# Patient Record
Sex: MALE | Race: WHITE | Employment: OTHER | URBAN - NONMETROPOLITAN AREA
[De-identification: names, ages, dates, MRNs, and addresses within clinical notes are randomized per-mention and may not be internally consistent; named-entity substitution may affect disease eponyms.]

---

## 2018-03-06 ENCOUNTER — HOSPITAL ENCOUNTER (OUTPATIENT)
Age: 68
Discharge: HOME | End: 2018-03-06
Payer: MEDICARE

## 2018-03-06 LAB
ALBUMIN SERPL-MCNC: 4.3 G/DL (ref 3.5–5.1)
ALP BLD-CCNC: 83 U/L (ref 38–126)
ALT SERPL-CCNC: 20 U/L (ref 11–66)
ANION GAP SERPL CALCULATED.3IONS-SCNC: 12 MEQ/L (ref 8–16)
AST SERPL-CCNC: 22 U/L (ref 5–40)
BASOPHILS # BLD: 0.4 %
BASOPHILS ABSOLUTE: 0 THOU/MM3 (ref 0–0.1)
BILIRUB SERPL-MCNC: 0.8 MG/DL (ref 0.3–1.2)
BILIRUBIN DIRECT: < 0.2 MG/DL (ref 0–0.3)
BUN BLDV-MCNC: 17 MG/DL (ref 7–22)
CALCIUM SERPL-MCNC: 9.4 MG/DL (ref 8.5–10.5)
CHLORIDE BLD-SCNC: 104 MEQ/L (ref 98–111)
CHOLESTEROL, FASTING: 163 MG/DL (ref 100–199)
CO2: 28 MEQ/L (ref 23–33)
CREAT SERPL-MCNC: 1 MG/DL (ref 0.4–1.2)
EOSINOPHIL # BLD: 3.2 %
EOSINOPHILS ABSOLUTE: 0.2 THOU/MM3 (ref 0–0.4)
GFR SERPL CREATININE-BSD FRML MDRD: 74 ML/MIN/1.73M2
GLUCOSE FASTING: 107 MG/DL (ref 70–108)
HCT VFR BLD CALC: 43.2 % (ref 42–52)
HDLC SERPL-MCNC: 52 MG/DL
HEMOGLOBIN: 14.2 GM/DL (ref 14–18)
LDL CHOLESTEROL CALCULATED: 97 MG/DL
LYMPHOCYTES # BLD: 18.5 %
LYMPHOCYTES ABSOLUTE: 1.3 THOU/MM3 (ref 1–4.8)
MCH RBC QN AUTO: 30.8 PG (ref 27–31)
MCHC RBC AUTO-ENTMCNC: 32.8 GM/DL (ref 33–37)
MCV RBC AUTO: 93.8 FL (ref 80–94)
MONOCYTES # BLD: 8.5 %
MONOCYTES ABSOLUTE: 0.6 THOU/MM3 (ref 0.4–1.3)
NUCLEATED RED BLOOD CELLS: 0 /100 WBC
PDW BLD-RTO: 13.6 % (ref 11.5–14.5)
PLATELET # BLD: 313 THOU/MM3 (ref 130–400)
PMV BLD AUTO: 7.9 FL (ref 7.4–10.4)
POTASSIUM SERPL-SCNC: 4.4 MEQ/L (ref 3.5–5.2)
PROSTATE SPECIFIC ANTIGEN: 0.41 NG/ML (ref 0–1)
RBC # BLD: 4.6 MILL/MM3 (ref 4.7–6.1)
SEG NEUTROPHILS: 69.4 %
SEGMENTED NEUTROPHILS ABSOLUTE COUNT: 4.7 THOU/MM3 (ref 1.8–7.7)
SODIUM BLD-SCNC: 144 MEQ/L (ref 135–145)
TOTAL PROTEIN: 6.9 G/DL (ref 6.1–8)
TRIGLYCERIDE, FASTING: 68 MG/DL (ref 0–199)
WBC # BLD: 6.8 THOU/MM3 (ref 4.8–10.8)

## 2018-03-06 PROCEDURE — 80076 HEPATIC FUNCTION PANEL: CPT

## 2018-03-06 PROCEDURE — G0103 PSA SCREENING: HCPCS

## 2018-03-06 PROCEDURE — 80048 BASIC METABOLIC PNL TOTAL CA: CPT

## 2018-03-06 PROCEDURE — 85025 COMPLETE CBC W/AUTO DIFF WBC: CPT

## 2018-03-06 PROCEDURE — 36415 COLL VENOUS BLD VENIPUNCTURE: CPT

## 2018-03-06 PROCEDURE — 80061 LIPID PANEL: CPT

## 2018-06-22 ENCOUNTER — HOSPITAL ENCOUNTER (OUTPATIENT)
Age: 68
Discharge: HOME | End: 2018-06-22
Payer: MEDICARE

## 2018-06-22 LAB
ALBUMIN SERPL-MCNC: 4.2 G/DL (ref 3.5–5.1)
ALP BLD-CCNC: 76 U/L (ref 38–126)
ALT SERPL-CCNC: 20 U/L (ref 11–66)
ANION GAP SERPL CALCULATED.3IONS-SCNC: 11 MEQ/L (ref 8–16)
AST SERPL-CCNC: 23 U/L (ref 5–40)
BILIRUB SERPL-MCNC: 0.8 MG/DL (ref 0.3–1.2)
BILIRUBIN DIRECT: < 0.2 MG/DL (ref 0–0.3)
BUN BLDV-MCNC: 18 MG/DL (ref 7–22)
CALCIUM SERPL-MCNC: 9.8 MG/DL (ref 8.5–10.5)
CHLORIDE BLD-SCNC: 105 MEQ/L (ref 98–111)
CO2: 28 MEQ/L (ref 23–33)
CREAT SERPL-MCNC: 1 MG/DL (ref 0.4–1.2)
GFR SERPL CREATININE-BSD FRML MDRD: 74 ML/MIN/1.73M2
GLUCOSE BLD-MCNC: 103 MG/DL (ref 70–108)
HCT VFR BLD CALC: 45.3 % (ref 42–52)
HEMOGLOBIN: 15.2 GM/DL (ref 14–18)
MCH RBC QN AUTO: 31.5 PG (ref 27–31)
MCHC RBC AUTO-ENTMCNC: 33.6 GM/DL (ref 33–37)
MCV RBC AUTO: 93.7 FL (ref 80–94)
PDW BLD-RTO: 13.8 % (ref 11.5–14.5)
PLATELET # BLD: 294 THOU/MM3 (ref 130–400)
PMV BLD AUTO: 7.9 FL (ref 7.4–10.4)
POTASSIUM SERPL-SCNC: 5 MEQ/L (ref 3.5–5.2)
RBC # BLD: 4.83 MILL/MM3 (ref 4.7–6.1)
SODIUM BLD-SCNC: 144 MEQ/L (ref 135–145)
TOTAL PROTEIN: 7.1 G/DL (ref 6.1–8)
WBC # BLD: 5.8 THOU/MM3 (ref 4.8–10.8)

## 2018-06-22 PROCEDURE — 80053 COMPREHEN METABOLIC PANEL: CPT

## 2018-06-22 PROCEDURE — 85027 COMPLETE CBC AUTOMATED: CPT

## 2018-06-22 PROCEDURE — 36415 COLL VENOUS BLD VENIPUNCTURE: CPT

## 2018-06-22 PROCEDURE — 82248 BILIRUBIN DIRECT: CPT

## 2018-08-10 ENCOUNTER — HOSPITAL ENCOUNTER (EMERGENCY)
Age: 68
Discharge: HOME | End: 2018-08-10
Attending: FAMILY MEDICINE
Payer: MEDICARE

## 2018-08-10 ENCOUNTER — APPOINTMENT (OUTPATIENT)
Dept: GENERAL RADIOLOGY | Age: 68
End: 2018-08-10
Payer: MEDICARE

## 2018-08-10 VITALS
HEART RATE: 62 BPM | BODY MASS INDEX: 25.77 KG/M2 | WEIGHT: 180 LBS | OXYGEN SATURATION: 97 % | RESPIRATION RATE: 16 BRPM | HEIGHT: 70 IN | SYSTOLIC BLOOD PRESSURE: 148 MMHG | TEMPERATURE: 97.1 F | DIASTOLIC BLOOD PRESSURE: 87 MMHG

## 2018-08-10 DIAGNOSIS — S39.012A BACK STRAIN, INITIAL ENCOUNTER: Primary | ICD-10-CM

## 2018-08-10 PROCEDURE — 6360000002 HC RX W HCPCS: Performed by: FAMILY MEDICINE

## 2018-08-10 PROCEDURE — 96372 THER/PROPH/DIAG INJ SC/IM: CPT

## 2018-08-10 PROCEDURE — 2709999900 HC NON-CHARGEABLE SUPPLY

## 2018-08-10 PROCEDURE — 72100 X-RAY EXAM L-S SPINE 2/3 VWS: CPT

## 2018-08-10 PROCEDURE — 99283 EMERGENCY DEPT VISIT LOW MDM: CPT

## 2018-08-10 RX ORDER — ACETAMINOPHEN 500 MG
1000 TABLET ORAL EVERY 6 HOURS PRN
COMMUNITY

## 2018-08-10 RX ORDER — CYCLOBENZAPRINE HCL 10 MG
10 TABLET ORAL 3 TIMES DAILY PRN
Qty: 21 TABLET | Refills: 0 | Status: SHIPPED | OUTPATIENT
Start: 2018-08-10 | End: 2018-08-20

## 2018-08-10 RX ORDER — TRAMADOL HYDROCHLORIDE 50 MG/1
50 TABLET ORAL EVERY 4 HOURS PRN
Qty: 18 TABLET | Refills: 0 | Status: SHIPPED | OUTPATIENT
Start: 2018-08-10 | End: 2018-08-13

## 2018-08-10 RX ORDER — CETIRIZINE HYDROCHLORIDE 10 MG/1
10 TABLET ORAL DAILY
COMMUNITY

## 2018-08-10 RX ORDER — ORPHENADRINE CITRATE 30 MG/ML
60 INJECTION INTRAMUSCULAR; INTRAVENOUS ONCE
Status: COMPLETED | OUTPATIENT
Start: 2018-08-10 | End: 2018-08-10

## 2018-08-10 RX ORDER — KETOROLAC TROMETHAMINE 30 MG/ML
30 INJECTION, SOLUTION INTRAMUSCULAR; INTRAVENOUS ONCE
Status: COMPLETED | OUTPATIENT
Start: 2018-08-10 | End: 2018-08-10

## 2018-08-10 RX ORDER — PREDNISONE 10 MG/1
10 TABLET ORAL DAILY
Status: ON HOLD | COMMUNITY
End: 2020-09-26 | Stop reason: HOSPADM

## 2018-08-10 RX ADMIN — KETOROLAC TROMETHAMINE 30 MG: 30 INJECTION, SOLUTION INTRAMUSCULAR at 17:52

## 2018-08-10 RX ADMIN — ORPHENADRINE CITRATE 60 MG: 30 INJECTION INTRAMUSCULAR; INTRAVENOUS at 17:51

## 2018-08-10 ASSESSMENT — PAIN DESCRIPTION - DESCRIPTORS
DESCRIPTORS: DISCOMFORT;DULL
DESCRIPTORS: ACHING
DESCRIPTORS: SHARP

## 2018-08-10 ASSESSMENT — PAIN SCALES - GENERAL
PAINLEVEL_OUTOF10: 5
PAINLEVEL_OUTOF10: 2

## 2018-08-10 ASSESSMENT — PAIN DESCRIPTION - LOCATION
LOCATION: BACK
LOCATION: BACK

## 2018-08-10 ASSESSMENT — PAIN DESCRIPTION - ORIENTATION: ORIENTATION: LOWER

## 2018-08-10 NOTE — ED NOTES
Pt pink, warm and dry, breathing with ease. Pt states he feels much better. Prescriptions explained, pt states understanding. AVS reviewed including follow up appointments, diagnosis, and care of self at home. Denies questions or concerns. Pt remains alert and oriented. Pt discharged in stable condition.       Guerda Mason RN  08/10/18 9960

## 2018-08-11 ASSESSMENT — ENCOUNTER SYMPTOMS
ABDOMINAL DISTENTION: 0
DIARRHEA: 0
SORE THROAT: 0
RHINORRHEA: 0
SHORTNESS OF BREATH: 0
SINUS PRESSURE: 0
EYE DISCHARGE: 0
STRIDOR: 0
NAUSEA: 0
ABDOMINAL PAIN: 0
EYE PAIN: 0
PHOTOPHOBIA: 0
WHEEZING: 0
BACK PAIN: 1
COUGH: 0
EYE REDNESS: 0
CONSTIPATION: 0
VOMITING: 0
CHEST TIGHTNESS: 0

## 2018-08-11 NOTE — ED PROVIDER NOTES
[facility]       Chief Complaint   Patient presents with    Back Pain     lower back       Nurses Notes reviewed and I agree except as noted in the HPI. HISTORY OF PRESENT ILLNESS    Cleora Sandhoff is a 70 y.o. male who presents With low back pain. Pain started earlier today. Patient states that he was bending over when he returned to a standing position he felt a pop in his lower back. He denies any referred pain to his lower legs. Denies any bowel or bladder changes. Denies any numbness around his anal area. Pain is some moderate intensity. REVIEW OF SYSTEMS     Review of Systems   Constitutional: Negative for chills, diaphoresis and fever (Non toxic in appearence). HENT: Negative for congestion, dental problem, ear pain, hearing loss, rhinorrhea, sinus pressure, sore throat and tinnitus. Eyes: Negative for photophobia, pain, discharge, redness and visual disturbance. Respiratory: Negative for cough, chest tightness, shortness of breath, wheezing and stridor. Cardiovascular: Negative for chest pain, palpitations and leg swelling. Gastrointestinal: Negative for abdominal distention, abdominal pain, constipation, diarrhea, nausea and vomiting. Genitourinary: Negative for difficulty urinating, discharge, dysuria, flank pain, frequency, hematuria, testicular pain and urgency. Musculoskeletal: Positive for back pain. Negative for arthralgias, gait problem, joint swelling, myalgias, neck pain and neck stiffness. Skin: Negative for pallor, rash and wound. Neurological: Negative for dizziness, tremors, seizures, syncope, numbness and headaches. Hematological: Negative for adenopathy. Does not bruise/bleed easily. Psychiatric/Behavioral: Negative for agitation, confusion, hallucinations, self-injury and suicidal ideas. The patient is not nervous/anxious. All other systems reviewed and are negative.        PAST MEDICAL HISTORY    has a

## 2019-03-30 ENCOUNTER — HOSPITAL ENCOUNTER (OUTPATIENT)
Age: 69
Discharge: HOME | End: 2019-03-30
Payer: MEDICARE

## 2019-03-30 LAB
ALBUMIN SERPL-MCNC: 4 G/DL (ref 3.5–5.1)
ALP BLD-CCNC: 70 U/L (ref 38–126)
ALT SERPL-CCNC: 21 U/L (ref 11–66)
ANION GAP SERPL CALCULATED.3IONS-SCNC: 14 MEQ/L (ref 8–16)
AST SERPL-CCNC: 26 U/L (ref 5–40)
BASOPHILS # BLD: 1.1 %
BASOPHILS ABSOLUTE: 0.1 THOU/MM3 (ref 0–0.1)
BILIRUB SERPL-MCNC: 0.3 MG/DL (ref 0.3–1.2)
BILIRUBIN DIRECT: < 0.2 MG/DL (ref 0–0.3)
BUN BLDV-MCNC: 16 MG/DL (ref 7–22)
CALCIUM SERPL-MCNC: 8.7 MG/DL (ref 8.5–10.5)
CHLORIDE BLD-SCNC: 103 MEQ/L (ref 98–111)
CHOLESTEROL, TOTAL: 149 MG/DL (ref 100–199)
CO2: 25 MEQ/L (ref 23–33)
CREAT SERPL-MCNC: 0.9 MG/DL (ref 0.4–1.2)
EOSINOPHIL # BLD: 4.3 %
EOSINOPHILS ABSOLUTE: 0.2 THOU/MM3 (ref 0–0.4)
ERYTHROCYTE [DISTWIDTH] IN BLOOD BY AUTOMATED COUNT: 12.9 % (ref 11.5–14.5)
ERYTHROCYTE [DISTWIDTH] IN BLOOD BY AUTOMATED COUNT: 45.7 FL (ref 35–45)
GFR SERPL CREATININE-BSD FRML MDRD: 83 ML/MIN/1.73M2
GLUCOSE BLD-MCNC: 97 MG/DL (ref 70–108)
HCT VFR BLD CALC: 42.7 % (ref 42–52)
HDLC SERPL-MCNC: 51 MG/DL
HEMOGLOBIN: 13.7 GM/DL (ref 14–18)
IMMATURE GRANS (ABS): 0.02 THOU/MM3 (ref 0–0.07)
IMMATURE GRANULOCYTES: 0.4 %
LDL CHOLESTEROL CALCULATED: 89 MG/DL
LYMPHOCYTES # BLD: 23.1 %
LYMPHOCYTES ABSOLUTE: 1.2 THOU/MM3 (ref 1–4.8)
MCH RBC QN AUTO: 31 PG (ref 26–33)
MCHC RBC AUTO-ENTMCNC: 32.1 GM/DL (ref 32.2–35.5)
MCV RBC AUTO: 96.6 FL (ref 80–94)
MONOCYTES # BLD: 10.4 %
MONOCYTES ABSOLUTE: 0.6 THOU/MM3 (ref 0.4–1.3)
NUCLEATED RED BLOOD CELLS: 0 /100 WBC
PLATELET # BLD: 287 THOU/MM3 (ref 130–400)
PMV BLD AUTO: 9.7 FL (ref 9.4–12.4)
POTASSIUM SERPL-SCNC: 3.9 MEQ/L (ref 3.5–5.2)
RBC # BLD: 4.42 MILL/MM3 (ref 4.7–6.1)
SEG NEUTROPHILS: 60.7 %
SEGMENTED NEUTROPHILS ABSOLUTE COUNT: 3.3 THOU/MM3 (ref 1.8–7.7)
SODIUM BLD-SCNC: 142 MEQ/L (ref 135–145)
TOTAL PROTEIN: 6.6 G/DL (ref 6.1–8)
TRIGL SERPL-MCNC: 44 MG/DL (ref 0–199)
WBC # BLD: 5.4 THOU/MM3 (ref 4.8–10.8)

## 2019-03-30 PROCEDURE — 80061 LIPID PANEL: CPT

## 2019-03-30 PROCEDURE — 36415 COLL VENOUS BLD VENIPUNCTURE: CPT

## 2019-03-30 PROCEDURE — 80053 COMPREHEN METABOLIC PANEL: CPT

## 2019-03-30 PROCEDURE — 85025 COMPLETE CBC W/AUTO DIFF WBC: CPT

## 2019-03-30 PROCEDURE — 82248 BILIRUBIN DIRECT: CPT

## 2019-04-06 ENCOUNTER — HOSPITAL ENCOUNTER (EMERGENCY)
Age: 69
Discharge: HOME | End: 2019-04-06
Attending: EMERGENCY MEDICINE
Payer: MEDICARE

## 2019-04-06 ENCOUNTER — APPOINTMENT (OUTPATIENT)
Dept: GENERAL RADIOLOGY | Age: 69
End: 2019-04-06
Payer: MEDICARE

## 2019-04-06 VITALS
DIASTOLIC BLOOD PRESSURE: 90 MMHG | HEIGHT: 71 IN | WEIGHT: 180 LBS | BODY MASS INDEX: 25.2 KG/M2 | HEART RATE: 83 BPM | OXYGEN SATURATION: 97 % | SYSTOLIC BLOOD PRESSURE: 136 MMHG | RESPIRATION RATE: 15 BRPM | TEMPERATURE: 97.7 F

## 2019-04-06 DIAGNOSIS — R09.82 POSTNASAL DRIP: ICD-10-CM

## 2019-04-06 DIAGNOSIS — J06.9 VIRAL URI: ICD-10-CM

## 2019-04-06 DIAGNOSIS — R05.9 COUGH: Primary | ICD-10-CM

## 2019-04-06 PROCEDURE — 71046 X-RAY EXAM CHEST 2 VIEWS: CPT

## 2019-04-06 PROCEDURE — 99284 EMERGENCY DEPT VISIT MOD MDM: CPT

## 2019-04-06 PROCEDURE — 6370000000 HC RX 637 (ALT 250 FOR IP): Performed by: EMERGENCY MEDICINE

## 2019-04-06 RX ORDER — FLUTICASONE PROPIONATE 50 MCG
1 SPRAY, SUSPENSION (ML) NASAL DAILY
Qty: 1 BOTTLE | Refills: 0 | Status: SHIPPED | OUTPATIENT
Start: 2019-04-06 | End: 2021-09-13

## 2019-04-06 RX ORDER — ONDANSETRON 4 MG/1
4 TABLET, ORALLY DISINTEGRATING ORAL ONCE
Status: COMPLETED | OUTPATIENT
Start: 2019-04-06 | End: 2019-04-06

## 2019-04-06 RX ORDER — CEPHALEXIN 500 MG/1
500 CAPSULE ORAL 3 TIMES DAILY
COMMUNITY
End: 2020-09-19

## 2019-04-06 RX ADMIN — ONDANSETRON 4 MG: 4 TABLET, ORALLY DISINTEGRATING ORAL at 07:56

## 2019-04-06 ASSESSMENT — ENCOUNTER SYMPTOMS
BLOOD IN STOOL: 0
EYE DISCHARGE: 0
SORE THROAT: 0
RHINORRHEA: 1
WHEEZING: 0
COUGH: 1
ABDOMINAL PAIN: 0
EYE PAIN: 0
DIARRHEA: 0
SHORTNESS OF BREATH: 0

## 2019-04-06 ASSESSMENT — PAIN DESCRIPTION - LOCATION
LOCATION: HEAD
LOCATION: HEAD

## 2019-04-06 ASSESSMENT — PAIN - FUNCTIONAL ASSESSMENT: PAIN_FUNCTIONAL_ASSESSMENT: 0-10

## 2019-04-06 ASSESSMENT — PAIN SCALES - GENERAL
PAINLEVEL_OUTOF10: 4
PAINLEVEL_OUTOF10: 4

## 2019-04-06 NOTE — ED NOTES
Pt alert and oriented. Respirations regular and easy. Prescriptions given and instructed on. Discharge instructions reviewed. States understanding. Pt discharged in satisfactory condition.        Alvaro Decker RN  04/06/19 2709

## 2019-04-06 NOTE — ED NOTES
Pt presents w/ c/o headache, cough, fatigue, weakness, and nausea w/ 1 episode of emesis. States that symptoms started on Tuesday, he was seen by Dr. Dolores Gr. States that he was given a shot in the office, which he believes was an antibiotic, and then was started on cephalexin. States that symptoms are getting worse and he is just wiped out. Denies any fever/ chills. States that cough has been productive, dry cough noted at present time during triage. Respirations regular and easy. Lungs sound clear t/o. C/o back and rib ain w/ cough.       Asmita Macias RN  04/06/19 0658

## 2019-04-06 NOTE — ED PROVIDER NOTES
[facility]  [address]  Phone: [phone]    eMERGENCY dEPARTMENT eNCOUnter           [address] [facility]       Chief Complaint   Patient presents with    Cough    Emesis    Fatigue    Headache       Nurses Notes reviewed and I agree except as noted in the HPI. HISTORY OF PRESENT ILLNESS    Doris Hernandez is a 70 y.o. male who presented via private vehicle with the chief complaint mentioned above. Symptoms started 4 days ago. He has mild intermittent nonproductive cough which is worse during the night. He vomited once last night after a a bout of coughing. He denies fever or chills. He is slightly tired, has mild diffuse headache. He was started on Keflex 4 days ago by his PCP. REVIEW OF SYSTEMS     Review of Systems   Constitutional: Positive for fatigue. Negative for chills and fever. HENT: Positive for congestion and rhinorrhea. Negative for sore throat. Eyes: Negative for pain and discharge. Respiratory: Positive for cough. Negative for shortness of breath and wheezing. Cardiovascular: Negative for chest pain and palpitations. Gastrointestinal: Negative for abdominal pain, blood in stool and diarrhea. Genitourinary: Negative for dysuria and hematuria. Musculoskeletal: Negative for neck pain and neck stiffness. Neurological: Negative for seizures, syncope and headaches. Psychiatric/Behavioral: Negative for confusion. PAST MEDICAL HISTORY    has a past medical history of GERD (gastroesophageal reflux disease) and Hyperlipidemia. SURGICAL HISTORY      has a past surgical history that includes Neck surgery and Knee arthroscopy.     CURRENT MEDICATIONS       Previous Medications    ACETAMINOPHEN (TYLENOL) 500 MG TABLET    Take 1,000 mg by mouth every 6 hours as needed for Pain    CEPHALEXIN (KEFLEX) 500 MG CAPSULE    Take 500 mg by mouth 3 times daily    CETIRIZINE (ZYRTEC) 10 MG TABLET    Take 10 mg by mouth daily MYCOPHENOLATE MOFETIL PO    Take 500 mg by mouth daily    OMEPRAZOLE (PRILOSEC) 20 MG CAPSULE    Take 20 mg by mouth daily    PREDNISONE (DELTASONE) 10 MG TABLET    Take 10 mg by mouth daily    SIMVASTATIN (ZOCOR) 20 MG TABLET    Take 20 mg by mouth nightly       ALLERGIES     is allergic to sulfa antibiotics. FAMILY HISTORY     has no family status information on file. family history is not on file. SOCIAL HISTORY      reports that he has never smoked. He has never used smokeless tobacco. He reports that he drinks about 0.6 oz of alcohol per week. He reports that he does not use drugs. PHYSICAL EXAM     INITIAL VITALS:  height is 5' 11\" (1.803 m) and weight is 180 lb (81.6 kg). His temporal temperature is 97.7 °F (36.5 °C). His blood pressure is 136/90 (abnormal) and his pulse is 83. His respiration is 15 and oxygen saturation is 97%. Physical Exam   Constitutional: He appears well-developed and well-nourished. He is active. No distress. HENT:   Nose: Rhinorrhea present. Mouth/Throat: Oropharynx is clear and moist. No oropharyngeal exudate. Eyes: Conjunctivae are normal. No scleral icterus. Neck: Normal range of motion. Neck supple. No JVD present. No thyromegaly present. Cardiovascular: Normal rate, regular rhythm and normal heart sounds. No murmur heard. Pulmonary/Chest: Effort normal and breath sounds normal. No respiratory distress. Normal work  of  breathing   Abdominal: Soft. Bowel sounds are normal. He exhibits no distension and no mass. There is no tenderness. There is no rebound and no guarding. Musculoskeletal: He exhibits no edema or tenderness. Neurological: He is alert. Skin: Skin is warm and dry. No cyanosis. Nails show no clubbing. DIAGNOSTIC RESULTS       RADIOLOGY:  Chest x-ray, PA lateral, was unremarkable.       EMERGENCY DEPARTMENT COURSE:   Vitals:    Vitals:    04/06/19 0726   BP: (!) 136/90   Pulse: 83   Resp: 15   Temp: 97.7 °F (36.5 °C) TempSrc: Temporal   SpO2: 97%   Weight: 180 lb (81.6 kg)   Height: 5' 11\" (1.803 m)         FINAL IMPRESSION      1. Cough    2. Viral URI    3. Postnasal drip          DISPOSITION/PLAN   Discharged home in good condition, he was given discharge instructions and was advised to return if worse or new symptoms. PATIENT REFERRED TO:  No follow-up provider specified.     DISCHARGE MEDICATIONS:  New Prescriptions    FLUTICASONE (FLONASE) 50 MCG/ACT NASAL SPRAY    1 spray by Each Nare route daily       (Please note that portions of this note were completed with a voice recognition program.  Efforts were made to edit the dictations but occasionally words are mis-transcribed.)    MD Gregory Stiles MD  04/06/19 0142

## 2019-10-29 ENCOUNTER — HOSPITAL ENCOUNTER (OUTPATIENT)
Dept: MRI IMAGING | Age: 69
Discharge: HOME | End: 2019-10-29
Payer: MEDICARE

## 2019-10-29 DIAGNOSIS — M25.512 LEFT SHOULDER PAIN, UNSPECIFIED CHRONICITY: ICD-10-CM

## 2019-10-29 PROCEDURE — 73221 MRI JOINT UPR EXTREM W/O DYE: CPT

## 2020-01-29 ENCOUNTER — HOSPITAL ENCOUNTER (OUTPATIENT)
Dept: GENERAL RADIOLOGY | Age: 70
Discharge: HOME | End: 2020-01-29
Payer: MEDICARE

## 2020-01-29 ENCOUNTER — HOSPITAL ENCOUNTER (OUTPATIENT)
Age: 70
Discharge: HOME | End: 2020-01-29
Payer: MEDICARE

## 2020-01-29 PROCEDURE — 71046 X-RAY EXAM CHEST 2 VIEWS: CPT

## 2020-04-01 ENCOUNTER — HOSPITAL ENCOUNTER (OUTPATIENT)
Age: 70
Discharge: HOME | End: 2020-04-01
Payer: MEDICARE

## 2020-04-01 LAB
ALBUMIN SERPL-MCNC: 4.3 G/DL (ref 3.5–5.1)
ALP BLD-CCNC: 68 U/L (ref 38–126)
ALT SERPL-CCNC: 24 U/L (ref 11–66)
ANION GAP SERPL CALCULATED.3IONS-SCNC: 14 MEQ/L (ref 8–16)
AST SERPL-CCNC: 22 U/L (ref 5–40)
BASOPHILS # BLD: 0.9 %
BASOPHILS ABSOLUTE: 0.1 THOU/MM3 (ref 0–0.1)
BILIRUB SERPL-MCNC: 0.6 MG/DL (ref 0.3–1.2)
BILIRUBIN DIRECT: < 0.2 MG/DL (ref 0–0.3)
BUN BLDV-MCNC: 16 MG/DL (ref 7–22)
CALCIUM SERPL-MCNC: 9.3 MG/DL (ref 8.5–10.5)
CHLORIDE BLD-SCNC: 105 MEQ/L (ref 98–111)
CHOLESTEROL, TOTAL: 172 MG/DL (ref 100–199)
CO2: 25 MEQ/L (ref 23–33)
CREAT SERPL-MCNC: 0.9 MG/DL (ref 0.4–1.2)
EOSINOPHIL # BLD: 6.1 %
EOSINOPHILS ABSOLUTE: 0.4 THOU/MM3 (ref 0–0.4)
ERYTHROCYTE [DISTWIDTH] IN BLOOD BY AUTOMATED COUNT: 12.9 % (ref 11.5–14.5)
ERYTHROCYTE [DISTWIDTH] IN BLOOD BY AUTOMATED COUNT: 47.7 FL (ref 35–45)
GFR SERPL CREATININE-BSD FRML MDRD: 83 ML/MIN/1.73M2
GLUCOSE BLD-MCNC: 100 MG/DL (ref 70–108)
HCT VFR BLD CALC: 46.1 % (ref 42–52)
HDLC SERPL-MCNC: 47 MG/DL
HEMOGLOBIN: 14.6 GM/DL (ref 14–18)
IMMATURE GRANS (ABS): 0.03 THOU/MM3 (ref 0–0.07)
IMMATURE GRANULOCYTES: 0.5 %
LDL CHOLESTEROL CALCULATED: 108 MG/DL
LYMPHOCYTES # BLD: 19.8 %
LYMPHOCYTES ABSOLUTE: 1.3 THOU/MM3 (ref 1–4.8)
MCH RBC QN AUTO: 31.9 PG (ref 26–33)
MCHC RBC AUTO-ENTMCNC: 31.7 GM/DL (ref 32.2–35.5)
MCV RBC AUTO: 100.7 FL (ref 80–94)
MONOCYTES # BLD: 8.9 %
MONOCYTES ABSOLUTE: 0.6 THOU/MM3 (ref 0.4–1.3)
NUCLEATED RED BLOOD CELLS: 0 /100 WBC
PLATELET # BLD: 282 THOU/MM3 (ref 130–400)
PMV BLD AUTO: 9.7 FL (ref 9.4–12.4)
POTASSIUM SERPL-SCNC: 4.1 MEQ/L (ref 3.5–5.2)
PROSTATE SPECIFIC ANTIGEN: 0.46 NG/ML (ref 0–1)
RBC # BLD: 4.58 MILL/MM3 (ref 4.7–6.1)
SEG NEUTROPHILS: 63.8 %
SEGMENTED NEUTROPHILS ABSOLUTE COUNT: 4.1 THOU/MM3 (ref 1.8–7.7)
SODIUM BLD-SCNC: 144 MEQ/L (ref 135–145)
TOTAL PROTEIN: 6.9 G/DL (ref 6.1–8)
TRIGL SERPL-MCNC: 85 MG/DL (ref 0–199)
WBC # BLD: 6.4 THOU/MM3 (ref 4.8–10.8)

## 2020-04-01 PROCEDURE — 82248 BILIRUBIN DIRECT: CPT

## 2020-04-01 PROCEDURE — G0103 PSA SCREENING: HCPCS

## 2020-04-01 PROCEDURE — 36415 COLL VENOUS BLD VENIPUNCTURE: CPT

## 2020-04-01 PROCEDURE — 80061 LIPID PANEL: CPT

## 2020-04-01 PROCEDURE — 80053 COMPREHEN METABOLIC PANEL: CPT

## 2020-04-01 PROCEDURE — 85025 COMPLETE CBC W/AUTO DIFF WBC: CPT

## 2020-09-19 ENCOUNTER — HOSPITAL ENCOUNTER (INPATIENT)
Age: 70
LOS: 7 days | Discharge: HOME | DRG: 177 | End: 2020-09-26
Attending: EMERGENCY MEDICINE | Admitting: NURSE PRACTITIONER
Payer: MEDICARE

## 2020-09-19 ENCOUNTER — APPOINTMENT (OUTPATIENT)
Dept: GENERAL RADIOLOGY | Age: 70
DRG: 177 | End: 2020-09-19
Payer: MEDICARE

## 2020-09-19 PROBLEM — U07.1 PNEUMONIA DUE TO COVID-19 VIRUS: Status: ACTIVE | Noted: 2020-09-19

## 2020-09-19 PROBLEM — J12.82 PNEUMONIA DUE TO COVID-19 VIRUS: Status: ACTIVE | Noted: 2020-09-19

## 2020-09-19 LAB
ABO: NORMAL
ALBUMIN SERPL-MCNC: 2.6 GM/DL (ref 3.4–5)
ALBUMIN SERPL-MCNC: 3.4 G/DL (ref 3.5–5.1)
ALP BLD-CCNC: 73 U/L (ref 46–116)
ALP BLD-CCNC: 80 U/L (ref 38–126)
ALT SERPL-CCNC: 48 U/L (ref 11–66)
ALT SERPL-CCNC: 59 U/L (ref 14–63)
ANION GAP SERPL CALCULATED.3IONS-SCNC: 11 MEQ/L (ref 8–16)
ANION GAP: 10 MEQ/L (ref 8–16)
ANTIBODY SCREEN: NORMAL
APTT: 28.7 SECONDS (ref 22–38)
AST SERPL-CCNC: 50 U/L (ref 5–40)
AST SERPL-CCNC: 58 U/L (ref 15–37)
BASOPHILS # BLD: 0 %
BASOPHILS # BLD: 0.1 %
BASOPHILS ABSOLUTE: 0 THOU/MM3 (ref 0–0.1)
BASOPHILS ABSOLUTE: 0 THOU/MM3 (ref 0–0.1)
BILIRUB SERPL-MCNC: 0.4 MG/DL (ref 0.2–1)
BILIRUB SERPL-MCNC: 0.4 MG/DL (ref 0.3–1.2)
BUN BLDV-MCNC: 13 MG/DL (ref 7–22)
BUN BLDV-MCNC: 17 MG/DL (ref 7–18)
CALCIUM SERPL-MCNC: 8.8 MG/DL (ref 8.5–10.5)
CHLORIDE BLD-SCNC: 92 MEQ/L (ref 98–107)
CHLORIDE BLD-SCNC: 97 MEQ/L (ref 98–111)
CO2: 26 MEQ/L (ref 23–33)
CO2: 27 MEQ/L (ref 21–32)
CREAT SERPL-MCNC: 0.7 MG/DL (ref 0.4–1.2)
CREAT SERPL-MCNC: 1 MG/DL (ref 0.6–1.3)
D-DIMER QUANTITATIVE: 333 NG/ML FEU (ref 0–500)
EKG ATRIAL RATE: 80 BPM
EKG P AXIS: 61 DEGREES
EKG P-R INTERVAL: 162 MS
EKG Q-T INTERVAL: 382 MS
EKG QRS DURATION: 96 MS
EKG QTC CALCULATION (BAZETT): 440 MS
EKG R AXIS: 17 DEGREES
EKG T AXIS: 16 DEGREES
EKG VENTRICULAR RATE: 80 BPM
EOSINOPHIL # BLD: 0 %
EOSINOPHIL # BLD: 0 %
EOSINOPHILS ABSOLUTE: 0 THOU/MM3 (ref 0–0.4)
EOSINOPHILS ABSOLUTE: 0 THOU/MM3 (ref 0–0.4)
ERYTHROCYTE [DISTWIDTH] IN BLOOD BY AUTOMATED COUNT: 12.3 % (ref 11.5–14.5)
ERYTHROCYTE [DISTWIDTH] IN BLOOD BY AUTOMATED COUNT: 41.7 FL (ref 35–45)
FERRITIN: 1910 NG/ML (ref 22–322)
FIBRINOGEN: 604 MG/100ML (ref 155–475)
GFR SERPL CREATININE-BSD FRML MDRD: > 90 ML/MIN/1.73M2
GFR, ESTIMATED: 78 ML/MIN/1.73M2
GLUCOSE BLD-MCNC: 138 MG/DL (ref 74–106)
GLUCOSE BLD-MCNC: 144 MG/DL (ref 70–108)
HCT VFR BLD CALC: 42.5 % (ref 42–52)
HCT VFR BLD CALC: 43.2 % (ref 42–52)
HEMOGLOBIN: 14.2 GM/DL (ref 14–18)
HEMOGLOBIN: 14.7 GM/DL (ref 14–18)
IMMATURE GRANS (ABS): 0.03 THOU/MM3 (ref 0–0.07)
IMMATURE GRANS (ABS): 0.04 THOU/MM3 (ref 0–0.07)
IMMATURE GRANULOCYTES: 0.4 %
IMMATURE GRANULOCYTES: 0.5 %
INR BLD: 1.07 (ref 0.85–1.13)
LACTATE: 1.2 MMOL/L (ref 0.9–1.7)
LACTIC ACID: 1.5 MMOL/L (ref 0.5–2.2)
LD: 318 U/L (ref 100–190)
LYMPHOCYTES # BLD: 3 %
LYMPHOCYTES # BLD: 3.2 %
LYMPHOCYTES ABSOLUTE: 0.2 THOU/MM3 (ref 1–4.8)
LYMPHOCYTES ABSOLUTE: 0.3 THOU/MM3 (ref 1–4.8)
MCH RBC QN AUTO: 31.5 PG (ref 26–33)
MCH RBC QN AUTO: 32 PG (ref 27–31)
MCHC RBC AUTO-ENTMCNC: 33.5 GM/DL (ref 33–37)
MCHC RBC AUTO-ENTMCNC: 34 GM/DL (ref 32.2–35.5)
MCV RBC AUTO: 92.5 FL (ref 80–94)
MCV RBC AUTO: 95.5 FL (ref 80–94)
MONOCYTES # BLD: 2.2 %
MONOCYTES # BLD: 4.4 %
MONOCYTES ABSOLUTE: 0.2 THOU/MM3 (ref 0.4–1.3)
MONOCYTES ABSOLUTE: 0.4 THOU/MM3 (ref 0.4–1.3)
NUCLEATED RED BLOOD CELLS: 0 /100 WBC
NUCLEATED RED BLOOD CELLS: 0 /100 WBC
PDW BLD-RTO: 12.7 % (ref 11.5–14.5)
PLATELET # BLD: 189 THOU/MM3 (ref 130–400)
PLATELET # BLD: 220 THOU/MM3 (ref 130–400)
PMV BLD AUTO: 7.3 FL (ref 7.4–10.4)
PMV BLD AUTO: 9.2 FL (ref 9.4–12.4)
POC CALCIUM: 8.5 MG/DL (ref 8.5–10.1)
POTASSIUM REFLEX MAGNESIUM: 4.1 MEQ/L (ref 3.5–5.2)
POTASSIUM SERPL-SCNC: 3.7 MEQ/L (ref 3.5–5.1)
PROCALCITONIN: 0.16 NG/ML (ref 0.01–0.09)
RBC # BLD: 4.45 MILL/MM3 (ref 4.7–6.1)
RBC # BLD: 4.67 MILL/MM3 (ref 4.7–6.1)
RH FACTOR: NORMAL
SEDIMENTATION RATE, ERYTHROCYTE: 34 MM/HR (ref 0–10)
SEG NEUTROPHILS: 91.8 %
SEG NEUTROPHILS: 94.4 %
SEGMENTED NEUTROPHILS ABSOLUTE COUNT: 6.5 THOU/MM3 (ref 1.8–7.7)
SEGMENTED NEUTROPHILS ABSOLUTE COUNT: 7.5 THOU/MM3 (ref 1.8–7.7)
SODIUM BLD-SCNC: 129 MEQ/L (ref 136–145)
SODIUM BLD-SCNC: 134 MEQ/L (ref 135–145)
TOTAL PROTEIN: 6.6 GM/DL (ref 6.4–8.2)
TOTAL PROTEIN: 6.9 G/DL (ref 6.1–8)
TROPONIN I: < 0.017 NG/ML (ref 0.02–0.06)
TROPONIN T: < 0.01 NG/ML
WBC # BLD: 6.9 THOU/MM3 (ref 4.8–10.8)
WBC # BLD: 8 THOU/MM3 (ref 4.8–10.8)

## 2020-09-19 PROCEDURE — 83605 ASSAY OF LACTIC ACID: CPT

## 2020-09-19 PROCEDURE — 96365 THER/PROPH/DIAG IV INF INIT: CPT

## 2020-09-19 PROCEDURE — 93005 ELECTROCARDIOGRAM TRACING: CPT | Performed by: EMERGENCY MEDICINE

## 2020-09-19 PROCEDURE — 82728 ASSAY OF FERRITIN: CPT

## 2020-09-19 PROCEDURE — P9059 PLASMA, FRZ BETWEEN 8-24HOUR: HCPCS

## 2020-09-19 PROCEDURE — 2580000003 HC RX 258: Performed by: EMERGENCY MEDICINE

## 2020-09-19 PROCEDURE — 99284 EMERGENCY DEPT VISIT MOD MDM: CPT

## 2020-09-19 PROCEDURE — 86901 BLOOD TYPING SEROLOGIC RH(D): CPT

## 2020-09-19 PROCEDURE — 85610 PROTHROMBIN TIME: CPT

## 2020-09-19 PROCEDURE — 84145 PROCALCITONIN (PCT): CPT

## 2020-09-19 PROCEDURE — 93010 ELECTROCARDIOGRAM REPORT: CPT | Performed by: NUCLEAR MEDICINE

## 2020-09-19 PROCEDURE — 84484 ASSAY OF TROPONIN QUANT: CPT

## 2020-09-19 PROCEDURE — 71045 X-RAY EXAM CHEST 1 VIEW: CPT

## 2020-09-19 PROCEDURE — 83615 LACTATE (LD) (LDH) ENZYME: CPT

## 2020-09-19 PROCEDURE — 36415 COLL VENOUS BLD VENIPUNCTURE: CPT

## 2020-09-19 PROCEDURE — 85379 FIBRIN DEGRADATION QUANT: CPT

## 2020-09-19 PROCEDURE — 6370000000 HC RX 637 (ALT 250 FOR IP): Performed by: INTERNAL MEDICINE

## 2020-09-19 PROCEDURE — 2709999900 HC NON-CHARGEABLE SUPPLY

## 2020-09-19 PROCEDURE — 99223 1ST HOSP IP/OBS HIGH 75: CPT | Performed by: INTERNAL MEDICINE

## 2020-09-19 PROCEDURE — 86850 RBC ANTIBODY SCREEN: CPT

## 2020-09-19 PROCEDURE — 87040 BLOOD CULTURE FOR BACTERIA: CPT

## 2020-09-19 PROCEDURE — 86900 BLOOD TYPING SEROLOGIC ABO: CPT

## 2020-09-19 PROCEDURE — 85730 THROMBOPLASTIN TIME PARTIAL: CPT

## 2020-09-19 PROCEDURE — 94760 N-INVAS EAR/PLS OXIMETRY 1: CPT

## 2020-09-19 PROCEDURE — 85025 COMPLETE CBC W/AUTO DIFF WBC: CPT

## 2020-09-19 PROCEDURE — 80053 COMPREHEN METABOLIC PANEL: CPT

## 2020-09-19 PROCEDURE — 96375 TX/PRO/DX INJ NEW DRUG ADDON: CPT

## 2020-09-19 PROCEDURE — 85385 FIBRINOGEN ANTIGEN: CPT

## 2020-09-19 PROCEDURE — 2580000003 HC RX 258: Performed by: INTERNAL MEDICINE

## 2020-09-19 PROCEDURE — 85651 RBC SED RATE NONAUTOMATED: CPT

## 2020-09-19 PROCEDURE — 1200000003 HC TELEMETRY R&B

## 2020-09-19 PROCEDURE — 6360000002 HC RX W HCPCS: Performed by: INTERNAL MEDICINE

## 2020-09-19 PROCEDURE — 6360000002 HC RX W HCPCS: Performed by: EMERGENCY MEDICINE

## 2020-09-19 RX ORDER — SODIUM CHLORIDE 0.9 % (FLUSH) 0.9 %
10 SYRINGE (ML) INJECTION PRN
Status: DISCONTINUED | OUTPATIENT
Start: 2020-09-19 | End: 2020-09-26 | Stop reason: HOSPADM

## 2020-09-19 RX ORDER — ATORVASTATIN CALCIUM 20 MG/1
20 TABLET, FILM COATED ORAL NIGHTLY
Status: DISCONTINUED | OUTPATIENT
Start: 2020-09-19 | End: 2020-09-26 | Stop reason: HOSPADM

## 2020-09-19 RX ORDER — SODIUM CHLORIDE 9 MG/ML
INJECTION, SOLUTION INTRAVENOUS CONTINUOUS
Status: DISCONTINUED | OUTPATIENT
Start: 2020-09-19 | End: 2020-09-22

## 2020-09-19 RX ORDER — DEXAMETHASONE 4 MG/1
6 TABLET ORAL DAILY
Status: DISCONTINUED | OUTPATIENT
Start: 2020-09-19 | End: 2020-09-26 | Stop reason: HOSPADM

## 2020-09-19 RX ORDER — POLYETHYLENE GLYCOL 3350 17 G/17G
17 POWDER, FOR SOLUTION ORAL DAILY PRN
Status: DISCONTINUED | OUTPATIENT
Start: 2020-09-19 | End: 2020-09-26 | Stop reason: HOSPADM

## 2020-09-19 RX ORDER — ACETAMINOPHEN 325 MG/1
650 TABLET ORAL EVERY 6 HOURS PRN
Status: DISCONTINUED | OUTPATIENT
Start: 2020-09-19 | End: 2020-09-26 | Stop reason: HOSPADM

## 2020-09-19 RX ORDER — PROMETHAZINE HYDROCHLORIDE 25 MG/1
12.5 TABLET ORAL EVERY 6 HOURS PRN
Status: DISCONTINUED | OUTPATIENT
Start: 2020-09-19 | End: 2020-09-26 | Stop reason: HOSPADM

## 2020-09-19 RX ORDER — MYCOPHENOLATE MOFETIL 250 MG/1
500 CAPSULE ORAL DAILY
Status: DISCONTINUED | OUTPATIENT
Start: 2020-09-19 | End: 2020-09-26 | Stop reason: HOSPADM

## 2020-09-19 RX ORDER — SODIUM CHLORIDE, SODIUM LACTATE, POTASSIUM CHLORIDE, CALCIUM CHLORIDE 600; 310; 30; 20 MG/100ML; MG/100ML; MG/100ML; MG/100ML
INJECTION, SOLUTION INTRAVENOUS CONTINUOUS
Status: DISCONTINUED | OUTPATIENT
Start: 2020-09-19 | End: 2020-09-21

## 2020-09-19 RX ORDER — DEXAMETHASONE SODIUM PHOSPHATE 4 MG/ML
6 INJECTION, SOLUTION INTRA-ARTICULAR; INTRALESIONAL; INTRAMUSCULAR; INTRAVENOUS; SOFT TISSUE ONCE
Status: COMPLETED | OUTPATIENT
Start: 2020-09-19 | End: 2020-09-19

## 2020-09-19 RX ORDER — 0.9 % SODIUM CHLORIDE 0.9 %
20 INTRAVENOUS SOLUTION INTRAVENOUS ONCE
Status: COMPLETED | OUTPATIENT
Start: 2020-09-19 | End: 2020-09-19

## 2020-09-19 RX ORDER — SODIUM CHLORIDE 0.9 % (FLUSH) 0.9 %
10 SYRINGE (ML) INJECTION EVERY 12 HOURS SCHEDULED
Status: DISCONTINUED | OUTPATIENT
Start: 2020-09-19 | End: 2020-09-26 | Stop reason: HOSPADM

## 2020-09-19 RX ORDER — ACETAMINOPHEN 650 MG/1
650 SUPPOSITORY RECTAL EVERY 6 HOURS PRN
Status: DISCONTINUED | OUTPATIENT
Start: 2020-09-19 | End: 2020-09-26 | Stop reason: HOSPADM

## 2020-09-19 RX ORDER — ONDANSETRON 2 MG/ML
4 INJECTION INTRAMUSCULAR; INTRAVENOUS EVERY 6 HOURS PRN
Status: DISCONTINUED | OUTPATIENT
Start: 2020-09-19 | End: 2020-09-26 | Stop reason: HOSPADM

## 2020-09-19 RX ORDER — CETIRIZINE HYDROCHLORIDE 10 MG/1
10 TABLET ORAL DAILY
Status: DISCONTINUED | OUTPATIENT
Start: 2020-09-19 | End: 2020-09-26 | Stop reason: HOSPADM

## 2020-09-19 RX ADMIN — SODIUM CHLORIDE, POTASSIUM CHLORIDE, SODIUM LACTATE AND CALCIUM CHLORIDE: 600; 310; 30; 20 INJECTION, SOLUTION INTRAVENOUS at 20:42

## 2020-09-19 RX ADMIN — ATORVASTATIN CALCIUM 20 MG: 20 TABLET, FILM COATED ORAL at 20:43

## 2020-09-19 RX ADMIN — CETIRIZINE HYDROCHLORIDE 10 MG: 10 TABLET ORAL at 18:26

## 2020-09-19 RX ADMIN — DEXAMETHASONE SODIUM PHOSPHATE 6 MG: 4 INJECTION, SOLUTION INTRAMUSCULAR; INTRAVENOUS at 11:06

## 2020-09-19 RX ADMIN — SODIUM CHLORIDE: 9 INJECTION, SOLUTION INTRAVENOUS at 09:50

## 2020-09-19 RX ADMIN — CEFTRIAXONE SODIUM 1 G: 1 INJECTION, POWDER, FOR SOLUTION INTRAMUSCULAR; INTRAVENOUS at 11:07

## 2020-09-19 RX ADMIN — SODIUM CHLORIDE 20 ML: 9 INJECTION, SOLUTION INTRAVENOUS at 16:20

## 2020-09-19 RX ADMIN — ENOXAPARIN SODIUM 30 MG: 30 INJECTION SUBCUTANEOUS at 18:26

## 2020-09-19 ASSESSMENT — ENCOUNTER SYMPTOMS
COUGH: 1
WHEEZING: 0
DIARRHEA: 1
SPUTUM PRODUCTION: 0
ABDOMINAL PAIN: 0
SHORTNESS OF BREATH: 1
SORE THROAT: 0
NAUSEA: 1

## 2020-09-19 NOTE — PLAN OF CARE
Problem: Discharge Planning:  Goal: Participates in care planning  Description: Participates in care planning  Outcome: Ongoing  Note: Plans to go home with wife. Problem: Activity Intolerance:  Goal: Ability to tolerate increased activity will improve  Description: Ability to tolerate increased activity will improve  Outcome: Ongoing  Note: Patient is able to walk around the room. Problem: Anxiety/Stress:  Goal: Level of anxiety will decrease  Description: Level of anxiety will decrease  Outcome: Ongoing  Note: No signs of anxiety at this time. Problem: Bowel Function - Altered:  Goal: Bowel elimination is within specified parameters  Description: Bowel elimination is within specified parameters  Outcome: Ongoing  Note: Reports that bowels moved yesterday. Problem: Fluid Volume - Deficit:  Goal: Electrolytes within specified parameters  Description: Electrolytes within specified parameters  Outcome: Ongoing  Note: Will be receiving LR @ 200 ml/hr. Problem: Mental Status - Impaired:  Goal: Absence of physical injury  Description: Absence of physical injury  Outcome: Ongoing  Note: NO physical injury this shift. Problem: Mobility - Impaired:  Goal: Mobility will improve to maximum level  Description: Mobility will improve to maximum level  Outcome: Ongoing  Note: Able to move with out difficulty. Problem: Nutrition Deficit:  Goal: Ability to achieve adequate nutritional intake will improve  Description: Ability to achieve adequate nutritional intake will improve  Outcome: Ongoing  Note: Does state that he doesn't have an appetite. Problem: Pain:  Goal: Pain level will decrease  Description: Pain level will decrease  Outcome: Ongoing  Note: Denies pain.    Goal: Ability to notify healthcare provider of pain before it becomes unmanageable or unbearable will improve  Description: Ability to notify healthcare provider of pain before it becomes unmanageable or unbearable will improve  Outcome: Ongoing  Goal: Control of acute pain  Description: Control of acute pain  Outcome: Ongoing  Goal: Control of chronic pain  Description: Control of chronic pain  Outcome: Ongoing     Problem: Serum Glucose Level - Abnormal:  Goal: Ability to maintain appropriate glucose levels will improve to within specified parameters  Description: Ability to maintain appropriate glucose levels will improve to within specified parameters  Outcome: Completed     Problem: Skin Integrity - Impaired:  Goal: Will show no infection signs and symptoms  Description: Will show no infection signs and symptoms  Outcome: Ongoing  Note: NO signs of infections. Goal: Absence of new skin breakdown  Description: Absence of new skin breakdown  Outcome: Ongoing     Problem: Sleep Pattern Disturbance:  Goal: Appears well-rested  Description: Appears well-rested  Outcome: Ongoing  Note: Appears well rested. Care plan reviewed with patient. Patient verbalize understanding of the plan of care and contribute to goal setting.

## 2020-09-19 NOTE — ED NOTES
Pt. Voided  450 ml. Via urinal. Pt. RA pulse ox with exertion =88% RA, Dr. Supriya Dumont updated.       Zion Urban RN  09/19/20 7878

## 2020-09-19 NOTE — ED NOTES
Paperwork and report given to [facility] co. EMS. Pt. Resting quietly, denies any pain. Resp. Even and nonlabored. Pt. Transported to [facility] in stable condition.      Jennifer Simms RN  09/19/20 9927

## 2020-09-19 NOTE — ED NOTES
Pt. Presents via passenger side of private auto to EMS door. Pt. Assisted into w/c and taken to exam 2, neg. Pressure room. Pt. Has positive covid test from Friday. Spouse concerned states, \"just doesn't seem like he is getting over the hump, I need some reassurance, he was up coughing all night and he has an autoimmune system\".      Jostin Mishra RN  09/19/20 0175

## 2020-09-19 NOTE — ED PROVIDER NOTES
[facility]  eMERGENCY dEPARTMENT eNCOUnter             [facility] COMPLAINT    Chief Complaint   Patient presents with    Concern For COVID-19    Fatigue       Nurses Notes reviewed and I agree except as noted in the HPI. HPI    Aquiles Tucker is a 69 y.o. male who presents with his wife. He became ill about 2 weeks ago with cough, chest congestion, extreme weakness, generalized malaise, and low-grade fevers. He had a COVID-19 test performed 8 days ago and it  was was positive. His primary care provider placed him on \"a low dose of steroids \"and Zithromax. The Zithromax is gone, he is still on a low dose of steroids. His wife brought him out of concern that he \"just is not getting any better \". He continues to have a cough, which is worsening. He becomes dyspneic with exertion, and just walking from bedroom to bathroom a few nights ago caused him to break out in a sweat\" almost pass out \". He is still eating and drinking, but not normal amounts. He has had some diarrhea, some nausea, no vomiting. He has decreased sense of taste and smell. His body hurts. 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 He stopped taking the mycophenolate when he became ill. REVIEW OF SYSTEMS      Review of Systems   Constitutional: Positive for diaphoresis (With exertion), fever (Never over 100) and malaise/fatigue. HENT: Positive for congestion. Negative for sore throat. Respiratory: Positive for cough and shortness of breath. Negative for sputum production and wheezing. Cardiovascular: Negative for chest pain, palpitations and leg swelling. Gastrointestinal: Positive for diarrhea and nausea. Negative for abdominal pain. Genitourinary: Negative. Musculoskeletal: Positive for myalgias. Skin: Negative for rash. Neurological: Positive for weakness. Negative for dizziness, focal weakness and headaches. PAST MEDICAL HISTORY     has a past medical history of GERD (gastroesophageal reflux disease) and Hyperlipidemia. SURGICAL HISTORY     has a past surgical history that includes Neck surgery and Knee arthroscopy. CURRENT MEDICATIONS    Current Discharge Medication List      CONTINUE these medications which have NOT CHANGED    Details   fluticasone (FLONASE) 50 MCG/ACT nasal spray 1 spray by Each Nare route daily  Qty: 1 Bottle, Refills: 0      acetaminophen (TYLENOL) 500 MG tablet Take 1,000 mg by mouth every 6 hours as needed for Pain      MYCOPHENOLATE MOFETIL PO Take 500 mg by mouth daily      cetirizine (ZYRTEC) 10 MG tablet Take 10 mg by mouth daily      predniSONE (DELTASONE) 10 MG tablet Take 10 mg by mouth daily      simvastatin (ZOCOR) 20 MG tablet Take 20 mg by mouth nightly      omeprazole (PRILOSEC) 20 MG capsule Take 20 mg by mouth daily             ALLERGIES    is allergic to sulfa antibiotics. FAMILY HISTORY    has no family status information on file. family history is not on file. SOCIAL HISTORY     reports that he has never smoked. He has never used smokeless tobacco. He reports current alcohol use of about 1.0 standard drinks of alcohol per week. He reports that he does not use drugs. PHYSICAL EXAM       INITIAL VITALS: BP (!) 152/91   Pulse 70   Temp 99.8 °F (37.7 °C) (Oral)   Resp 20   Ht 5' 11\" (1.803 m)   Wt 180 lb (81.6 kg)   SpO2 95%   BMI 25.10 kg/m²      Physical Exam  Vitals signs and nursing note reviewed. Exam conducted with a chaperone present. Constitutional:       Appearance: He is ill-appearing. He is not toxic-appearing or diaphoretic. HENT:      Nose:      Comments: Mask in place. Eyes:      Conjunctiva/sclera: Conjunctivae normal.      Pupils: Pupils are equal, round, and reactive to light. Neck:      Musculoskeletal: Neck supple.    Cardiovascular:      Rate and Rhythm: Normal rate and regular rhythm. Heart sounds: No murmur. Pulmonary:      Comments: Decreased breath sounds, bilateral posterior crackles throughout. No resting dyspnea. Abdominal:      General: Bowel sounds are normal.      Palpations: Abdomen is soft. There is no mass. Tenderness: There is no abdominal tenderness. Musculoskeletal:         General: No swelling or tenderness. Lymphadenopathy:      Cervical: No cervical adenopathy. Skin:     General: Skin is warm and dry. Findings: No rash. Neurological:      General: No focal deficit present. Mental Status: He is alert and oriented to person, place, and time. Psychiatric:         Behavior: Behavior normal.             DIAGNOSTIC RESULTS    EKG     Normal sinus rhythm, normal axes, normal intervals, normal EKG. RADIOLOGY:    XR CHEST PORTABLE   Final Result   Mild pneumonia bilaterally. **This report has been created using voice recognition software. It may contain minor errors which are inherent in voice recognition technology. **      Final report electronically signed by Dr. Jose A Iyer on 9/19/2020 10:34 AM         LABS:     Labs Reviewed   CBC WITH AUTO DIFFERENTIAL - Abnormal; Notable for the following components:       Result Value    RBC 4.45 (*)     MCV 95.5 (*)     MCH 32.0 (*)     MPV 7.3 (*)     All other components within normal limits   COMPREHENSIVE METABOLIC PANEL - Abnormal; Notable for the following components:    Glucose 138 (*)     Sodium 129 (*)     Chloride 92 (*)     AST 58 (*)     Alb 2.6 (*)     All other components within normal limits   SEDIMENTATION RATE - Abnormal; Notable for the following components:    Sed Rate 34 (*)     All other components within normal limits   GLOMERULAR FILTRATION RATE, ESTIMATED - Abnormal; Notable for the following components:    GFR, Estimated 78 (*)     All other components within normal limits   DIFFERENTIAL - Abnormal; Notable for the following components:    Lymphocytes

## 2020-09-19 NOTE — ED NOTES
Room assignment rec'd and spouse updated, she was advised that no visitors allowed on Covid unit, she is going to run home and get pt.'s cell phone.       Isabel Deng RN  09/19/20 2832

## 2020-09-19 NOTE — ED NOTES
EMS notified for transportation and pt to be direct admit to [place]. THOM CERON II.PABLO.       Wilma Umana RN  09/19/20 7104

## 2020-09-20 LAB
ALBUMIN SERPL-MCNC: 3.3 G/DL (ref 3.5–5.1)
ALP BLD-CCNC: 77 U/L (ref 38–126)
ALT SERPL-CCNC: 54 U/L (ref 11–66)
ANION GAP SERPL CALCULATED.3IONS-SCNC: 9 MEQ/L (ref 8–16)
APTT: 27.5 SECONDS (ref 22–38)
AST SERPL-CCNC: 55 U/L (ref 5–40)
BASOPHILS # BLD: 0.1 %
BASOPHILS ABSOLUTE: 0 THOU/MM3 (ref 0–0.1)
BILIRUB SERPL-MCNC: 0.5 MG/DL (ref 0.3–1.2)
BUN BLDV-MCNC: 15 MG/DL (ref 7–22)
C-REACTIVE PROTEIN: 7.43 MG/DL (ref 0–1)
CALCIUM SERPL-MCNC: 9.1 MG/DL (ref 8.5–10.5)
CHLORIDE BLD-SCNC: 95 MEQ/L (ref 98–111)
CO2: 29 MEQ/L (ref 23–33)
CREAT SERPL-MCNC: 0.8 MG/DL (ref 0.4–1.2)
D-DIMER QUANTITATIVE: 527 NG/ML FEU (ref 0–500)
EOSINOPHIL # BLD: 0 %
EOSINOPHILS ABSOLUTE: 0 THOU/MM3 (ref 0–0.4)
ERYTHROCYTE [DISTWIDTH] IN BLOOD BY AUTOMATED COUNT: 12.4 % (ref 11.5–14.5)
ERYTHROCYTE [DISTWIDTH] IN BLOOD BY AUTOMATED COUNT: 42.7 FL (ref 35–45)
FIBRINOGEN: 622 MG/100ML (ref 155–475)
GFR SERPL CREATININE-BSD FRML MDRD: > 90 ML/MIN/1.73M2
GLUCOSE BLD-MCNC: 140 MG/DL (ref 70–108)
HCT VFR BLD CALC: 43.9 % (ref 42–52)
HEMOGLOBIN: 14.7 GM/DL (ref 14–18)
IMMATURE GRANS (ABS): 0.05 THOU/MM3 (ref 0–0.07)
IMMATURE GRANULOCYTES: 0.5 %
INR BLD: 1.11 (ref 0.85–1.13)
LYMPHOCYTES # BLD: 5.1 %
LYMPHOCYTES ABSOLUTE: 0.6 THOU/MM3 (ref 1–4.8)
MCH RBC QN AUTO: 31.5 PG (ref 26–33)
MCHC RBC AUTO-ENTMCNC: 33.5 GM/DL (ref 32.2–35.5)
MCV RBC AUTO: 94 FL (ref 80–94)
MONOCYTES # BLD: 5.3 %
MONOCYTES ABSOLUTE: 0.6 THOU/MM3 (ref 0.4–1.3)
NUCLEATED RED BLOOD CELLS: 0 /100 WBC
PLATELET # BLD: 236 THOU/MM3 (ref 130–400)
PMV BLD AUTO: 9.3 FL (ref 9.4–12.4)
POTASSIUM REFLEX MAGNESIUM: 4.9 MEQ/L (ref 3.5–5.2)
RBC # BLD: 4.67 MILL/MM3 (ref 4.7–6.1)
SEG NEUTROPHILS: 89 %
SEGMENTED NEUTROPHILS ABSOLUTE COUNT: 9.7 THOU/MM3 (ref 1.8–7.7)
SODIUM BLD-SCNC: 133 MEQ/L (ref 135–145)
TOTAL PROTEIN: 6.6 G/DL (ref 6.1–8)
WBC # BLD: 10.9 THOU/MM3 (ref 4.8–10.8)

## 2020-09-20 PROCEDURE — 85610 PROTHROMBIN TIME: CPT

## 2020-09-20 PROCEDURE — 99232 SBSQ HOSP IP/OBS MODERATE 35: CPT | Performed by: NURSE PRACTITIONER

## 2020-09-20 PROCEDURE — 85025 COMPLETE CBC W/AUTO DIFF WBC: CPT

## 2020-09-20 PROCEDURE — 6370000000 HC RX 637 (ALT 250 FOR IP): Performed by: NURSE PRACTITIONER

## 2020-09-20 PROCEDURE — 2060000000 HC ICU INTERMEDIATE R&B

## 2020-09-20 PROCEDURE — 80053 COMPREHEN METABOLIC PANEL: CPT

## 2020-09-20 PROCEDURE — 6370000000 HC RX 637 (ALT 250 FOR IP): Performed by: INTERNAL MEDICINE

## 2020-09-20 PROCEDURE — 6360000002 HC RX W HCPCS: Performed by: INTERNAL MEDICINE

## 2020-09-20 PROCEDURE — 86140 C-REACTIVE PROTEIN: CPT

## 2020-09-20 PROCEDURE — 2580000003 HC RX 258: Performed by: INTERNAL MEDICINE

## 2020-09-20 PROCEDURE — 36415 COLL VENOUS BLD VENIPUNCTURE: CPT

## 2020-09-20 PROCEDURE — 85730 THROMBOPLASTIN TIME PARTIAL: CPT

## 2020-09-20 PROCEDURE — 85379 FIBRIN DEGRADATION QUANT: CPT

## 2020-09-20 PROCEDURE — 85385 FIBRINOGEN ANTIGEN: CPT

## 2020-09-20 RX ORDER — ZINC SULFATE 50(220)MG
50 CAPSULE ORAL DAILY
Status: DISCONTINUED | OUTPATIENT
Start: 2020-09-20 | End: 2020-09-26 | Stop reason: HOSPADM

## 2020-09-20 RX ORDER — VITAMIN B COMPLEX
1000 TABLET ORAL DAILY
Status: DISCONTINUED | OUTPATIENT
Start: 2020-09-20 | End: 2020-09-26 | Stop reason: HOSPADM

## 2020-09-20 RX ORDER — ASCORBIC ACID 500 MG
1000 TABLET ORAL DAILY
Status: DISCONTINUED | OUTPATIENT
Start: 2020-09-20 | End: 2020-09-26 | Stop reason: HOSPADM

## 2020-09-20 RX ORDER — 0.9 % SODIUM CHLORIDE 0.9 %
30 INTRAVENOUS SOLUTION INTRAVENOUS PRN
Status: DISCONTINUED | OUTPATIENT
Start: 2020-09-20 | End: 2020-09-26 | Stop reason: HOSPADM

## 2020-09-20 RX ADMIN — CETIRIZINE HYDROCHLORIDE 10 MG: 10 TABLET ORAL at 08:01

## 2020-09-20 RX ADMIN — ENOXAPARIN SODIUM 30 MG: 30 INJECTION SUBCUTANEOUS at 15:04

## 2020-09-20 RX ADMIN — ACETAMINOPHEN 650 MG: 325 TABLET ORAL at 08:01

## 2020-09-20 RX ADMIN — Medication 1000 UNITS: at 16:22

## 2020-09-20 RX ADMIN — Medication 1000 MG: at 16:22

## 2020-09-20 RX ADMIN — Medication 50 MG: at 16:22

## 2020-09-20 RX ADMIN — SODIUM CHLORIDE, POTASSIUM CHLORIDE, SODIUM LACTATE AND CALCIUM CHLORIDE: 600; 310; 30; 20 INJECTION, SOLUTION INTRAVENOUS at 16:23

## 2020-09-20 RX ADMIN — ATORVASTATIN CALCIUM 20 MG: 20 TABLET, FILM COATED ORAL at 19:54

## 2020-09-20 RX ADMIN — DEXAMETHASONE 6 MG: 4 TABLET ORAL at 08:01

## 2020-09-20 RX ADMIN — Medication 10 ML: at 08:04

## 2020-09-20 RX ADMIN — SODIUM CHLORIDE, POTASSIUM CHLORIDE, SODIUM LACTATE AND CALCIUM CHLORIDE: 600; 310; 30; 20 INJECTION, SOLUTION INTRAVENOUS at 08:26

## 2020-09-20 RX ADMIN — ENOXAPARIN SODIUM 30 MG: 30 INJECTION SUBCUTANEOUS at 03:30

## 2020-09-20 ASSESSMENT — PAIN DESCRIPTION - PAIN TYPE: TYPE: ACUTE PAIN

## 2020-09-20 ASSESSMENT — PAIN DESCRIPTION - ORIENTATION: ORIENTATION: MID

## 2020-09-20 ASSESSMENT — PAIN SCALES - GENERAL
PAINLEVEL_OUTOF10: 0
PAINLEVEL_OUTOF10: 0
PAINLEVEL_OUTOF10: 3
PAINLEVEL_OUTOF10: 0
PAINLEVEL_OUTOF10: 0

## 2020-09-20 ASSESSMENT — PAIN - FUNCTIONAL ASSESSMENT: PAIN_FUNCTIONAL_ASSESSMENT: ACTIVITIES ARE NOT PREVENTED

## 2020-09-20 ASSESSMENT — PAIN DESCRIPTION - PROGRESSION: CLINICAL_PROGRESSION: GRADUALLY WORSENING

## 2020-09-20 ASSESSMENT — PAIN DESCRIPTION - FREQUENCY: FREQUENCY: CONTINUOUS

## 2020-09-20 ASSESSMENT — PAIN DESCRIPTION - LOCATION: LOCATION: HEAD

## 2020-09-20 ASSESSMENT — PAIN DESCRIPTION - ONSET: ONSET: ON-GOING

## 2020-09-20 ASSESSMENT — PAIN DESCRIPTION - DESCRIPTORS: DESCRIPTORS: HEADACHE

## 2020-09-20 NOTE — PROGRESS NOTES
Hospitalist Progress Note    Patient:  Wanda Dickey      Unit/Bed:6A-15/015-A    YOB: 1950    MRN: [id number]       Acct: [de-identified]     PCP: Mateo Alvarado MD    Date of Admission: 9/19/2020    Assessment/Plan:    1. Acute hypoxic respiratory failure secondary to COVID 19. CXR with bilateral pneumonia, likely viral in nature. S/P convalescent plasma 9/19. Started to require oxygen yesterday, increased this AM. Wean oxygen as able. Remdesivir started. Continue Decadron, Zinc, Vit D and Vit C. Acapella. Albuterol PRN. 2. Hyponatremia due to dehydration with poor oral intake, resolved. 3. Elevated CRP due to #1.   4. Chronic steroid use due to #5. 5. HX of severe dermatitis. Holding Cellcept. Chief Complaint: SOB    Hospital Course:      76-year-old male with past medical history of dermatitis that has been controlled with CellCept and prednisone, hyperlipidemia, GERD. He tested positive for corona virus within the last day or 2 and symptoms continuously progressed at home. His wife states that he was just not turning the corner and became more short of breath and was coughing continuously throughout the night. He was brought to the emergency room he was found to be hypoxic when he would ambulate. Otherwise when he is at rest he is saturating above 90%. He has low-grade fevers and is tachypneic. He went to [facility] where chest x-ray showed bilateral pneumonia and given the progressive presentation it was recommended he be admitted for further care    Subjective (past 24 hours): Appetite improved this AM. + RAM. No SOB at rest. Denies CP. No dizziness. Max temp 100 in last 24 hours.        Medications:  Reviewed    Infusion Medications    sodium chloride 200 mL/hr at 09/19/20 0950    lactated ringers 100 mL/hr at 09/20/20 5369     Scheduled Medications    remdesivir IVPB  200 mg Intravenous Once    Followed by   Dany Conner ON 9/21/2020] remdesivir IVPB  100 mg Intravenous Q24H    cetirizine  10 mg Oral Daily    [Held by provider] mycophenolate  500 mg Oral Daily    atorvastatin  20 mg Oral Nightly    enoxaparin  30 mg Subcutaneous Q12H    dexamethasone  6 mg Oral Daily    sodium chloride flush  10 mL Intravenous 2 times per day     PRN Meds: sodium chloride, acetaminophen **OR** acetaminophen, sodium chloride flush, polyethylene glycol, promethazine **OR** ondansetron      Intake/Output Summary (Last 24 hours) at 9/20/2020 1444  Last data filed at 9/20/2020 1339  Gross per 24 hour   Intake 2504.38 ml   Output --   Net 2504.38 ml       Diet:  DIET GENERAL;    Exam:  /75   Pulse 71   Temp 99.9 °F (37.7 °C) (Oral)   Resp 22   Ht 5' 11\" (1.803 m)   Wt 180 lb (81.6 kg)   SpO2 93%   BMI 25.10 kg/m²     General appearance: No apparent distress, appears stated age and cooperative. HEENT: Pupils equal, round, and reactive to light. Conjunctivae/corneas clear. Neck: Supple, with full range of motion. No jugular venous distention. Trachea midline. Respiratory:  Normal respiratory effort. Clear to auscultation, bilaterally without Rales/Wheezes/Rhonchi. Cardiovascular: Regular rate and rhythm with normal S1/S2 without murmurs, rubs or gallops. Abdomen: Soft, non-tender, non-distended with normal bowel sounds. Musculoskeletal: passive and active ROM x 4 extremities. Skin: Skin color, texture, turgor normal.    Neurologic:  Neurovascularly intact without any focal sensory/motor deficits.  Cranial nerves: II-XII intact, grossly non-focal.  Psychiatric: Alert and oriented, thought content appropriate  Capillary Refill: Brisk,< 3 seconds   Peripheral Pulses: +2 palpable, equal bilaterally       Labs:   Recent Labs     09/19/20  0945 09/19/20  1440 09/20/20  0343   WBC 8.0 6.9 10.9*   HGB 14.2 14.7 14.7   HCT 42.5 43.2 43.9    220 236     Recent Labs     09/19/20  0945 09/19/20  1440 09/20/20  0343   * 134* 133*   K 3.7 4.1 4.9   CL 92* 97* 95*   CO2 27 26 29 BUN 17 13 15   CREATININE 1.0 0.7 0.8   CALCIUM  --  8.8 9.1     Recent Labs     09/19/20  0945 09/19/20  1440 09/20/20  0343   AST 58* 50* 55*   ALT 59 48 54   BILITOT 0.4 0.4 0.5   ALKPHOS 73 80 77     Recent Labs     09/19/20  1440 09/20/20  0343   INR 1.07 1.11     Recent Labs     09/19/20  0945   TROPONINI < 0.017     Recent Labs     09/19/20  1440   PROCAL 0.16*       Microbiology:      Urinalysis:    No results found for: Umberto Peels, BACTERIA, RBCUA, BLOODU, SPECGRAV, GLUCOSEU    Radiology:  XR CHEST PORTABLE   Final Result   Mild pneumonia bilaterally. **This report has been created using voice recognition software. It may contain minor errors which are inherent in voice recognition technology. **      Final report electronically signed by Dr. Keenan Roman on 9/19/2020 10:34 AM             Code Status: Full Code        Active Hospital Problems    Diagnosis Date Noted    Pneumonia due to COVID-19 virus [U07.1, J12.89] 09/19/2020       Electronically signed by GIBSON Bell CNP on 9/20/2020 at 2:44 PM

## 2020-09-20 NOTE — PLAN OF CARE
Nutrition Deficit:  Goal: Ability to achieve adequate nutritional intake will improve  Description: Ability to achieve adequate nutritional intake will improve  9/19/2020 2145 by Israel Moreno RN  Outcome: Ongoing  Note: Patient has had decreased appetite this shift. 9/19/2020 1715 by Casi Watson RN  Outcome: Ongoing  Note: Does state that he doesn't have an appetite. Problem: Pain:  Goal: Pain level will decrease  Description: Pain level will decrease  9/19/2020 2145 by Israel Moreno RN  Outcome: Ongoing  Note: Pain Assessment: 0-10          Is pain goal met at this time? Y           9/19/2020 1715 by Casi Watson RN  Outcome: Ongoing  Note: Denies pain. Goal: Ability to notify healthcare provider of pain before it becomes unmanageable or unbearable will improve  Description: Ability to notify healthcare provider of pain before it becomes unmanageable or unbearable will improve  9/19/2020 2145 by Israel Moreno RN  Outcome: Ongoing  9/19/2020 1715 by Casi Watson RN  Outcome: Ongoing  Goal: Control of acute pain  Description: Control of acute pain  9/19/2020 2145 by Israel Moreno RN  Outcome: Ongoing  9/19/2020 1715 by Casi Watson RN  Outcome: Ongoing  Goal: Control of chronic pain  Description: Control of chronic pain  9/19/2020 2145 by Israel Moreno RN  Outcome: Ongoing  9/19/2020 1715 by Casi Watson RN  Outcome: Ongoing     Problem: Skin Integrity - Impaired:  Goal: Will show no infection signs and symptoms  Description: Will show no infection signs and symptoms  9/19/2020 2145 by Israel Moreno RN  Outcome: Ongoing  Note: Patient has not signs of symptoms this shift. 9/19/2020 1715 by Casi Watson RN  Outcome: Ongoing  Note: NO signs of infections. Goal: Absence of new skin breakdown  Description: Absence of new skin breakdown  9/19/2020 2145 by Israel Moreno RN  Outcome: Ongoing  Note: No skin breakdown this shift.   9/19/2020 1715 by Casi Watson RN  Outcome: Ongoing     Problem: Sleep Pattern Disturbance:  Goal: Appears well-rested  Description: Appears well-rested  9/19/2020 2145 by Danielle Euceda RN  Outcome: Ongoing  Note: Patient is complaining of being tired this shift. Patient was placed on O2 and curtain drawn to help patient rest.  9/19/2020 1715 by Yariel Jo RN  Outcome: Ongoing  Note: Appears well rested. Care plan reviewed with patient. Patient verbalize understanding of the plan of care and contribute to goal setting.

## 2020-09-20 NOTE — PLAN OF CARE
Problem: Discharge Planning:  Goal: Participates in care planning  Description: Participates in care planning  Outcome: Ongoing  Note: Patient aware and updated on plan of care       Problem: Activity Intolerance:  Goal: Ability to tolerate increased activity will improve  Description: Ability to tolerate increased activity will improve  Outcome: Ongoing  Note: Patient up with standby assistance to bathroom, tolerating well     Problem: Anxiety/Stress:  Goal: Level of anxiety will decrease  Description: Level of anxiety will decrease  Outcome: Ongoing  Note: Patient calm and cooperative     Problem: Bowel Function - Altered:  Goal: Bowel elimination is within specified parameters  Description: Bowel elimination is within specified parameters  Outcome: Ongoing  Note: Diarrhea, monitoring     Problem: Fluid Volume - Deficit:  Goal: Electrolytes within specified parameters  Description: Electrolytes within specified parameters  Outcome: Ongoing  Note: , monitoring, LR at 100     Problem: Mental Status - Impaired:  Goal: Absence of physical injury  Description: Absence of physical injury  Outcome: Ongoing  Note: No falls noted this shift. Continue falling star program. Bed alarm on, bed in low position. Call light and personal belongings in reach. Patient uses call light appropriately. Problem: Mobility - Impaired:  Goal: Mobility will improve to maximum level  Description: Mobility will improve to maximum level  Outcome: Ongoing  Note: No falls noted this shift. Continue falling star program. Bed alarm on, bed in low position. Call light and personal belongings in reach. Patient uses call light appropriately.        Problem: Nutrition Deficit:  Goal: Ability to achieve adequate nutritional intake will improve  Description: Ability to achieve adequate nutritional intake will improve  Outcome: Ongoing  Note: Appetite increasing      Problem: Pain:  Goal: Pain level will decrease  Description: Pain level will decrease  Outcome: Ongoing  Note: Patient rates headache pain 3/10 this morning, pain goal 0. Tylenol given and rest, see MAR. Monitoring   Goal: Ability to notify healthcare provider of pain before it becomes unmanageable or unbearable will improve  Description: Ability to notify healthcare provider of pain before it becomes unmanageable or unbearable will improve  Outcome: Ongoing  Goal: Control of acute pain  Description: Control of acute pain  Outcome: Ongoing  Goal: Control of chronic pain  Description: Control of chronic pain  Outcome: Ongoing     Problem: Skin Integrity - Impaired:  Goal: Will show no infection signs and symptoms  Description: Will show no infection signs and symptoms  Outcome: Ongoing  Note: VSS, fever 100 this morning, tylenol given, heat turned down, blankets removed, monitoring  Goal: Absence of new skin breakdown  Description: Absence of new skin breakdown  Outcome: Ongoing  Note: No new signs or symptoms of skin breakdown noted this shift, encouraging patient to turn and reposition self in bed q2h       Problem: Sleep Pattern Disturbance:  Goal: Appears well-rested  Description: Appears well-rested  Outcome: Ongoing  Note: Patient fatigued, sleeping   Care plan reviewed with patient and wife. Patient and wife verbalize understanding of the plan of care and contribute to goal setting.

## 2020-09-21 LAB
ALBUMIN SERPL-MCNC: 2.7 G/DL (ref 3.5–5.1)
ALP BLD-CCNC: 78 U/L (ref 38–126)
ALT SERPL-CCNC: 79 U/L (ref 11–66)
ANION GAP SERPL CALCULATED.3IONS-SCNC: 12 MEQ/L (ref 8–16)
APTT: 27.7 SECONDS (ref 22–38)
AST SERPL-CCNC: 77 U/L (ref 5–40)
BASOPHILS # BLD: 0.1 %
BASOPHILS ABSOLUTE: 0 THOU/MM3 (ref 0–0.1)
BILIRUB SERPL-MCNC: 0.6 MG/DL (ref 0.3–1.2)
BILIRUBIN DIRECT: 0.3 MG/DL (ref 0–0.3)
BUN BLDV-MCNC: 16 MG/DL (ref 7–22)
C-REACTIVE PROTEIN: 5.99 MG/DL (ref 0–1)
CALCIUM SERPL-MCNC: 8.7 MG/DL (ref 8.5–10.5)
CHLORIDE BLD-SCNC: 97 MEQ/L (ref 98–111)
CO2: 27 MEQ/L (ref 23–33)
CREAT SERPL-MCNC: 0.9 MG/DL (ref 0.4–1.2)
D-DIMER QUANTITATIVE: 502 NG/ML FEU (ref 0–500)
EOSINOPHIL # BLD: 0 %
EOSINOPHILS ABSOLUTE: 0 THOU/MM3 (ref 0–0.4)
ERYTHROCYTE [DISTWIDTH] IN BLOOD BY AUTOMATED COUNT: 12.4 % (ref 11.5–14.5)
ERYTHROCYTE [DISTWIDTH] IN BLOOD BY AUTOMATED COUNT: 43.4 FL (ref 35–45)
FIBRINOGEN: 528 MG/100ML (ref 155–475)
GFR SERPL CREATININE-BSD FRML MDRD: 83 ML/MIN/1.73M2
GLUCOSE BLD-MCNC: 117 MG/DL (ref 70–108)
HCT VFR BLD CALC: 40.8 % (ref 42–52)
HEMOGLOBIN: 13.5 GM/DL (ref 14–18)
IMMATURE GRANS (ABS): 0.11 THOU/MM3 (ref 0–0.07)
IMMATURE GRANULOCYTES: 0.9 %
INR BLD: 1.19 (ref 0.85–1.13)
LEGIONELLA PNEUMOPHILIA AG, URINE: NEGATIVE
LYMPHOCYTES # BLD: 3.4 %
LYMPHOCYTES ABSOLUTE: 0.4 THOU/MM3 (ref 1–4.8)
MCH RBC QN AUTO: 31.6 PG (ref 26–33)
MCHC RBC AUTO-ENTMCNC: 33.1 GM/DL (ref 32.2–35.5)
MCV RBC AUTO: 95.6 FL (ref 80–94)
MONOCYTES # BLD: 4.8 %
MONOCYTES ABSOLUTE: 0.6 THOU/MM3 (ref 0.4–1.3)
NUCLEATED RED BLOOD CELLS: 0 /100 WBC
PLATELET # BLD: 264 THOU/MM3 (ref 130–400)
PMV BLD AUTO: 9.1 FL (ref 9.4–12.4)
POTASSIUM REFLEX MAGNESIUM: 4.2 MEQ/L (ref 3.5–5.2)
POTASSIUM SERPL-SCNC: 4.2 MEQ/L (ref 3.5–5.2)
RBC # BLD: 4.27 MILL/MM3 (ref 4.7–6.1)
SEG NEUTROPHILS: 90.8 %
SEGMENTED NEUTROPHILS ABSOLUTE COUNT: 11.5 THOU/MM3 (ref 1.8–7.7)
SODIUM BLD-SCNC: 136 MEQ/L (ref 135–145)
STREP PNEUMO AG, UR: NEGATIVE
TOTAL PROTEIN: 5.9 G/DL (ref 6.1–8)
WBC # BLD: 12.7 THOU/MM3 (ref 4.8–10.8)

## 2020-09-21 PROCEDURE — 85385 FIBRINOGEN ANTIGEN: CPT

## 2020-09-21 PROCEDURE — 6360000002 HC RX W HCPCS: Performed by: INTERNAL MEDICINE

## 2020-09-21 PROCEDURE — 85025 COMPLETE CBC W/AUTO DIFF WBC: CPT

## 2020-09-21 PROCEDURE — XW033E5 INTRODUCTION OF REMDESIVIR ANTI-INFECTIVE INTO PERIPHERAL VEIN, PERCUTANEOUS APPROACH, NEW TECHNOLOGY GROUP 5: ICD-10-PCS | Performed by: HOSPITALIST

## 2020-09-21 PROCEDURE — 2500000003 HC RX 250 WO HCPCS: Performed by: NURSE PRACTITIONER

## 2020-09-21 PROCEDURE — 80053 COMPREHEN METABOLIC PANEL: CPT

## 2020-09-21 PROCEDURE — 85730 THROMBOPLASTIN TIME PARTIAL: CPT

## 2020-09-21 PROCEDURE — 87449 NOS EACH ORGANISM AG IA: CPT

## 2020-09-21 PROCEDURE — 99232 SBSQ HOSP IP/OBS MODERATE 35: CPT | Performed by: NURSE PRACTITIONER

## 2020-09-21 PROCEDURE — 2060000000 HC ICU INTERMEDIATE R&B

## 2020-09-21 PROCEDURE — 6370000000 HC RX 637 (ALT 250 FOR IP): Performed by: INTERNAL MEDICINE

## 2020-09-21 PROCEDURE — 36415 COLL VENOUS BLD VENIPUNCTURE: CPT

## 2020-09-21 PROCEDURE — 82248 BILIRUBIN DIRECT: CPT

## 2020-09-21 PROCEDURE — 85379 FIBRIN DEGRADATION QUANT: CPT

## 2020-09-21 PROCEDURE — 85610 PROTHROMBIN TIME: CPT

## 2020-09-21 PROCEDURE — 87899 AGENT NOS ASSAY W/OPTIC: CPT

## 2020-09-21 PROCEDURE — 2580000003 HC RX 258: Performed by: EMERGENCY MEDICINE

## 2020-09-21 PROCEDURE — 86140 C-REACTIVE PROTEIN: CPT

## 2020-09-21 PROCEDURE — 2580000003 HC RX 258: Performed by: INTERNAL MEDICINE

## 2020-09-21 PROCEDURE — 2580000003 HC RX 258: Performed by: NURSE PRACTITIONER

## 2020-09-21 PROCEDURE — 6370000000 HC RX 637 (ALT 250 FOR IP): Performed by: NURSE PRACTITIONER

## 2020-09-21 RX ORDER — ALBUTEROL SULFATE 90 UG/1
2 AEROSOL, METERED RESPIRATORY (INHALATION) EVERY 6 HOURS PRN
Status: DISCONTINUED | OUTPATIENT
Start: 2020-09-21 | End: 2020-09-26 | Stop reason: HOSPADM

## 2020-09-21 RX ORDER — 0.9 % SODIUM CHLORIDE 0.9 %
30 INTRAVENOUS SOLUTION INTRAVENOUS DAILY
Status: DISCONTINUED | OUTPATIENT
Start: 2020-09-21 | End: 2020-09-26 | Stop reason: HOSPADM

## 2020-09-21 RX ADMIN — Medication 10 ML: at 08:21

## 2020-09-21 RX ADMIN — Medication 1000 UNITS: at 08:19

## 2020-09-21 RX ADMIN — Medication 1000 MG: at 08:19

## 2020-09-21 RX ADMIN — REMDESIVIR 100 MG: 5 INJECTION INTRAVENOUS at 13:49

## 2020-09-21 RX ADMIN — SODIUM CHLORIDE, POTASSIUM CHLORIDE, SODIUM LACTATE AND CALCIUM CHLORIDE: 600; 310; 30; 20 INJECTION, SOLUTION INTRAVENOUS at 03:12

## 2020-09-21 RX ADMIN — REMDESIVIR 100 MG: 100 INJECTION, POWDER, LYOPHILIZED, FOR SOLUTION INTRAVENOUS at 14:51

## 2020-09-21 RX ADMIN — SODIUM CHLORIDE: 9 INJECTION, SOLUTION INTRAVENOUS at 13:07

## 2020-09-21 RX ADMIN — SODIUM CHLORIDE 30 ML: 9 INJECTION, SOLUTION INTRAVENOUS at 14:27

## 2020-09-21 RX ADMIN — Medication 50 MG: at 08:20

## 2020-09-21 RX ADMIN — ACETAMINOPHEN 650 MG: 325 TABLET ORAL at 21:05

## 2020-09-21 RX ADMIN — ENOXAPARIN SODIUM 30 MG: 30 INJECTION SUBCUTANEOUS at 14:52

## 2020-09-21 RX ADMIN — ENOXAPARIN SODIUM 30 MG: 30 INJECTION SUBCUTANEOUS at 03:13

## 2020-09-21 RX ADMIN — ATORVASTATIN CALCIUM 20 MG: 20 TABLET, FILM COATED ORAL at 21:01

## 2020-09-21 RX ADMIN — DEXAMETHASONE 6 MG: 4 TABLET ORAL at 08:20

## 2020-09-21 RX ADMIN — CETIRIZINE HYDROCHLORIDE 10 MG: 10 TABLET ORAL at 08:19

## 2020-09-21 ASSESSMENT — PAIN SCALES - GENERAL
PAINLEVEL_OUTOF10: 0

## 2020-09-21 NOTE — PROGRESS NOTES
Physician Progress Note      PATIENT:               Chandra Phelps  CSN #:                  [id number]  :                       1950  ADMIT DATE:       2020 8:52 AM  DISCH DATE:  RESPONDING  PROVIDER #:        Debbie Mendoza CNP          QUERY TEXT:    Pt admitted with COVID-19. Pt noted to have mild pneumonia bilaterally on   CXR. If possible, please document in the progress notes and discharge summary   if you are evaluating and/or treating any of the following: The medical record reflects the following:  Risk Factors: COVID-19, advanced age  Clinical Indicators: CXR: Mild pneumonia bilaterally; acute hypoxic   respiratory failure; cough, chest congestion  Treatment: IV Rocephin, Decadron, convalescent plasma, IVF, Tylenol, CXR,   labs, coughing & deep breathing q2h, isolation    Thank you! Sunita Guido, RN, BSN, RHIT, CCDS, [facility]  RN Clinical   [phone]  Options provided:  -- COVID-19 with pneumonia  -- COVID-19 without pneumonia  -- Other - I will add my own diagnosis  -- Disagree - Not applicable / Not valid  -- Disagree - Clinically unable to determine / Unknown  -- Refer to Clinical Documentation Reviewer    PROVIDER RESPONSE TEXT:    This patient has COVID-19 with pneumonia.     Query created by: Twyla Menjivar on 2020 9:13 AM      Electronically signed by:  Serge Mendoza CNP 2020 10:18   AM

## 2020-09-21 NOTE — CARE COORDINATION
9/21/20, 7:18 AM EDT  DISCHARGE PLANNING EVALUATION:    Bryan Prajapati       Admitted from: [facility] 9/19/2020/ [facility] day: 2   Location: -15/015-A Reason for admit: Pneumonia due to COVID-19 virus [U07.1, J12.89] Status: IP  Admit order signed?: yes  PMH:  has a past medical history of GERD (gastroesophageal reflux disease) and Hyperlipidemia. Procedure: none  Pertinent abnormal Imaging:CXR    Impression:         Mild pneumonia bilaterally. Medications:  Scheduled Meds:   remdesivir IVPB  200 mg Intravenous Once    Followed by   Leyla Whalen remdesivir IVPB  100 mg Intravenous Q24H    zinc sulfate  50 mg Oral Daily    Vitamin D  1,000 Units Oral Daily    vitamin C  1,000 mg Oral Daily    cetirizine  10 mg Oral Daily    [Held by provider] mycophenolate  500 mg Oral Daily    atorvastatin  20 mg Oral Nightly    enoxaparin  30 mg Subcutaneous Q12H    dexamethasone  6 mg Oral Daily    sodium chloride flush  10 mL Intravenous 2 times per day     Continuous Infusions:   sodium chloride 200 mL/hr at 09/19/20 0950    lactated ringers 100 mL/hr at 09/21/20 1908      Pertinent Info/Orders/Treatment Plan:  INR 1.19, D-Dimer 502.00, WBC 12.7, IV fluids, IV Remdesivir, oral Decadron, Vitamin C,D and zinc, telemetry. Convalescent plasma transfusion. Diet: DIET GENERAL;   Smoking status:  reports that he has never smoked.  He has never used smokeless tobacco.   PCP: Adeline West MD  Readmission 30 days or less: no  Readmission Risk Score: 14%    Discharge Planning Evaluation  Current Residence:  Private Residence  Living Arrangements:  Spouse/Significant Other   Support Systems:  Spouse/Significant Other  Current Services PTA:     Potential Assistance Needed:  N/A  Potential Assistance Purchasing Medications:  No  Does patient want to participate in local refill/ meds to beds program?  No  Type of Home Care Services:  None  Patient expects to be discharged to:  home  Expected Discharge date:  09/23/20  Follow Up Appointment: Best Day/ Time:      Patient Goals/Plan/Treatment Preferences: Met with Chase Velazquez. He currently lives at home with his wife. Plan is to return home at discharge. He denied need for DME and declines HH at this time. Will follow for potential needs/services. Transportation/Food Security/Housekeeping Addressed:  No issues identified.     Evaluation: no

## 2020-09-21 NOTE — PLAN OF CARE
RN  Outcome: Ongoing  Note: No signs of skin breakdown. Skin warm, dry, and intact. Mucous membranes pink and moist.  Assistance with turns/ambulation provided PRN. Will continue to monitor. Problem: Skin Integrity - Impaired:  Goal: Absence of new skin breakdown  Description: Absence of new skin breakdown  9/20/2020 2151 by Raymond Turcios RN  Outcome: Ongoing  Note: No signs of skin breakdown. Skin warm, dry, and intact. Mucous membranes pink and moist.  Assistance with turns/ambulation provided PRN. Will continue to monitor. Care plan reviewed with patient. Patient verbalizes understanding of the plan of care and contributed to goal setting.

## 2020-09-22 LAB
ALBUMIN SERPL-MCNC: 2.6 G/DL (ref 3.5–5.1)
ALP BLD-CCNC: 83 U/L (ref 38–126)
ALT SERPL-CCNC: 123 U/L (ref 11–66)
ANION GAP SERPL CALCULATED.3IONS-SCNC: 8 MEQ/L (ref 8–16)
APTT: 27.2 SECONDS (ref 22–38)
AST SERPL-CCNC: 90 U/L (ref 5–40)
BASOPHILS # BLD: 0.1 %
BASOPHILS ABSOLUTE: 0 THOU/MM3 (ref 0–0.1)
BILIRUB SERPL-MCNC: 0.6 MG/DL (ref 0.3–1.2)
BUN BLDV-MCNC: 16 MG/DL (ref 7–22)
C-REACTIVE PROTEIN: 4.36 MG/DL (ref 0–1)
CALCIUM SERPL-MCNC: 8.4 MG/DL (ref 8.5–10.5)
CHLORIDE BLD-SCNC: 101 MEQ/L (ref 98–111)
CO2: 25 MEQ/L (ref 23–33)
CREAT SERPL-MCNC: 0.7 MG/DL (ref 0.4–1.2)
D-DIMER QUANTITATIVE: 479 NG/ML FEU (ref 0–500)
EOSINOPHIL # BLD: 0 %
EOSINOPHILS ABSOLUTE: 0 THOU/MM3 (ref 0–0.4)
ERYTHROCYTE [DISTWIDTH] IN BLOOD BY AUTOMATED COUNT: 12.3 % (ref 11.5–14.5)
ERYTHROCYTE [DISTWIDTH] IN BLOOD BY AUTOMATED COUNT: 42.8 FL (ref 35–45)
FIBRINOGEN: 515 MG/100ML (ref 155–475)
GFR SERPL CREATININE-BSD FRML MDRD: > 90 ML/MIN/1.73M2
GLUCOSE BLD-MCNC: 127 MG/DL (ref 70–108)
HCT VFR BLD CALC: 38.9 % (ref 42–52)
HEMOGLOBIN: 13 GM/DL (ref 14–18)
IMMATURE GRANS (ABS): 0.09 THOU/MM3 (ref 0–0.07)
IMMATURE GRANULOCYTES: 1.1 %
INR BLD: 1.17 (ref 0.85–1.13)
LYMPHOCYTES # BLD: 5.6 %
LYMPHOCYTES ABSOLUTE: 0.4 THOU/MM3 (ref 1–4.8)
MCH RBC QN AUTO: 31.5 PG (ref 26–33)
MCHC RBC AUTO-ENTMCNC: 33.4 GM/DL (ref 32.2–35.5)
MCV RBC AUTO: 94.2 FL (ref 80–94)
MONOCYTES # BLD: 7 %
MONOCYTES ABSOLUTE: 0.6 THOU/MM3 (ref 0.4–1.3)
NUCLEATED RED BLOOD CELLS: 0 /100 WBC
PLATELET # BLD: 291 THOU/MM3 (ref 130–400)
PMV BLD AUTO: 9 FL (ref 9.4–12.4)
POTASSIUM REFLEX MAGNESIUM: 4.4 MEQ/L (ref 3.5–5.2)
RBC # BLD: 4.13 MILL/MM3 (ref 4.7–6.1)
SEG NEUTROPHILS: 86.2 %
SEGMENTED NEUTROPHILS ABSOLUTE COUNT: 6.8 THOU/MM3 (ref 1.8–7.7)
SODIUM BLD-SCNC: 134 MEQ/L (ref 135–145)
TOTAL PROTEIN: 5.7 G/DL (ref 6.1–8)
WBC # BLD: 7.9 THOU/MM3 (ref 4.8–10.8)

## 2020-09-22 PROCEDURE — 6370000000 HC RX 637 (ALT 250 FOR IP): Performed by: INTERNAL MEDICINE

## 2020-09-22 PROCEDURE — 2500000003 HC RX 250 WO HCPCS: Performed by: NURSE PRACTITIONER

## 2020-09-22 PROCEDURE — 2580000003 HC RX 258: Performed by: NURSE PRACTITIONER

## 2020-09-22 PROCEDURE — 85025 COMPLETE CBC W/AUTO DIFF WBC: CPT

## 2020-09-22 PROCEDURE — 2060000000 HC ICU INTERMEDIATE R&B

## 2020-09-22 PROCEDURE — 85385 FIBRINOGEN ANTIGEN: CPT

## 2020-09-22 PROCEDURE — 6370000000 HC RX 637 (ALT 250 FOR IP): Performed by: NURSE PRACTITIONER

## 2020-09-22 PROCEDURE — 85379 FIBRIN DEGRADATION QUANT: CPT

## 2020-09-22 PROCEDURE — 85730 THROMBOPLASTIN TIME PARTIAL: CPT

## 2020-09-22 PROCEDURE — 36415 COLL VENOUS BLD VENIPUNCTURE: CPT

## 2020-09-22 PROCEDURE — 6360000002 HC RX W HCPCS: Performed by: INTERNAL MEDICINE

## 2020-09-22 PROCEDURE — 2580000003 HC RX 258: Performed by: INTERNAL MEDICINE

## 2020-09-22 PROCEDURE — 80053 COMPREHEN METABOLIC PANEL: CPT

## 2020-09-22 PROCEDURE — 99232 SBSQ HOSP IP/OBS MODERATE 35: CPT | Performed by: NURSE PRACTITIONER

## 2020-09-22 PROCEDURE — 86140 C-REACTIVE PROTEIN: CPT

## 2020-09-22 PROCEDURE — 85610 PROTHROMBIN TIME: CPT

## 2020-09-22 RX ADMIN — Medication 10 ML: at 20:04

## 2020-09-22 RX ADMIN — Medication 10 ML: at 09:05

## 2020-09-22 RX ADMIN — DEXAMETHASONE 6 MG: 4 TABLET ORAL at 09:05

## 2020-09-22 RX ADMIN — ATORVASTATIN CALCIUM 20 MG: 20 TABLET, FILM COATED ORAL at 20:29

## 2020-09-22 RX ADMIN — ENOXAPARIN SODIUM 30 MG: 30 INJECTION SUBCUTANEOUS at 04:01

## 2020-09-22 RX ADMIN — SODIUM CHLORIDE 30 ML: 9 INJECTION, SOLUTION INTRAVENOUS at 15:15

## 2020-09-22 RX ADMIN — Medication 1000 MG: at 09:05

## 2020-09-22 RX ADMIN — Medication 50 MG: at 09:04

## 2020-09-22 RX ADMIN — REMDESIVIR 100 MG: 5 INJECTION INTRAVENOUS at 14:45

## 2020-09-22 RX ADMIN — ENOXAPARIN SODIUM 30 MG: 30 INJECTION SUBCUTANEOUS at 15:03

## 2020-09-22 RX ADMIN — CETIRIZINE HYDROCHLORIDE 10 MG: 10 TABLET ORAL at 09:05

## 2020-09-22 RX ADMIN — Medication 1000 UNITS: at 09:05

## 2020-09-22 ASSESSMENT — PAIN SCALES - GENERAL
PAINLEVEL_OUTOF10: 0

## 2020-09-22 ASSESSMENT — PAIN DESCRIPTION - PROGRESSION
CLINICAL_PROGRESSION: GRADUALLY WORSENING

## 2020-09-22 NOTE — PROGRESS NOTES
Hospitalist Progress Note    Patient:  Maggy Treviño      Unit/Bed:6A-15/015-A    YOB: 1950    MRN: [id number]       Acct: [de-identified]     PCP: Izora Harada, MD    Date of Admission: 9/19/2020    Assessment/Plan:    1. Acute hypoxic respiratory failure secondary to COVID 19. CXR with bilateral pneumonia, likely viral in nature. S/P convalescent plasma 9/19. Requiring 2L/NC oxygen 9/20. Wean oxygen as able. Continue Decadron, Remdesivir, Zinc, Vit D and Vit C. Acapella. Albuterol PRN. 2. Hyponatremia due to dehydration with poor oral intake, Improved. Stop IVF. 3. Elevated CRP due to #1.   4. Chronic steroid use due to #5. 5. HX of severe dermatitis. Holding Cellcept. Chief Complaint: SOB    Hospital Course:      62-year-old male with past medical history of dermatitis that has been controlled with CellCept and prednisone, hyperlipidemia, GERD. He tested positive for corona virus within the last day or 2 and symptoms continuously progressed at home. His wife states that he was just not turning the corner and became more short of breath and was coughing continuously throughout the night. He was brought to the emergency room he was found to be hypoxic when he would ambulate. Otherwise when he is at rest he is saturating above 90%. He has low-grade fevers and is tachypneic. He went to [facility] where chest x-ray showed bilateral pneumonia and given the progressive presentation it was recommended he be admitted for further care    Subjective (past 24 hours): Reports oral intake is much improved. No pain. SOB improved. + RAM.       Medications:  Reviewed    Infusion Medications     Scheduled Medications    sodium chloride  30 mL Intravenous Daily    remdesivir IVPB  100 mg Intravenous Q24H    zinc sulfate  50 mg Oral Daily    Vitamin D  1,000 Units Oral Daily    vitamin C  1,000 mg Oral Daily    cetirizine  10 mg Oral Daily    [Held by provider] mycophenolate  500 mg Oral Daily    atorvastatin  20 mg Oral Nightly    enoxaparin  30 mg Subcutaneous Q12H    dexamethasone  6 mg Oral Daily    sodium chloride flush  10 mL Intravenous 2 times per day     PRN Meds: albuterol sulfate HFA, sodium chloride, acetaminophen **OR** acetaminophen, sodium chloride flush, polyethylene glycol, promethazine **OR** ondansetron      Intake/Output Summary (Last 24 hours) at 9/22/2020 1132  Last data filed at 9/22/2020 0622  Gross per 24 hour   Intake 132 ml   Output 550 ml   Net -418 ml       Diet:  DIET GENERAL;    Exam:  BP (!) 140/75   Pulse 58   Temp 99 °F (37.2 °C) (Oral)   Resp 24   Ht 5' 11\" (1.803 m)   Wt 180 lb (81.6 kg)   SpO2 94%   BMI 25.10 kg/m²     General appearance: No apparent distress, appears stated age and cooperative. HEENT: Pupils equal, round, and reactive to light. Conjunctivae/corneas clear. Neck: Supple, with full range of motion. No jugular venous distention. Trachea midline. Respiratory:  Normal respiratory effort. Crackles RLL. Cardiovascular: Regular rate and rhythm with normal S1/S2 without murmurs, rubs or gallops. Abdomen: Soft, non-tender, non-distended with normal bowel sounds. Musculoskeletal: passive and active ROM x 4 extremities. Skin: Skin color, texture, turgor normal.    Neurologic:  Neurovascularly intact without any focal sensory/motor deficits.  Cranial nerves: II-XII intact, grossly non-focal.  Psychiatric: Alert and oriented, thought content appropriate  Capillary Refill: Brisk,< 3 seconds   Peripheral Pulses: +2 palpable, equal bilaterally       Labs:   Recent Labs     09/20/20  0343 09/21/20  0435 09/22/20 0619   WBC 10.9* 12.7* 7.9   HGB 14.7 13.5* 13.0*   HCT 43.9 40.8* 38.9*    264 291     Recent Labs     09/20/20  0343 09/21/20  0435 09/22/20 0619   * 136 134*   K 4.9 4.2  4.2 4.4   CL 95* 97* 101   CO2 29 27 25   BUN 15 16 16   CREATININE 0.8 0.9 0.7   CALCIUM 9.1 8.7 8.4*     Recent Labs     09/20/20  0343 09/21/20  0435 09/22/20  0619   AST 55* 77* 90*   ALT 54 79* 123*   BILIDIR  --  0.3  --    BILITOT 0.5 0.6 0.6   ALKPHOS 77 78 83     Recent Labs     09/20/20  0343 09/21/20  0435 09/22/20  0619   INR 1.11 1.19* 1.17*     No results for input(s): CKTOTAL, TROPONINI in the last 72 hours. Recent Labs     09/19/20  1440   PROCAL 0.16*       Microbiology:      Urinalysis:    No results found for: Carnella Gi, BACTERIA, RBCUA, BLOODU, SPECGRAV, GLUCOSEU    Radiology:  XR CHEST PORTABLE   Final Result   Mild pneumonia bilaterally. **This report has been created using voice recognition software. It may contain minor errors which are inherent in voice recognition technology. **      Final report electronically signed by Dr. Jagruti Brizuela on 9/19/2020 10:34 AM             Code Status: Full Code        Active Hospital Problems    Diagnosis Date Noted    COVID-19 [U07.1]     Acute respiratory failure with hypoxia ([facility] [address] [J96.01]     Pneumonia due to COVID-19 virus [U07.1, J12.89] 09/19/2020       Electronically signed by GIBSON Saenz CNP on 9/22/2020 at 11:32 AM

## 2020-09-22 NOTE — CARE COORDINATION
9/22/20, 10:46 AM EDT    DISCHARGE ONGOING EVALUATION:     [address] day: 3  Location: 6A-15/015-A Reason for admit: Pneumonia due to COVID-19 virus [U07.1, J12.89]   Treatment Plan of Care: T max 99.3. O2 is at 2 liters per nasal cannula, IV fluids, oral Decadron, IV Remdesivir, Vitamin C,D and zinc. Telemetry. Barriers to Discharge: medical stability. PCP: Dania Menchaca MD  Readmission Risk Score: 17%  Patient Goals/Plan/Treatment Preferences: Plan is to return home with his wife.

## 2020-09-22 NOTE — PLAN OF CARE
Problem: Discharge Planning:  Goal: Participates in care planning  Description: Participates in care planning  Outcome: Ongoing  Note: Pt verbalizes understanding of care plan. Pt contributes to goal settings. Problem: Activity Intolerance:  Goal: Ability to tolerate increased activity will improve  Description: Ability to tolerate increased activity will improve  Outcome: Ongoing  Note: Pt able to tolerate increased activity on my shift. Will continue to assess. Problem: Bowel Function - Altered:  Goal: Bowel elimination is within specified parameters  Description: Bowel elimination is within specified parameters  Outcome: Ongoing  Note: No bowel elimination issues on my shift. Will continue to assess. Problem: Fluid Volume - Deficit:  Goal: Electrolytes within specified parameters  Description: Electrolytes within specified parameters  Outcome: Ongoing  Note: Pt electrolytes within normal limits on my shift. Will continue to monitor. Problem: Mental Status - Impaired:  Goal: Absence of physical injury  Description: Absence of physical injury  Outcome: Ongoing  Note: Absence of physical injury. Problem: Mobility - Impaired:  Goal: Mobility will improve to maximum level  Description: Mobility will improve to maximum level  Outcome: Ongoing  Note: Mobility improving to maximum level on my shift. Will continue to assess. Problem: Nutrition Deficit:  Goal: Ability to achieve adequate nutritional intake will improve  Description: Ability to achieve adequate nutritional intake will improve  Outcome: Ongoing  Note: Pt able to achieve adequate nutritional intake. Problem: Pain:  Goal: Pain level will decrease  Description: Pain level will decrease  Outcome: Ongoing  Note: Pt denies pain on my shift. Non pharmaceutical interventions like ice and repositioning offered before pain medications. Will continue to assess.        Problem: Pain:  Goal: Ability to notify healthcare provider of pain before it becomes unmanageable or unbearable will improve  Description: Ability to notify healthcare provider of pain before it becomes unmanageable or unbearable will improve  Outcome: Ongoing  Note: Pt denies pain on my shift. Non pharmaceutical interventions like ice and repositioning offered before pain medications. Will continue to assess. Problem: Pain:  Goal: Control of acute pain  Description: Control of acute pain  Outcome: Ongoing  Note: Pt denies pain on my shift. Non pharmaceutical interventions like ice and repositioning offered before pain medications. Will continue to assess. Problem: Pain:  Goal: Control of chronic pain  Description: Control of chronic pain  Outcome: Ongoing  Note: Pt denies pain on my shift. Non pharmaceutical interventions like ice and repositioning offered before pain medications. Will continue to assess. Problem: Skin Integrity - Impaired:  Goal: Will show no infection signs and symptoms  Description: Will show no infection signs and symptoms  Outcome: Ongoing  Note: Pt shows no other signs or symptoms of infection besides a slight low grade temp on my shift. Will continue to assess. Problem: Skin Integrity - Impaired:  Goal: Absence of new skin breakdown  Description: Absence of new skin breakdown  Outcome: Ongoing  Note: Absence of new skin integrity issues on my shift. Will continue to assess. Pt verbalizes understanding of care plan. Pt contributes to goal settings.

## 2020-09-23 LAB
ALBUMIN SERPL-MCNC: 3 G/DL (ref 3.5–5.1)
ALP BLD-CCNC: 90 U/L (ref 38–126)
ALT SERPL-CCNC: 122 U/L (ref 11–66)
ANION GAP SERPL CALCULATED.3IONS-SCNC: 8 MEQ/L (ref 8–16)
AST SERPL-CCNC: 61 U/L (ref 5–40)
BASOPHILS # BLD: 0.1 %
BASOPHILS ABSOLUTE: 0 THOU/MM3 (ref 0–0.1)
BILIRUB SERPL-MCNC: 0.6 MG/DL (ref 0.3–1.2)
BUN BLDV-MCNC: 17 MG/DL (ref 7–22)
CALCIUM SERPL-MCNC: 8.5 MG/DL (ref 8.5–10.5)
CHLORIDE BLD-SCNC: 98 MEQ/L (ref 98–111)
CO2: 28 MEQ/L (ref 23–33)
CREAT SERPL-MCNC: 0.8 MG/DL (ref 0.4–1.2)
CREATININE URINE: 86.7 MG/DL
EOSINOPHIL # BLD: 0 %
EOSINOPHILS ABSOLUTE: 0 THOU/MM3 (ref 0–0.4)
ERYTHROCYTE [DISTWIDTH] IN BLOOD BY AUTOMATED COUNT: 12 % (ref 11.5–14.5)
ERYTHROCYTE [DISTWIDTH] IN BLOOD BY AUTOMATED COUNT: 41.5 FL (ref 35–45)
GFR SERPL CREATININE-BSD FRML MDRD: > 90 ML/MIN/1.73M2
GLUCOSE BLD-MCNC: 116 MG/DL (ref 70–108)
HCT VFR BLD CALC: 42.1 % (ref 42–52)
HEMOGLOBIN: 13.9 GM/DL (ref 14–18)
IMMATURE GRANS (ABS): 0.1 THOU/MM3 (ref 0–0.07)
IMMATURE GRANULOCYTES: 1.2 %
LYMPHOCYTES # BLD: 7.7 %
LYMPHOCYTES ABSOLUTE: 0.6 THOU/MM3 (ref 1–4.8)
MCH RBC QN AUTO: 31.1 PG (ref 26–33)
MCHC RBC AUTO-ENTMCNC: 33 GM/DL (ref 32.2–35.5)
MCV RBC AUTO: 94.2 FL (ref 80–94)
MONOCYTES # BLD: 8.4 %
MONOCYTES ABSOLUTE: 0.7 THOU/MM3 (ref 0.4–1.3)
NUCLEATED RED BLOOD CELLS: 0 /100 WBC
OSMOLALITY URINE: 605 MOSMOL/KG (ref 250–750)
PLATELET # BLD: 351 THOU/MM3 (ref 130–400)
PMV BLD AUTO: 9 FL (ref 9.4–12.4)
POTASSIUM REFLEX MAGNESIUM: 4.7 MEQ/L (ref 3.5–5.2)
RBC # BLD: 4.47 MILL/MM3 (ref 4.7–6.1)
SEG NEUTROPHILS: 82.6 %
SEGMENTED NEUTROPHILS ABSOLUTE COUNT: 6.8 THOU/MM3 (ref 1.8–7.7)
SODIUM BLD-SCNC: 134 MEQ/L (ref 135–145)
SODIUM URINE: 102 MEQ/L
TOTAL PROTEIN: 6.1 G/DL (ref 6.1–8)
WBC # BLD: 8.2 THOU/MM3 (ref 4.8–10.8)

## 2020-09-23 PROCEDURE — 6370000000 HC RX 637 (ALT 250 FOR IP): Performed by: INTERNAL MEDICINE

## 2020-09-23 PROCEDURE — 2700000000 HC OXYGEN THERAPY PER DAY

## 2020-09-23 PROCEDURE — 6360000002 HC RX W HCPCS: Performed by: INTERNAL MEDICINE

## 2020-09-23 PROCEDURE — 80053 COMPREHEN METABOLIC PANEL: CPT

## 2020-09-23 PROCEDURE — 83935 ASSAY OF URINE OSMOLALITY: CPT

## 2020-09-23 PROCEDURE — 6370000000 HC RX 637 (ALT 250 FOR IP): Performed by: NURSE PRACTITIONER

## 2020-09-23 PROCEDURE — 84300 ASSAY OF URINE SODIUM: CPT

## 2020-09-23 PROCEDURE — 2500000003 HC RX 250 WO HCPCS: Performed by: NURSE PRACTITIONER

## 2020-09-23 PROCEDURE — 85025 COMPLETE CBC W/AUTO DIFF WBC: CPT

## 2020-09-23 PROCEDURE — 99233 SBSQ HOSP IP/OBS HIGH 50: CPT | Performed by: HOSPITALIST

## 2020-09-23 PROCEDURE — 2580000003 HC RX 258: Performed by: INTERNAL MEDICINE

## 2020-09-23 PROCEDURE — 82570 ASSAY OF URINE CREATININE: CPT

## 2020-09-23 PROCEDURE — 2580000003 HC RX 258: Performed by: NURSE PRACTITIONER

## 2020-09-23 PROCEDURE — 94761 N-INVAS EAR/PLS OXIMETRY MLT: CPT

## 2020-09-23 PROCEDURE — 36415 COLL VENOUS BLD VENIPUNCTURE: CPT

## 2020-09-23 PROCEDURE — 2060000000 HC ICU INTERMEDIATE R&B

## 2020-09-23 RX ADMIN — SODIUM CHLORIDE 30 ML: 9 INJECTION, SOLUTION INTRAVENOUS at 16:20

## 2020-09-23 RX ADMIN — Medication 1000 UNITS: at 07:51

## 2020-09-23 RX ADMIN — Medication 10 ML: at 20:29

## 2020-09-23 RX ADMIN — Medication 10 ML: at 07:54

## 2020-09-23 RX ADMIN — ENOXAPARIN SODIUM 30 MG: 30 INJECTION SUBCUTANEOUS at 15:15

## 2020-09-23 RX ADMIN — DEXAMETHASONE 6 MG: 4 TABLET ORAL at 07:52

## 2020-09-23 RX ADMIN — Medication 1000 MG: at 07:52

## 2020-09-23 RX ADMIN — CETIRIZINE HYDROCHLORIDE 10 MG: 10 TABLET ORAL at 07:51

## 2020-09-23 RX ADMIN — ENOXAPARIN SODIUM 30 MG: 30 INJECTION SUBCUTANEOUS at 03:20

## 2020-09-23 RX ADMIN — ATORVASTATIN CALCIUM 20 MG: 20 TABLET, FILM COATED ORAL at 20:29

## 2020-09-23 RX ADMIN — Medication 50 MG: at 07:52

## 2020-09-23 RX ADMIN — REMDESIVIR 100 MG: 5 INJECTION INTRAVENOUS at 15:11

## 2020-09-23 ASSESSMENT — PAIN SCALES - GENERAL
PAINLEVEL_OUTOF10: 0

## 2020-09-23 NOTE — PLAN OF CARE
decrease  9/23/2020 0817 by Luisa Avery RN  Outcome: Ongoing  Note: Patient denies pain this shift. Problem: Pain:  Goal: Ability to notify healthcare provider of pain before it becomes unmanageable or unbearable will improve  Description: Ability to notify healthcare provider of pain before it becomes unmanageable or unbearable will improve  Outcome: Ongoing  Note: Patient alert and oriented. Denies pain this shift. Problem: Pain:  Goal: Control of acute pain  Description: Control of acute pain  9/23/2020 0817 by Luisa Avery RN  Outcome: Ongoing  Note: Rakesh pain this shift     Problem: Pain:  Goal: Control of chronic pain  Description: Control of chronic pain  9/23/2020 0817 by Luisa Avery RN  Outcome: Ongoing  Note: Denies pain this shift. Problem: Skin Integrity - Impaired:  Goal: Will show no infection signs and symptoms  Description: Will show no infection signs and symptoms  9/23/2020 0817 by Luisa Avery RN  Outcome: Ongoing  Note: Remains free of s/s of infection. Problem: Skin Integrity - Impaired:  Goal: Absence of new skin breakdown  Description: Absence of new skin breakdown  9/23/2020 0817 by Luisa Avery RN  Outcome: Ongoing  Note: No new skin breakdown noted this shift. Care plan reviewed with patient. Patient verbalize understanding of the plan of care and contribute to goal setting.

## 2020-09-23 NOTE — PLAN OF CARE
Participates in care planning  Outcome: Ongoing  Note: Patient plans to go home with wife. Unknown discharge plans at this time. Patient denies any discharge needs at this time. Problem: Activity Intolerance:  Goal: Ability to tolerate increased activity will improve  Description: Ability to tolerate increased activity will improve  Outcome: Ongoing  Note: Patient on 1 L NC this shift. Patient spo2 has stayed above 90%. Patient has dyspnea on exertion. Will continue to monitor. Problem: Bowel Function - Altered:  Goal: Bowel elimination is within specified parameters  Description: Bowel elimination is within specified parameters  Outcome: Ongoing  Note: Patient has not had a bowel movement me for yet this shift. Patient bowel sounds are active and abdomen is soft and nontender. Will continue to monitor      Problem: Fluid Volume - Deficit:  Goal: Electrolytes within specified parameters  Description: Electrolytes within specified parameters  Outcome: Ongoing  Note: Patient sodium level this morning was 124. All other electrolytes are WNL. Labs in AM. Will continue to monitor       Problem: Mobility - Impaired:  Goal: Mobility will improve to maximum level  Description: Mobility will improve to maximum level  Outcome: Ongoing  Note: Patient has some dyspnea on exertion. Patient on 1 L of O2 NC. Spo2 sats have stayed above 90% at this time. Will continue to monitor. Problem: Nutrition Deficit:  Goal: Ability to achieve adequate nutritional intake will improve  Description: Ability to achieve adequate nutritional intake will improve  Outcome: Ongoing  Note: Patient states his appetite is better than it has been these last few days. Patient was able to eat most of his dinner tonight. Will continue to monitor. Problem: Skin Integrity - Impaired:  Goal: Will show no infection signs and symptoms  Description: Will show no infection signs and symptoms  Outcome: Ongoing  Note: Patient afebrile. WBC WNL. IV antibiotics per MAR. Will continue to monitor for s/s of infection. Care plan reviewed with patient. Patient verbalizes understanding of the care plan and contributed to goal setting.

## 2020-09-23 NOTE — PROGRESS NOTES
Hospitalist Progress Note      Patient:  Angelo Finley    Unit/Bed:6A-15/015-A  YOB: 1950  MRN: [id number]   Acct: [de-identified]   PCP: Doris Brewster MD  Date of Admission: 9/19/2020    Assessment/Plan:  1. Acute hypoxic respiratory failure secondary to COVID 19: S/P convalescent plasma 9/19. on Decadron, Remdesivir, Zinc, Vit D and Vit C. Acapella. Albuterol PRN. Improving. Requiring 1L/NC now from 2 yesterdayRN aware to wean as tolerated to SaO2 greater than 92%. 2. Hyponatremia: due to dehydration, Improved. Na 134 from 129 on admission; FeNa c/w hypovolemic hypotonic hypoNa. Noted IVF stopped yesterday. Given mild hypona and that pt has improved PO intake plys IVF via IV meds and in context of COVID PNA, will hold off on IVF and will trend Na. 3. HX of severe dermatitis. Mycophenolate on hold  4. Chronic steroid use due to#3. 5. Hx of HLD: on statin    Chief Complaint: SOB    Initial H and P:-    Admitted for Acute hypoxic respiratory failure secondary to COVID 19. Subjective (past 24 hours):   Feeling improved. Pt denies fever/chills/CP/SOB/urinary or GI symptoms      Past medical history, family history, social history and allergies reviewed again and is unchanged since admission. ROS (12 point review of systems completed. Pertinent positives noted.  Otherwise ROS is negative)     Medications:  Reviewed    Infusion Medications   Scheduled Medications    sodium chloride  30 mL Intravenous Daily    remdesivir IVPB  100 mg Intravenous Q24H    zinc sulfate  50 mg Oral Daily    Vitamin D  1,000 Units Oral Daily    vitamin C  1,000 mg Oral Daily    cetirizine  10 mg Oral Daily    [Held by provider] mycophenolate  500 mg Oral Daily    atorvastatin  20 mg Oral Nightly    enoxaparin  30 mg Subcutaneous Q12H    dexamethasone  6 mg Oral Daily    sodium chloride flush  10 mL Intravenous 2 times per day     PRN Meds: albuterol sulfate HFA, sodium chloride, acetaminophen **OR** acetaminophen, sodium chloride flush, polyethylene glycol, promethazine **OR** ondansetron      Intake/Output Summary (Last 24 hours) at 9/23/2020 8643  Last data filed at 9/23/2020 0745  Gross per 24 hour   Intake 2591 ml   Output 1400 ml   Net 1191 ml       Diet:  DIET GENERAL;    Exam:  /84   Pulse 58   Temp 98.1 °F (36.7 °C) (Oral)   Resp 20   Ht 5' 11\" (1.803 m)   Wt 173 lb 14.4 oz (78.9 kg)   SpO2 94%   BMI 24.25 kg/m²   General appearance: No apparent distress, appears stated age and cooperative. HEENT: Pupils equal, round, and reactive to light. Conjunctivae/corneas clear. Neck: Supple, with full range of motion. No jugular venous distention. Trachea midline. Respiratory:  Normal respiratory effort. Clear to auscultation, bilaterally without Rales/Wheezes/Rhonchi. Cardiovascular: Regular rate and rhythm with normal S1/S2 without murmurs, rubs or gallops. Abdomen: Soft, non-tender, non-distended with normal bowel sounds. Musculoskeletal: passive and active ROM x 4 extremities. Skin: Skin color, texture, turgor normal.  No rashes or lesions. Neurologic:  Neurovascularly intact without any focal sensory/motor deficits.  Cranial nerves: II-XII intact, grossly non-focal.  Psychiatric: Alert and oriented, thought content appropriate, normal insight  Capillary Refill: Brisk,< 3 seconds   Peripheral Pulses: +2 palpable, equal bilaterally     Labs:   Recent Labs     09/21/20 0435 09/22/20 0619 09/23/20 0518   WBC 12.7* 7.9 8.2   HGB 13.5* 13.0* 13.9*   HCT 40.8* 38.9* 42.1    291 351     Recent Labs     09/21/20 0435 09/22/20 0619 09/23/20  0518    134* 134*   K 4.2  4.2 4.4 4.7   CL 97* 101 98   CO2 27 25 28   BUN 16 16 17   CREATININE 0.9 0.7 0.8   CALCIUM 8.7 8.4* 8.5     Recent Labs     09/21/20 0435 09/22/20 0619 09/23/20  0518   AST 77* 90* 61*   ALT 79* 123* 122*   BILIDIR 0.3  --   --    BILITOT 0.6 0.6 0.6   ALKPHOS 78 83 90     Recent Labs     09/21/20  0435 09/22/20  0619   INR 1.19* 1.17*     No results for input(s): Riki Service in the last 72 hours. Microbiology:    Blood culture #1:   Lab Results   Component Value Date    BC No growth-preliminary  09/19/2020       Blood culture #2:No results found for: [employer] Painting    Organism:No results found for: ORG    No results found for: LABGRAM    MRSA culture only:No results found for: [facility]    Urine culture: No results found for: LABURIN    Respiratory culture: No results found for: CULTRESP    Aerobic and Anaerobic :  No results found for: LABAERO  No results found for: LABANAE    Urinalysis:    No results found for: [place], BACTERIA, RBCUA, BLOODU, SPECGRAV, GLUCOSEU    Radiology:  XR CHEST PORTABLE   Final Result   Mild pneumonia bilaterally. **This report has been created using voice recognition software. It may contain minor errors which are inherent in voice recognition technology. **      Final report electronically signed by Dr. Elan Torres on 9/19/2020 10:34 AM        Xr Chest Portable    Result Date: 9/19/2020  PROCEDURE: XR CHEST PORTABLE CLINICAL INFORMATION: cough, COVID positive. COMPARISON: Multiple previous most recent 1/29/2020 TECHNIQUE: AP upright view of the chest. FINDINGS: The heart appears mildly enlarged, increased from previous. There are mild airspace opacities within the mid and lower lung fields bilaterally consistent with pneumonia. Mild septal thickening is noted. Mild pneumonia bilaterally. **This report has been created using voice recognition software. It may contain minor errors which are inherent in voice recognition technology. ** Final report electronically signed by Dr. Elan Torres on 9/19/2020 10:34 AM    With RN in room, patient was updated about and agreed upon the treatment plan, all the questions and concerns were addressed. Patient was seen in PPE (PERSONAL PROTECTIVE EQUIPMENT).    Patient was interviewed in Strict Airborne and Droplet Precautions.     Electronically signed by Nickolas Barnes MD on 9/23/2020 at 8:22 AM

## 2020-09-23 NOTE — CARE COORDINATION
9/23/20, 11:40 AM EDT    DISCHARGE ONGOING EVALUATION:     [address] day: 4  Location: -09/009-A Reason for admit: Pneumonia due to COVID-19 virus [U07.1, J12.89]   Treatment Plan of Care: Tmax 98.6. O2 sat is 95% on 1.5 liters oxygen via nasal cannula. Oral Decadron, IV Remdesivir, Vitamin C,D,and zinc.  Barriers to Discharge: completion of IV Remdesivir. Medical stability. PCP: Jasmyn Izaguirre MD  Readmission Risk Score: 15%  Patient Goals/Plan/Treatment Preferences: Plan is to return home with spouse.

## 2020-09-24 LAB
ALBUMIN SERPL-MCNC: 3 G/DL (ref 3.5–5.1)
ALP BLD-CCNC: 89 U/L (ref 38–126)
ALT SERPL-CCNC: 114 U/L (ref 11–66)
ANION GAP SERPL CALCULATED.3IONS-SCNC: 9 MEQ/L (ref 8–16)
AST SERPL-CCNC: 51 U/L (ref 5–40)
BASOPHILS # BLD: 0.3 %
BASOPHILS ABSOLUTE: 0 THOU/MM3 (ref 0–0.1)
BILIRUB SERPL-MCNC: 0.7 MG/DL (ref 0.3–1.2)
BUN BLDV-MCNC: 20 MG/DL (ref 7–22)
CALCIUM SERPL-MCNC: 8.5 MG/DL (ref 8.5–10.5)
CHLORIDE BLD-SCNC: 99 MEQ/L (ref 98–111)
CO2: 28 MEQ/L (ref 23–33)
CREAT SERPL-MCNC: 1 MG/DL (ref 0.4–1.2)
EOSINOPHIL # BLD: 0 %
EOSINOPHILS ABSOLUTE: 0 THOU/MM3 (ref 0–0.4)
ERYTHROCYTE [DISTWIDTH] IN BLOOD BY AUTOMATED COUNT: 12 % (ref 11.5–14.5)
ERYTHROCYTE [DISTWIDTH] IN BLOOD BY AUTOMATED COUNT: 41.7 FL (ref 35–45)
GFR SERPL CREATININE-BSD FRML MDRD: 74 ML/MIN/1.73M2
GLUCOSE BLD-MCNC: 108 MG/DL (ref 70–108)
HCT VFR BLD CALC: 43 % (ref 42–52)
HEMOGLOBIN: 14.3 GM/DL (ref 14–18)
IMMATURE GRANS (ABS): 0.11 THOU/MM3 (ref 0–0.07)
IMMATURE GRANULOCYTES: 1.1 %
LYMPHOCYTES # BLD: 7.6 %
LYMPHOCYTES ABSOLUTE: 0.7 THOU/MM3 (ref 1–4.8)
MCH RBC QN AUTO: 31.3 PG (ref 26–33)
MCHC RBC AUTO-ENTMCNC: 33.3 GM/DL (ref 32.2–35.5)
MCV RBC AUTO: 94.1 FL (ref 80–94)
MONOCYTES # BLD: 7.8 %
MONOCYTES ABSOLUTE: 0.8 THOU/MM3 (ref 0.4–1.3)
NUCLEATED RED BLOOD CELLS: 0 /100 WBC
PLATELET # BLD: 395 THOU/MM3 (ref 130–400)
PMV BLD AUTO: 9 FL (ref 9.4–12.4)
POTASSIUM REFLEX MAGNESIUM: 4.5 MEQ/L (ref 3.5–5.2)
RBC # BLD: 4.57 MILL/MM3 (ref 4.7–6.1)
SEG NEUTROPHILS: 83.2 %
SEGMENTED NEUTROPHILS ABSOLUTE COUNT: 8.2 THOU/MM3 (ref 1.8–7.7)
SODIUM BLD-SCNC: 136 MEQ/L (ref 135–145)
TOTAL PROTEIN: 6.1 G/DL (ref 6.1–8)
WBC # BLD: 9.8 THOU/MM3 (ref 4.8–10.8)

## 2020-09-24 PROCEDURE — 2580000003 HC RX 258: Performed by: NURSE PRACTITIONER

## 2020-09-24 PROCEDURE — 85025 COMPLETE CBC W/AUTO DIFF WBC: CPT

## 2020-09-24 PROCEDURE — 2700000000 HC OXYGEN THERAPY PER DAY

## 2020-09-24 PROCEDURE — 94761 N-INVAS EAR/PLS OXIMETRY MLT: CPT

## 2020-09-24 PROCEDURE — 2500000003 HC RX 250 WO HCPCS: Performed by: NURSE PRACTITIONER

## 2020-09-24 PROCEDURE — 2060000000 HC ICU INTERMEDIATE R&B

## 2020-09-24 PROCEDURE — 6360000002 HC RX W HCPCS: Performed by: INTERNAL MEDICINE

## 2020-09-24 PROCEDURE — 36415 COLL VENOUS BLD VENIPUNCTURE: CPT

## 2020-09-24 PROCEDURE — 2580000003 HC RX 258: Performed by: INTERNAL MEDICINE

## 2020-09-24 PROCEDURE — 6370000000 HC RX 637 (ALT 250 FOR IP): Performed by: INTERNAL MEDICINE

## 2020-09-24 PROCEDURE — 6370000000 HC RX 637 (ALT 250 FOR IP): Performed by: NURSE PRACTITIONER

## 2020-09-24 PROCEDURE — 6370000000 HC RX 637 (ALT 250 FOR IP): Performed by: HOSPITALIST

## 2020-09-24 PROCEDURE — 99233 SBSQ HOSP IP/OBS HIGH 50: CPT | Performed by: HOSPITALIST

## 2020-09-24 PROCEDURE — 80053 COMPREHEN METABOLIC PANEL: CPT

## 2020-09-24 RX ORDER — ASPIRIN 81 MG/1
81 TABLET, CHEWABLE ORAL DAILY
Status: DISCONTINUED | OUTPATIENT
Start: 2020-09-24 | End: 2020-09-26 | Stop reason: HOSPADM

## 2020-09-24 RX ADMIN — DEXAMETHASONE 6 MG: 4 TABLET ORAL at 08:44

## 2020-09-24 RX ADMIN — ATORVASTATIN CALCIUM 20 MG: 20 TABLET, FILM COATED ORAL at 22:36

## 2020-09-24 RX ADMIN — Medication 50 MG: at 08:44

## 2020-09-24 RX ADMIN — Medication 1000 UNITS: at 08:44

## 2020-09-24 RX ADMIN — Medication 1000 MG: at 08:44

## 2020-09-24 RX ADMIN — ENOXAPARIN SODIUM 30 MG: 30 INJECTION SUBCUTANEOUS at 03:08

## 2020-09-24 RX ADMIN — Medication 10 ML: at 20:16

## 2020-09-24 RX ADMIN — CETIRIZINE HYDROCHLORIDE 10 MG: 10 TABLET ORAL at 08:44

## 2020-09-24 RX ADMIN — REMDESIVIR 100 MG: 5 INJECTION INTRAVENOUS at 15:39

## 2020-09-24 RX ADMIN — Medication 10 ML: at 08:45

## 2020-09-24 RX ADMIN — ASPIRIN 81 MG: 81 TABLET, CHEWABLE ORAL at 10:34

## 2020-09-24 RX ADMIN — ENOXAPARIN SODIUM 30 MG: 30 INJECTION SUBCUTANEOUS at 15:39

## 2020-09-24 ASSESSMENT — PAIN SCALES - GENERAL
PAINLEVEL_OUTOF10: 0

## 2020-09-24 NOTE — PROGRESS NOTES
Hospitalist Progress Note      Patient:  Eric Desai    Unit/Bed:4B-09/009-A  YOB: 1950  MRN: [id number]   Acct: [de-identified]   PCP: Geovanni Nelson MD  Date of Admission: 9/19/2020    Assessment/Plan:  1. Acute hypoxic respiratory failure secondary to COVID 19: S/P convalescent plasma 9/19. on Decadron, Remdesivir, Zinc, Vit D and Vit C. Acapella. Albuterol PRN. Improving. Requiring 1L/NC now from 2 yesterdayRN aware to wean as tolerated to SaO2 greater than 92%. 9/24: improving slowly, ambulating well w/ Physio. Given elevated prothrombotic markers, started on ASA 81 QD. CCM  2. Hyponatremia: due to dehydration, Improved. Na 134 from 129 on admission; FeNa c/w hypovolemic hypotonic hypoNa. Noted IVF stopped yesterday. Given mild hypona and that pt has improved PO intake plys IVF via IV meds and in context of COVID PNA, will hold off on IVF and will trend Na.   9/24: resolved. Na 136. Will monitor     3. HX of severe dermatitis. Mycophenolate on hold  4. Chronic steroid use due to#3. 5. Hx of HLD: on statin    Chief Complaint: SOB    Initial H and P:-    Admitted for Acute hypoxic respiratory failure secondary to COVID 19. Subjective (past 24 hours):   Feeling improved. Pt denies fever/chills/CP/SOB/urinary or GI symptoms      Past medical history, family history, social history and allergies reviewed again and is unchanged since admission. ROS (12 point review of systems completed. Pertinent positives noted.  Otherwise ROS is negative)     Medications:  Reviewed    Infusion Medications   Scheduled Medications    sodium chloride  30 mL Intravenous Daily    remdesivir IVPB  100 mg Intravenous Q24H    zinc sulfate  50 mg Oral Daily    Vitamin D  1,000 Units Oral Daily    vitamin C  1,000 mg Oral Daily    cetirizine  10 mg Oral Daily    [Held by provider] mycophenolate  500 mg Oral Daily    atorvastatin  20 mg Oral Nightly    enoxaparin  30 mg Subcutaneous Q12H    dexamethasone  6 mg Oral Daily    sodium chloride flush  10 mL Intravenous 2 times per day     PRN Meds: albuterol sulfate HFA, sodium chloride, acetaminophen **OR** acetaminophen, sodium chloride flush, polyethylene glycol, promethazine **OR** ondansetron      Intake/Output Summary (Last 24 hours) at 9/24/2020 1003  Last data filed at 9/23/2020 1841  Gross per 24 hour   Intake 950 ml   Output --   Net 950 ml       Diet:  DIET GENERAL;    Exam:  /75   Pulse 58   Temp 98.1 °F (36.7 °C) (Oral)   Resp 16   Ht 5' 11\" (1.803 m)   Wt 173 lb 14.4 oz (78.9 kg)   SpO2 97%   BMI 24.25 kg/m²   General appearance: No apparent distress, appears stated age and cooperative. HEENT: Pupils equal, round, and reactive to light. Conjunctivae/corneas clear. Neck: Supple, with full range of motion. No jugular venous distention. Trachea midline. Respiratory:  Normal respiratory effort. Clear to auscultation, bilaterally without Rales/Wheezes/Rhonchi. Cardiovascular: Regular rate and rhythm with normal S1/S2 without murmurs, rubs or gallops. Abdomen: Soft, non-tender, non-distended with normal bowel sounds. Musculoskeletal: passive and active ROM x 4 extremities. Skin: Skin color, texture, turgor normal.  No rashes or lesions. Neurologic:  Neurovascularly intact without any focal sensory/motor deficits.  Cranial nerves: II-XII intact, grossly non-focal.  Psychiatric: Alert and oriented, thought content appropriate, normal insight  Capillary Refill: Brisk,< 3 seconds   Peripheral Pulses: +2 palpable, equal bilaterally     Labs:   Recent Labs     09/22/20  0619 09/23/20  0518 09/24/20  0517   WBC 7.9 8.2 9.8   HGB 13.0* 13.9* 14.3   HCT 38.9* 42.1 43.0    351 395     Recent Labs     09/22/20  0619 09/23/20  0518 09/24/20  0517   * 134* 136   K 4.4 4.7 4.5    98 99   CO2 25 28 28   BUN 16 17 20   CREATININE 0.7 0.8 1.0   CALCIUM 8.4* 8.5 8.5     Recent Labs 09/22/20  0619 09/23/20  0518 09/24/20  0517   AST 90* 61* 51*   * 122* 114*   BILITOT 0.6 0.6 0.7   ALKPHOS 83 90 89     Recent Labs     09/22/20  0619   INR 1.17*     No results for input(s): CKTOTAL, TROPONINI in the last 72 hours. Microbiology:    Blood culture #1:   Lab Results   Component Value Date    BC No growth-preliminary  09/19/2020       Blood culture #2:No results found for: Gene Morales    Organism:No results found for: ORG    No results found for: LABGRAM    MRSA culture only:No results found for: [facility]    Urine culture: No results found for: LABURIN    Respiratory culture: No results found for: CULTRESP    Aerobic and Anaerobic :  No results found for: LABAERO  No results found for: LABANAE    Urinalysis:    No results found for: Maria Ines Ren, BACTERIA, RBCUA, BLOODU, SPECGRAV, GLUCOSEU    Radiology:  XR CHEST PORTABLE   Final Result   Mild pneumonia bilaterally. **This report has been created using voice recognition software. It may contain minor errors which are inherent in voice recognition technology. **      Final report electronically signed by Dr. Gaudencio Miller on 9/19/2020 10:34 AM        Xr Chest Portable    Result Date: 9/19/2020  PROCEDURE: XR CHEST PORTABLE CLINICAL INFORMATION: cough, COVID positive. COMPARISON: Multiple previous most recent 1/29/2020 TECHNIQUE: AP upright view of the chest. FINDINGS: The heart appears mildly enlarged, increased from previous. There are mild airspace opacities within the mid and lower lung fields bilaterally consistent with pneumonia. Mild septal thickening is noted. Mild pneumonia bilaterally. **This report has been created using voice recognition software. It may contain minor errors which are inherent in voice recognition technology. ** Final report electronically signed by Dr. Gaudencio Miller on 9/19/2020 10:34 AM    With RN in room, patient was updated about and agreed upon the treatment plan, all the questions and concerns were addressed. Patient was seen in PPE (PERSONAL PROTECTIVE EQUIPMENT). Patient was interviewed in Strict Airborne and Droplet Precautions.     Electronically signed by Souleymane Bryant MD on 9/24/2020 at 10:03 AM

## 2020-09-24 NOTE — PLAN OF CARE
Problem: Discharge Planning:  Goal: Participates in care planning  Description: Participates in care planning  Outcome: Ongoing  Note: Patient asks appropriate and realistic questions about plan of care and discharge planning. Questions and concerns addressed. Problem: Activity Intolerance:  Goal: Ability to tolerate increased activity will improve  Description: Ability to tolerate increased activity will improve  Outcome: Ongoing  Note: Oxygen saturations noted to have dropped overnight with exertion, remain stable this morning. Oxygen per nasal cannula decreased to 3L, tolerating well. Problem: Bowel Function - Altered:  Goal: Bowel elimination is within specified parameters  Description: Bowel elimination is within specified parameters  Outcome: Ongoing  Note: Active bowel sounds noted x4, ABD soft, nontender. Large BM noted within last 24 hours. Problem: Fluid Volume - Deficit:  Goal: Electrolytes within specified parameters  Description: Electrolytes within specified parameters  Outcome: Ongoing  Note: Replacement protocols in place. Problem: Mental Status - Impaired:  Goal: Absence of physical injury  Description: Absence of physical injury  Outcome: Ongoing     Problem: Nutrition Deficit:  Goal: Ability to achieve adequate nutritional intake will improve  Description: Ability to achieve adequate nutritional intake will improve  Outcome: Ongoing  Note: Appetite appropriate, fluids encouraged. Problem: Pain:  Goal: Pain level will decrease  Description: Pain level will decrease  Outcome: Ongoing  Note: Denies any pain or discomfort at this time. Problem: Skin Integrity - Impaired:  Goal: Will show no infection signs and symptoms  Description: Will show no infection signs and symptoms  Outcome: Ongoing  Note: Turns/repositions self frequently. Pillow support provided to heels and elbows. Care plan reviewed with patient.  Patient verbalizes understanding of the care plan and contributed to goal setting.

## 2020-09-25 LAB
ALBUMIN SERPL-MCNC: 2.8 G/DL (ref 3.5–5.1)
ALP BLD-CCNC: 87 U/L (ref 38–126)
ALT SERPL-CCNC: 119 U/L (ref 11–66)
ANION GAP SERPL CALCULATED.3IONS-SCNC: 8 MEQ/L (ref 8–16)
AST SERPL-CCNC: 56 U/L (ref 5–40)
BASOPHILS # BLD: 0.3 %
BASOPHILS ABSOLUTE: 0 THOU/MM3 (ref 0–0.1)
BILIRUB SERPL-MCNC: 0.7 MG/DL (ref 0.3–1.2)
BLOOD CULTURE, ROUTINE: NORMAL
BLOOD CULTURE, ROUTINE: NORMAL
BUN BLDV-MCNC: 19 MG/DL (ref 7–22)
CALCIUM SERPL-MCNC: 8.9 MG/DL (ref 8.5–10.5)
CHLORIDE BLD-SCNC: 102 MEQ/L (ref 98–111)
CO2: 28 MEQ/L (ref 23–33)
CREAT SERPL-MCNC: 0.9 MG/DL (ref 0.4–1.2)
EOSINOPHIL # BLD: 0.3 %
EOSINOPHILS ABSOLUTE: 0 THOU/MM3 (ref 0–0.4)
ERYTHROCYTE [DISTWIDTH] IN BLOOD BY AUTOMATED COUNT: 12.1 % (ref 11.5–14.5)
ERYTHROCYTE [DISTWIDTH] IN BLOOD BY AUTOMATED COUNT: 41.8 FL (ref 35–45)
GFR SERPL CREATININE-BSD FRML MDRD: 83 ML/MIN/1.73M2
GLUCOSE BLD-MCNC: 93 MG/DL (ref 70–108)
HCT VFR BLD CALC: 42.7 % (ref 42–52)
HEMOGLOBIN: 14.3 GM/DL (ref 14–18)
IMMATURE GRANS (ABS): 0.14 THOU/MM3 (ref 0–0.07)
IMMATURE GRANULOCYTES: 1.8 %
LYMPHOCYTES # BLD: 10.5 %
LYMPHOCYTES ABSOLUTE: 0.8 THOU/MM3 (ref 1–4.8)
MCH RBC QN AUTO: 31.7 PG (ref 26–33)
MCHC RBC AUTO-ENTMCNC: 33.5 GM/DL (ref 32.2–35.5)
MCV RBC AUTO: 94.7 FL (ref 80–94)
MONOCYTES # BLD: 9.9 %
MONOCYTES ABSOLUTE: 0.8 THOU/MM3 (ref 0.4–1.3)
NUCLEATED RED BLOOD CELLS: 0 /100 WBC
PLATELET # BLD: 423 THOU/MM3 (ref 130–400)
PMV BLD AUTO: 9.1 FL (ref 9.4–12.4)
POTASSIUM REFLEX MAGNESIUM: 4.4 MEQ/L (ref 3.5–5.2)
RBC # BLD: 4.51 MILL/MM3 (ref 4.7–6.1)
SEG NEUTROPHILS: 77.2 %
SEGMENTED NEUTROPHILS ABSOLUTE COUNT: 6.2 THOU/MM3 (ref 1.8–7.7)
SODIUM BLD-SCNC: 138 MEQ/L (ref 135–145)
TOTAL PROTEIN: 6 G/DL (ref 6.1–8)
WBC # BLD: 8 THOU/MM3 (ref 4.8–10.8)

## 2020-09-25 PROCEDURE — 80053 COMPREHEN METABOLIC PANEL: CPT

## 2020-09-25 PROCEDURE — 85025 COMPLETE CBC W/AUTO DIFF WBC: CPT

## 2020-09-25 PROCEDURE — 36415 COLL VENOUS BLD VENIPUNCTURE: CPT

## 2020-09-25 PROCEDURE — 2580000003 HC RX 258: Performed by: INTERNAL MEDICINE

## 2020-09-25 PROCEDURE — 6370000000 HC RX 637 (ALT 250 FOR IP): Performed by: HOSPITALIST

## 2020-09-25 PROCEDURE — 6360000002 HC RX W HCPCS: Performed by: INTERNAL MEDICINE

## 2020-09-25 PROCEDURE — 6370000000 HC RX 637 (ALT 250 FOR IP): Performed by: NURSE PRACTITIONER

## 2020-09-25 PROCEDURE — 6370000000 HC RX 637 (ALT 250 FOR IP): Performed by: INTERNAL MEDICINE

## 2020-09-25 PROCEDURE — 2500000003 HC RX 250 WO HCPCS: Performed by: NURSE PRACTITIONER

## 2020-09-25 PROCEDURE — 2060000000 HC ICU INTERMEDIATE R&B

## 2020-09-25 PROCEDURE — 99233 SBSQ HOSP IP/OBS HIGH 50: CPT | Performed by: HOSPITALIST

## 2020-09-25 PROCEDURE — 2580000003 HC RX 258: Performed by: NURSE PRACTITIONER

## 2020-09-25 RX ADMIN — ASPIRIN 81 MG: 81 TABLET, CHEWABLE ORAL at 09:38

## 2020-09-25 RX ADMIN — Medication 10 ML: at 20:23

## 2020-09-25 RX ADMIN — CETIRIZINE HYDROCHLORIDE 10 MG: 10 TABLET ORAL at 09:38

## 2020-09-25 RX ADMIN — ENOXAPARIN SODIUM 30 MG: 30 INJECTION SUBCUTANEOUS at 14:51

## 2020-09-25 RX ADMIN — Medication 50 MG: at 09:38

## 2020-09-25 RX ADMIN — ENOXAPARIN SODIUM 30 MG: 30 INJECTION SUBCUTANEOUS at 03:43

## 2020-09-25 RX ADMIN — ATORVASTATIN CALCIUM 20 MG: 20 TABLET, FILM COATED ORAL at 20:22

## 2020-09-25 RX ADMIN — Medication 1000 UNITS: at 09:38

## 2020-09-25 RX ADMIN — Medication 10 ML: at 09:38

## 2020-09-25 RX ADMIN — Medication 1000 MG: at 09:38

## 2020-09-25 RX ADMIN — REMDESIVIR 100 MG: 5 INJECTION INTRAVENOUS at 14:48

## 2020-09-25 RX ADMIN — SODIUM CHLORIDE 30 ML: 9 INJECTION, SOLUTION INTRAVENOUS at 14:48

## 2020-09-25 RX ADMIN — DEXAMETHASONE 6 MG: 4 TABLET ORAL at 09:38

## 2020-09-25 ASSESSMENT — PAIN SCALES - GENERAL
PAINLEVEL_OUTOF10: 0

## 2020-09-25 NOTE — PROGRESS NOTES
Patient walked halls for 2nd time this shift and maintained oxygen saturations above 90%. No complaints of shortness of breath.

## 2020-09-25 NOTE — CARE COORDINATION
9/25/20, 12:58 PM EDT  DISCHARGE ON GOING [facility] day: 6  Location: [facility]/016A Reason for admit: Pneumonia due to COVID-19 virus [U07.1, J12.89]   Treatment plan of care: Pt admitted with COVID 19, s/p convalescent plasma on 9/19. Continues on Decadron, Remdesivir, Zinc, Vit D and Vit C. Pt on room air today. Barriers to Discharge: medical stability  PCP: Bereket Walker MD  Readmission Risk Score: 12%  Patient Goals/Plan/Treatment Preferences: Pt is from home with spouse. Plan is for return home at discharge. Will monitor for needs.

## 2020-09-25 NOTE — PLAN OF CARE
Problem: Discharge Planning:  Goal: Participates in care planning  Description: Participates in care planning  Outcome: Ongoing  Note: Participates in care planning, plan is to return home with spouse no other needs expressed. Problem: Activity Intolerance:  Goal: Ability to tolerate increased activity will improve  Description: Ability to tolerate increased activity will improve  Outcome: Ongoing  Note: Patient ambulating around the room this shift. Patient on room air while awake. Problem: Pain:  Goal: Pain level will decrease  Description: Pain level will decrease  Outcome: Completed     Problem: Skin Integrity - Impaired:  Goal: Will show no infection signs and symptoms  Description: Will show no infection signs and symptoms  Outcome: Ongoing  Note: Patient shows no signs/symptoms of skin breakdown with ambulation present this shift. Care plan reviewed with patient. Patient verbalizes understanding of the plan of care and contribute to goal setting.

## 2020-09-25 NOTE — PROGRESS NOTES
Recent Labs     09/23/20  0518 09/24/20  0517 09/25/20  0612   * 136 138   K 4.7 4.5 4.4   CL 98 99 102   CO2 28 28 28   BUN 17 20 19   CREATININE 0.8 1.0 0.9   CALCIUM 8.5 8.5 8.9     Recent Labs     09/23/20  0518 09/24/20  0517 09/25/20  0612   AST 61* 51* 56*   * 114* 119*   BILITOT 0.6 0.7 0.7   ALKPHOS 90 89 87     No results for input(s): INR in the last 72 hours. No results for input(s): [place] in the last 72 hours. Microbiology:    Blood culture #1:   Lab Results   Component Value Date    BC No growth-preliminary No growth  09/19/2020       Blood culture #2:No results found for: Presidio Furry    Organism:No results found for: ORG    No results found for: LABGRAM    MRSA culture only:No results found for: [facility]    Urine culture: No results found for: LABURIN    Respiratory culture: No results found for: CULTRESP    Aerobic and Anaerobic :  No results found for: LABAERO  No results found for: LABANAE    Urinalysis:    No results found for: Staci Pedro, BACTERIA, RBCUA, BLOODU, SPECGRAV, GLUCOSEU    Radiology:  XR CHEST PORTABLE   Final Result   Mild pneumonia bilaterally. **This report has been created using voice recognition software. It may contain minor errors which are inherent in voice recognition technology. **      Final report electronically signed by Dr. Bowen Abbasi on 9/19/2020 10:34 AM        Xr Chest Portable    Result Date: 9/19/2020  PROCEDURE: XR CHEST PORTABLE CLINICAL INFORMATION: cough, COVID positive. COMPARISON: Multiple previous most recent 1/29/2020 TECHNIQUE: AP upright view of the chest. FINDINGS: The heart appears mildly enlarged, increased from previous. There are mild airspace opacities within the mid and lower lung fields bilaterally consistent with pneumonia. Mild septal thickening is noted. Mild pneumonia bilaterally. **This report has been created using voice recognition software.  It may contain minor errors which are inherent in voice recognition technology. ** Final report electronically signed by Dr. Melissa Velásquez on 9/19/2020 10:34 AM    With RN in room, patient was updated about and agreed upon the treatment plan, all the questions and concerns were addressed. Patient was seen in PPE (PERSONAL PROTECTIVE EQUIPMENT). Patient was interviewed in Strict Airborne and Droplet Precautions.     Electronically signed by Yenifer Garcia MD on 9/25/2020 at 9:55 AM

## 2020-09-25 NOTE — PLAN OF CARE
Problem: Discharge Planning:  Goal: Participates in care planning  Description: Participates in care planning  Outcome: Ongoing  Note: Plans for discharge tomorrow. Problem: Activity Intolerance:  Goal: Ability to tolerate increased activity will improve  Description: Ability to tolerate increased activity will improve  Outcome: Ongoing  Note: Walking halls today, tolerating well on room air. Problem: Bowel Function - Altered:  Goal: Bowel elimination is within specified parameters  Description: Bowel elimination is within specified parameters  Outcome: Ongoing  Note: No complaints. Problem: Fluid Volume - Deficit:  Goal: Electrolytes within specified parameters  Description: Electrolytes within specified parameters  Outcome: Ongoing  Note: WDL. Problem: Nutrition Deficit:  Goal: Ability to achieve adequate nutritional intake will improve  Description: Ability to achieve adequate nutritional intake will improve  Outcome: Ongoing  Note: Eating well. Problem: Skin Integrity - Impaired:  Goal: Will show no infection signs and symptoms  Description: Will show no infection signs and symptoms  Outcome: Ongoing  Note: No new breakdown this shift. Pillow support. Encourage repositioning. Goal: Absence of new skin breakdown  Description: Absence of new skin breakdown  Outcome: Ongoing  Note: No new breakdown this shift. Pillow support. Encourage repositioning. Care plan reviewed with patient. Patient verbalize understanding of the plan of care and contribute to goal setting.

## 2020-09-26 VITALS
OXYGEN SATURATION: 93 % | TEMPERATURE: 97.3 F | BODY MASS INDEX: 24.22 KG/M2 | SYSTOLIC BLOOD PRESSURE: 106 MMHG | RESPIRATION RATE: 22 BRPM | HEIGHT: 71 IN | WEIGHT: 173 LBS | DIASTOLIC BLOOD PRESSURE: 75 MMHG | HEART RATE: 62 BPM

## 2020-09-26 LAB
ALBUMIN SERPL-MCNC: 2.9 G/DL (ref 3.5–5.1)
ALP BLD-CCNC: 79 U/L (ref 38–126)
ALT SERPL-CCNC: 102 U/L (ref 11–66)
ANION GAP SERPL CALCULATED.3IONS-SCNC: 9 MEQ/L (ref 8–16)
AST SERPL-CCNC: 40 U/L (ref 5–40)
BASOPHILS # BLD: 0.3 %
BASOPHILS ABSOLUTE: 0 THOU/MM3 (ref 0–0.1)
BILIRUB SERPL-MCNC: 0.7 MG/DL (ref 0.3–1.2)
BUN BLDV-MCNC: 22 MG/DL (ref 7–22)
CALCIUM SERPL-MCNC: 8.8 MG/DL (ref 8.5–10.5)
CHLORIDE BLD-SCNC: 103 MEQ/L (ref 98–111)
CO2: 26 MEQ/L (ref 23–33)
CREAT SERPL-MCNC: 0.9 MG/DL (ref 0.4–1.2)
EOSINOPHIL # BLD: 0.2 %
EOSINOPHILS ABSOLUTE: 0 THOU/MM3 (ref 0–0.4)
ERYTHROCYTE [DISTWIDTH] IN BLOOD BY AUTOMATED COUNT: 12 % (ref 11.5–14.5)
ERYTHROCYTE [DISTWIDTH] IN BLOOD BY AUTOMATED COUNT: 41.4 FL (ref 35–45)
GFR SERPL CREATININE-BSD FRML MDRD: 83 ML/MIN/1.73M2
GLUCOSE BLD-MCNC: 108 MG/DL (ref 70–108)
HCT VFR BLD CALC: 43.3 % (ref 42–52)
HEMOGLOBIN: 14.2 GM/DL (ref 14–18)
IMMATURE GRANS (ABS): 0.17 THOU/MM3 (ref 0–0.07)
IMMATURE GRANULOCYTES: 2 %
LYMPHOCYTES # BLD: 9.6 %
LYMPHOCYTES ABSOLUTE: 0.8 THOU/MM3 (ref 1–4.8)
MCH RBC QN AUTO: 31 PG (ref 26–33)
MCHC RBC AUTO-ENTMCNC: 32.8 GM/DL (ref 32.2–35.5)
MCV RBC AUTO: 94.5 FL (ref 80–94)
MONOCYTES # BLD: 10.4 %
MONOCYTES ABSOLUTE: 0.9 THOU/MM3 (ref 0.4–1.3)
NUCLEATED RED BLOOD CELLS: 0 /100 WBC
PLATELET # BLD: 474 THOU/MM3 (ref 130–400)
PMV BLD AUTO: 9.2 FL (ref 9.4–12.4)
POTASSIUM REFLEX MAGNESIUM: 4.8 MEQ/L (ref 3.5–5.2)
RBC # BLD: 4.58 MILL/MM3 (ref 4.7–6.1)
SEG NEUTROPHILS: 77.5 %
SEGMENTED NEUTROPHILS ABSOLUTE COUNT: 6.7 THOU/MM3 (ref 1.8–7.7)
SODIUM BLD-SCNC: 138 MEQ/L (ref 135–145)
TOTAL PROTEIN: 5.8 G/DL (ref 6.1–8)
WBC # BLD: 8.7 THOU/MM3 (ref 4.8–10.8)

## 2020-09-26 PROCEDURE — 6360000002 HC RX W HCPCS: Performed by: INTERNAL MEDICINE

## 2020-09-26 PROCEDURE — 99239 HOSP IP/OBS DSCHRG MGMT >30: CPT | Performed by: HOSPITALIST

## 2020-09-26 PROCEDURE — 2580000003 HC RX 258: Performed by: INTERNAL MEDICINE

## 2020-09-26 PROCEDURE — 6370000000 HC RX 637 (ALT 250 FOR IP): Performed by: NURSE PRACTITIONER

## 2020-09-26 PROCEDURE — 85025 COMPLETE CBC W/AUTO DIFF WBC: CPT

## 2020-09-26 PROCEDURE — 6370000000 HC RX 637 (ALT 250 FOR IP): Performed by: HOSPITALIST

## 2020-09-26 PROCEDURE — 80053 COMPREHEN METABOLIC PANEL: CPT

## 2020-09-26 PROCEDURE — 36415 COLL VENOUS BLD VENIPUNCTURE: CPT

## 2020-09-26 PROCEDURE — 94760 N-INVAS EAR/PLS OXIMETRY 1: CPT

## 2020-09-26 PROCEDURE — 6370000000 HC RX 637 (ALT 250 FOR IP): Performed by: INTERNAL MEDICINE

## 2020-09-26 RX ORDER — ASPIRIN 81 MG/1
81 TABLET, CHEWABLE ORAL DAILY
Qty: 30 TABLET | Refills: 0 | Status: SHIPPED | OUTPATIENT
Start: 2020-09-26 | End: 2021-09-13

## 2020-09-26 RX ADMIN — ASPIRIN 81 MG: 81 TABLET, CHEWABLE ORAL at 09:58

## 2020-09-26 RX ADMIN — Medication 50 MG: at 09:58

## 2020-09-26 RX ADMIN — CETIRIZINE HYDROCHLORIDE 10 MG: 10 TABLET ORAL at 09:58

## 2020-09-26 RX ADMIN — Medication 1000 MG: at 09:58

## 2020-09-26 RX ADMIN — Medication 10 ML: at 09:59

## 2020-09-26 RX ADMIN — DEXAMETHASONE 6 MG: 4 TABLET ORAL at 09:58

## 2020-09-26 RX ADMIN — Medication 1000 UNITS: at 09:58

## 2020-09-26 RX ADMIN — ENOXAPARIN SODIUM 30 MG: 30 INJECTION SUBCUTANEOUS at 03:45

## 2020-09-26 ASSESSMENT — PAIN SCALES - GENERAL
PAINLEVEL_OUTOF10: 0
PAINLEVEL_OUTOF10: 0

## 2020-09-26 NOTE — PROGRESS NOTES
A home oxygen evaluation has been completed. [x]Patient is an inpatient. It is expected that the patient will be discharged within the next 48 hours. Qualified provider to write orders for possible sleep study or home oxygen prescription. Social service/care managers will arrange for home oxygen if ordered. If patient is active, arrange for Home Medical supplier to assess for Oxygen Conserving Device per pulse oximetry. []Patient is an outpatient. Results will be faxed to the ordering provider. Qualified provider to write orders for possible sleep study or home oxygen prescription. Patient was placed on room air for >60 minutes. SpO2 was 93 % on room air at rest. Patients SpO2 was 89% or above and did not qualify for home oxygen. Patient was walked for 6 minutes. SpO2 was 91 % during walking. Patients SpO2 was 89% or above and did not qualify for home oxygen. Note: For any SpO2 at 16% see policy and procedure for possible qualifications.

## 2020-09-26 NOTE — PLAN OF CARE
Problem: Discharge Planning:  Goal: Participates in care planning  Description: Participates in care planning  9/26/2020 0057 by Meme Matt RN  Outcome: Ongoing  Note: Pt verbalizes understanding of care plan. Pt contributes to goal settings. Problem: Activity Intolerance:  Goal: Ability to tolerate increased activity will improve  Description: Ability to tolerate increased activity will improve  9/26/2020 0057 by Meme Matt RN  Outcome: Ongoing  Note: Pt able to tolerate increased activity. Will continue to assess. Problem: Bowel Function - Altered:  Goal: Bowel elimination is within specified parameters  Description: Bowel elimination is within specified parameters  9/26/2020 0057 by Meme Matt RN  Outcome: Ongoing  Note: Bowel elimination WNL     Problem: Fluid Volume - Deficit:  Goal: Electrolytes within specified parameters  Description: Electrolytes within specified parameters  9/26/2020 0057 by Meme Matt RN  Outcome: Ongoing  Note: Pt electrolytes within normal limits on my shift. Will continue to monitor. Problem: Nutrition Deficit:  Goal: Ability to achieve adequate nutritional intake will improve  Description: Ability to achieve adequate nutritional intake will improve  9/26/2020 0057 by Meme Matt RN  Outcome: Ongoing  Note: Adequate nutrition     Problem: Skin Integrity - Impaired:  Goal: Will show no infection signs and symptoms  Description: Will show no infection signs and symptoms  9/26/2020 0057 by Meme Matt RN  Outcome: Ongoing  Note: Pt shows no new signs or symptoms of infection on my shift. Will continue to assess. Problem: Skin Integrity - Impaired:  Goal: Absence of new skin breakdown  Description: Absence of new skin breakdown  9/26/2020 0057 by Meme Matt RN  Outcome: Ongoing  Note: Absence of new skin integrity issues on my shift. Will continue to assess. Pt verbalizes understanding of care plan.  Pt contributes to goal settings.

## 2020-09-26 NOTE — PROGRESS NOTES
Discharge teaching and instructions for diagnosis/procedure of Covid completed with patient using teachback method. AVS reviewed. Printed prescriptions given to patient. Patient voiced understanding regarding prescriptions, follow up appointments, and care of self at home.  Discharged in a wheelchair to  home with support per family

## 2020-09-26 NOTE — DISCHARGE SUMMARY
FeNa c/w hypovolemic hypotonic hypoNa. Noted IVF stopped yesterday. Given mild hypona and that pt has improved PO intake plys IVF via IV meds and in context of COVID PNA, will hold off on IVF and will trend Na.   9/24: resolved. Na 136. Will monitor  9/26: Na 138     3. HX of severe dermatitis. Mycophenolate remains on hold at discharge. PCP/dermatologist to consider resuming med as necessary after pt completely recovered. 4. Chronic steroid use due to#3. Currently off CS. Managed as dicussed above.   5. Hx of HLD: on statin      Exam:     Vitals:  Vitals:    09/25/20 2315 09/26/20 0330 09/26/20 0600 09/26/20 0836   BP: 132/79 132/71  106/75   Pulse: 57 57  62   Resp: 17 18  22   Temp: 97.3 °F (36.3 °C) 97.5 °F (36.4 °C)  97.3 °F (36.3 °C)   TempSrc: Oral Oral  Oral   SpO2: 95% 94%  93%   Weight:   173 lb (78.5 kg)    Height:         Weight: Weight: 173 lb (78.5 kg)     24 hour intake/output:    Intake/Output Summary (Last 24 hours) at 9/26/2020 0906  Last data filed at 9/26/2020 0836  Gross per 24 hour   Intake --   Output 1700 ml   Net -1700 ml           General appearance: A&O x3, Not ill or toxic, in no apparent distress  HEENT:  NAFISA  EOM intact. Neck: Supple, with full range of motion. No jugular venous distention. Trachea midline. Respiratory:   NL A/E bilat with no adventitious sounds   Cardiovascular:  normal S1/S2 with no murmurs/gallops  Abdomen: Soft, non-tender, non-distended, no rigidity or peritoneal signs  Musculoskeletal: NL symmetrical A/PROM bilat U/L extremities   Skin: No rashes. No edema  Neurologic:  CN II-XII intact. NL symmetrical reflexes. NL gait and stance. NL Cerebellar exam. Power 5/5 all muscle groups U/L extremities. Toes downgoing  Capillary Refill: Brisk,< 3 seconds   Peripheral Pulses: +2 palpable, equal bilaterally              Labs:  For convenience and continuity at follow-up the following most recent labs are provided:      CBC:    Lab Results   Component Value Date    WBC RN in room, patient was updated about the treatment plan, all the questions and concerns were addressed. Alarming signs and symptoms to return to ED were explained in length. Signed: Thank you Haleigh Sanches MD for the opportunity to be involved in this patient's care.     Electronically signed by Lake Alonso MD on 9/26/2020 at 9:06 AM

## 2020-09-28 ENCOUNTER — CARE COORDINATION (OUTPATIENT)
Dept: CASE MANAGEMENT | Age: 70
End: 2020-09-28

## 2020-09-28 NOTE — CARE COORDINATION
Reina Giles and agrees to enroll no    Patient denies SOB, cough, and fever. He states he is deep breathing and taking walks around his house. He has all his medication. He has not made an appt with PCP yet but will. Denies concerns or issues at this time. Plan for follow-up call in 5-7 days based on severity of symptoms and risk factors.

## 2020-10-02 ENCOUNTER — HOSPITAL ENCOUNTER (OUTPATIENT)
Dept: GENERAL RADIOLOGY | Age: 70
Discharge: HOME | End: 2020-10-02
Payer: MEDICARE

## 2020-10-02 ENCOUNTER — HOSPITAL ENCOUNTER (OUTPATIENT)
Age: 70
Discharge: HOME | End: 2020-10-02
Payer: MEDICARE

## 2020-10-02 PROCEDURE — 71046 X-RAY EXAM CHEST 2 VIEWS: CPT

## 2020-10-06 ENCOUNTER — CARE COORDINATION (OUTPATIENT)
Dept: CASE MANAGEMENT | Age: 70
End: 2020-10-06

## 2020-10-06 NOTE — CARE COORDINATION
Felisa [facility] Transitions Follow Up Call    10/6/2020    Patient: Leroy Heads  Patient : 1950   MRN: [id number]  Reason for Admission: covid +  Discharge Date: 20 RARS: Readmission Risk Score: 12        Needs to be reviewed by the provider   Additional needs identified to be addressed with provider No  none  Discussed COVID-19 related testing which was available at this time. Test results were positive. Patient informed of results, if available? Yes         Method of communication with provider : none    Care Transition Nurse (CTN) contacted the patient by telephone to follow up. Verified name and  with patient as identifiers. Addressed changes since last contact: patient denies SOB, cough, chest pressure and fever. he states he \"feels really good\" he is contniueing to walk and dep breathing. Discharged needs reviewed: none  Follow up appointment completed? Yes    Advance Care Planning:   Does patient have an Advance Directive:  reviewed and current. CTN reviewed discharge instructions, medical action plan and red flags with patient and discussed any barriers to care and/or understanding of plan of care after discharge. Discussed appropriate site of care based on symptoms and resources available to patient including: PCP. The patient agrees to contact the PCP office for questions related to their healthcare. Patients top risk factors for readmission: medical condition  Interventions to address risk factors: Obtained and reviewed discharge summary and/or continuity of care documents    CTN provided contact information for future needs. Care Transitions Subsequent and Final Call    Subsequent and Final Calls  Care Transitions Interventions  Other Interventions: Follow Up  No future appointments.     Ashwini Reagan RN

## 2020-10-09 ENCOUNTER — HOSPITAL ENCOUNTER (OUTPATIENT)
Age: 70
Discharge: HOME | End: 2020-10-09
Payer: MEDICARE

## 2020-10-09 ENCOUNTER — HOSPITAL ENCOUNTER (OUTPATIENT)
Dept: GENERAL RADIOLOGY | Age: 70
Discharge: HOME | End: 2020-10-09
Payer: MEDICARE

## 2020-10-09 PROCEDURE — 71046 X-RAY EXAM CHEST 2 VIEWS: CPT

## 2020-12-09 ENCOUNTER — HOSPITAL ENCOUNTER (OUTPATIENT)
Age: 70
Discharge: HOME | End: 2020-12-09
Payer: MEDICARE

## 2020-12-09 LAB
ALBUMIN SERPL-MCNC: 4.3 G/DL (ref 3.5–5.1)
ALP BLD-CCNC: 74 U/L (ref 38–126)
ALT SERPL-CCNC: 29 U/L (ref 11–66)
ANION GAP SERPL CALCULATED.3IONS-SCNC: 12 MEQ/L (ref 8–16)
AST SERPL-CCNC: 24 U/L (ref 5–40)
BASOPHILS # BLD: 0.6 %
BASOPHILS ABSOLUTE: 0 THOU/MM3 (ref 0–0.1)
BILIRUB SERPL-MCNC: 0.6 MG/DL (ref 0.3–1.2)
BUN BLDV-MCNC: 14 MG/DL (ref 7–22)
CALCIUM SERPL-MCNC: 9.6 MG/DL (ref 8.5–10.5)
CHLORIDE BLD-SCNC: 103 MEQ/L (ref 98–111)
CO2: 24 MEQ/L (ref 23–33)
CREAT SERPL-MCNC: 0.8 MG/DL (ref 0.4–1.2)
EOSINOPHIL # BLD: 2.9 %
EOSINOPHILS ABSOLUTE: 0.2 THOU/MM3 (ref 0–0.4)
ERYTHROCYTE [DISTWIDTH] IN BLOOD BY AUTOMATED COUNT: 13.5 % (ref 11.5–14.5)
ERYTHROCYTE [DISTWIDTH] IN BLOOD BY AUTOMATED COUNT: 49.1 FL (ref 35–45)
GFR SERPL CREATININE-BSD FRML MDRD: > 90 ML/MIN/1.73M2
GLUCOSE BLD-MCNC: 102 MG/DL (ref 70–108)
HCT VFR BLD CALC: 44.7 % (ref 42–52)
HEMOGLOBIN: 14.7 GM/DL (ref 14–18)
IMMATURE GRANS (ABS): 0.05 THOU/MM3 (ref 0–0.07)
IMMATURE GRANULOCYTES: 0.6 %
LYMPHOCYTES # BLD: 14.2 %
LYMPHOCYTES ABSOLUTE: 1.1 THOU/MM3 (ref 1–4.8)
MCH RBC QN AUTO: 32.3 PG (ref 26–33)
MCHC RBC AUTO-ENTMCNC: 32.9 GM/DL (ref 32.2–35.5)
MCV RBC AUTO: 98.2 FL (ref 80–94)
MONOCYTES # BLD: 9.9 %
MONOCYTES ABSOLUTE: 0.8 THOU/MM3 (ref 0.4–1.3)
NUCLEATED RED BLOOD CELLS: 0 /100 WBC
PLATELET # BLD: 331 THOU/MM3 (ref 130–400)
PMV BLD AUTO: 9.8 FL (ref 9.4–12.4)
POTASSIUM SERPL-SCNC: 3.8 MEQ/L (ref 3.5–5.2)
RBC # BLD: 4.55 MILL/MM3 (ref 4.7–6.1)
SEG NEUTROPHILS: 71.8 %
SEGMENTED NEUTROPHILS ABSOLUTE COUNT: 5.7 THOU/MM3 (ref 1.8–7.7)
SODIUM BLD-SCNC: 139 MEQ/L (ref 135–145)
TOTAL PROTEIN: 6.6 G/DL (ref 6.1–8)
WBC # BLD: 8 THOU/MM3 (ref 4.8–10.8)

## 2020-12-09 PROCEDURE — 85025 COMPLETE CBC W/AUTO DIFF WBC: CPT

## 2020-12-09 PROCEDURE — 36415 COLL VENOUS BLD VENIPUNCTURE: CPT

## 2020-12-09 PROCEDURE — 80053 COMPREHEN METABOLIC PANEL: CPT

## 2020-12-16 ENCOUNTER — HOSPITAL ENCOUNTER (OUTPATIENT)
Dept: WOMENS IMAGING | Age: 70
Discharge: HOME | End: 2020-12-16
Payer: MEDICARE

## 2020-12-16 PROCEDURE — 77080 DXA BONE DENSITY AXIAL: CPT

## 2020-12-30 ENCOUNTER — HOSPITAL ENCOUNTER (OUTPATIENT)
Dept: OCCUPATIONAL THERAPY | Age: 70
Setting detail: THERAPIES SERIES
Discharge: HOME | End: 2020-12-30
Payer: MEDICARE

## 2020-12-30 PROCEDURE — 97165 OT EVAL LOW COMPLEX 30 MIN: CPT

## 2020-12-30 PROCEDURE — 97110 THERAPEUTIC EXERCISES: CPT

## 2020-12-30 NOTE — FLOWSHEET NOTE
** PLEASE SIGN, DATE AND TIME CERTIFICATION BELOW AND RETURN TO [facility] [place] OUTPATIENT REHABILITATION (FAX #: [phone]). ATTEST/CO-SIGN IF ACCESSING VIA INKera. THANK YOU.**    I certify that I have examined the patient below and determined that Physical Medicine and Rehabilitation service is necessary and that I approve the established plan of care for up to 90 days or as specifically noted. Attestation, signature or co-signature of physician indicates approval of certification requirements.    ________________________ ____________ __________  Physician Signature   Date   Time   [address]  [x] EVALUATION  [] DAILY NOTE (LAND) [] DAILY NOTE (AQUATIC ) [] PROGRESS NOTE [] DISCHARGE NOTE    [] [address] [facility]   [] [address]    [x] [address] [facility]   [] Luis Dance    Date: 2020  Patient Name:  Reyes Gonzalez  : [birth year]  MRN: [id number]  CSN: [id number]    Referring Practitioner Stanley Park MD   Diagnosis LEFT FROZEN SHOULDER    Treatment Diagnosis Decreased ROM left shoulder   Date of Evaluation 20      Functional Outcome Measure Used UEFS   Functional Outcome Score 69/80 (20)       Insurance: Primary: Payor: Mervin Mace /  /  / ,   Secondary: [facility]   Authorization Information: No ionto, no hot/cold packs   Visit # 1, 1/10 for progress note   Visits Allowed: Unlimited based on medical necessity   Recertification Date: 57   Physician Follow-Up: Unsure of follow up appointment with physician   Physician Orders: Aggressive OT eval and treat - ROM 5 x week x 3 weeks, then 3 x week x 3 weeks   Pertinent History: Patient reports that his left shoulder started bothering him about 18 months ago with certain activities. States that he had the most pain with archery activities. States that he had an injection in 2019 and he did well for about a year.  States that he went back [employer] nvt account. Enter [id number] in the [facility] box to learn more about \"Iron-Rich Diet: Care Instructions. \"     If you do not have an account, please click on the \"Sign Up Now\" link. Current as of: May 12, 2017  Content Version: 11.7  © 9373-1970 [employer]. Care instructions adapted under license by [facility] ([facility]). If you have questions about a medical condition or this instruction, always ask your healthcare professional. [address] any warranty or liability for your use of this information. to the physician this fall after he climbed a ladder and had some pain in his left shoulder. It was determined to have an injection with ultrasound guidance - had this done on 12/29/20. Patient states that he had COVID in September and was hospitalized. States that he had high level steroid treatments as inpatient and then had oral steroids for a couple weeks after discharge from hospital. States that then he an autoimmune flare up (had similar episode in 2012 and has been taking celcept). States that he is now taking prednisone and increased amount of celcept. States that he is not having any pain in the left shoulder due to having quite a bit of steroids in the past few months. Comorbidities include osteopenia that could influence rehab process. SUBJECTIVE: Patient cooperative with evaluation. States that he doesn't know if he will be needing to have as much therapy as ordered due to his not having any pain. States that he did have some pain in left shoulder when he was trying to pull back his bow early this fall (pre-covid). States that his niece did give him some exercises at that time and he does report that he had some difficulty with one of the exercises - patient unable to recall what the exercise was. States that he has noticed some difficulty with memory since having covid. Social/Functional History:  Medications and Allergies have been reviewed and are listed on the [address] lives with spouse in a single story home with stairs and no handrail to enter. .    Task Prior Level of Function  (current level of function addressed below)   ADLs  Independent   Ambulation Independent   Transfers Independent   Hobbies Artwork, hunting, volunteer at [employer]    Work Retired     OBJECTIVE:  Hand Dominance left handed   Palpation    Observation    Posture Good posture noted   Edema    Special Tests        ADL's Patient relates that he does have some difficulty with donning jacket and tucking in shirt. States that he woke up 1 x last night due to injection-related pain. States that he is able to use his left arm to use [employer] and drive-thru. States that he did have some difficulty pulling back bow this fall, but relates that this might have been more due to his recovery from Covid. Bed Mobility     Transfers    Balance        Sensation Left Upper Extremity: Intact. Coordination WFL           LEFT UPPER EXTREMITY  RANGE OF MOTION    AROM PROM COMMENTS         Shoulder Flexion 145 170    Shoulder Extension 68     Shoulder Abduction 146 150    Shoulder Adduction      Shoulder External Rotation 82 90    Shoulder Internal Rotation Can get left thumb 10.5\" above waistband behind back 90          TREATMENT   Precautions: osteopenia   Pain:  3/10 in left shoulder; 6/10 earlier today in left shoulder    X in shaded column indicates Activity Completed Today   Modalities Parameters/  Location  Notes/Comments                     Manual Therapy Time/  Technique  Notes/Comments   sidelying left scapular mobilizations  x Nice mobility present               Exercises   Sets/  Sec Reps  Notes/Comments   Supine PROM to left shoulder in all planes   x    Supine dowel for shoulder flexion 1 10 x    Supine dowel for chest press 1 10 x    Scapular retraction 1 10 x    Backward shoulder circles 1 10 x    [place] circles with left UE 1 10 in each direction            Activities Time    Notes/Comments                       Specific Interventions Next Treatment: ROM    Activity/Treatment Tolerance:  [x]  Patient tolerated treatment well  []  Patient limited by fatigue  []  Patient limited by pain   []  Patient limited by other medical complications  []  Other:     Assessment: Patient meets criteria for low complexity evaluation based on documented evaluation findings.  Patient requires skilled therapy services to assist in increasing ROM and decreasing pain in his left shoulder to allow patient to complete his normal daily and leisure activities without difficulty. Areas for Improvement: impaired ROM, pain and impaired ADL  Prognosis: good    GOALS:  Patient Goal: To have more motion and strength in my left arm. Short Term Goals:  Time Frame: 4 weeks  1. Be independent with HEP as instructed to increase his ability to eventually pull back bow with left hand. 2. Increase active left shoulder flexion to 155, abduction to 155, and ER to 87 to increase his ability to reach overhead for normal daily tasks. 3. Report pain going no higher than 3/10 in left shoulder at any time to allow patient to use left arm in all of his normal daily tasks. 4. Be able to don jacket and tuck in shirt without any pain in left UE. Long Term Goals:  Time Frame: 12 weeks  1. Be able to use left arm for all normal daily tasks without difficulty. 2. Be able to turn steering wheel completely with only left UE without difficulty. 3. Be able to use left arm to retrieve item from upper cupboard without compensation or difficulty. Patient Education:   [x]  HEP/Education Completed: Plan of Care, Goals, HEP - scapular retraction, backward shoulder circles, supine dowel for shoulder flexion and chest press, rainbow circles with left UE     []  No new Education completed  []  Reviewed Prior HEP      [x]  Patient verbalized and/or demonstrated understanding of education provided. []  Patient unable to verbalize and/or demonstrate understanding of education provided. Will continue education. [x]  Barriers to learning: slight memory issues    PLAN:  Treatment Recommendations: Strengthening, Range of Motion, Manual Therapy - Soft Tissue Mobilization, Pain Management, Home Exercise Program, Patient Education and Self-Care Education and Training    [x]  Plan of care initiated. Plan to see patient 5 times per week for 3 weeks and then 3 x week x 9 weeks to address the treatment planned outlined above.   [] Continue with current plan of care  []  Modify plan of care as follows:    []  Hold pending physician visit  []  Discharge    Time In 1345   Time Out 1440       Timed Code Minutes: Minutes Units   ADL (07801)     Aquatics (96938)     Manual Therapy (62336)     Neuro (20705)     Th. Activities (17728)     Th. Exercise (61924) 25 2   Wheelchair Mgmt (39503)     Cognitive Function (1st 15 min - 09132)     Cognitive Function (per sub.  15 min - 40208)     Ultrasound (02231)     Ionto (29780)     Manual E-Stim (91974)          TOTAL TREATMENT TIME: 55 minutes       Geraldine Mayo OTR/L #[id number]

## 2020-12-31 ENCOUNTER — HOSPITAL ENCOUNTER (OUTPATIENT)
Dept: OCCUPATIONAL THERAPY | Age: 70
Setting detail: THERAPIES SERIES
Discharge: HOME | End: 2020-12-31
Payer: MEDICARE

## 2020-12-31 PROCEDURE — 97110 THERAPEUTIC EXERCISES: CPT

## 2020-12-31 NOTE — PROGRESS NOTES
having any pain in the left shoulder due to having quite a bit of steroids in the past few months. Comorbidities include osteopenia that could influence rehab process. SUBJECTIVE: Reports that his shoulder is a little tighter today. Pain is not bad. Reports that he did have an MRI prior to the first injection. TREATMENT   Precautions: osteopenia   Pain:  2/10 in left shoulder in the morning, no pain right now    X in shaded column indicates Activity Completed Today   Modalities Parameters/  Location  Notes/Comments                     Manual Therapy Time/  Technique  Notes/Comments   sidelying left scapular mobilizations   Nice mobility present               Exercises   Sets/  Sec Reps  Notes/Comments   Supine PROM to left shoulder in all planes   x    Supine dowel for shoulder flexion 1 10 x    Supine dowel for chest press 1 10     Scapular retraction 1 10 x    Backward shoulder circles 1 10 x    [place] circles with left UE 1 10 in each direction     Hands clasped or dowel IR up the back 1 10 with a 5 second hold x    Activities Time    Notes/Comments                       Specific Interventions Next Treatment: ROM    Activity/Treatment Tolerance:  [x]  Patient tolerated treatment well  []  Patient limited by fatigue  []  Patient limited by pain   []  Patient limited by other medical complications  []  Other:     Assessment: Patient is progressing toward goals. No questions with HEP. Areas for Improvement: impaired ROM, pain and impaired ADL  Prognosis: good    GOALS:  Patient Goal: To have more motion and strength in my left arm. Short Term Goals:  Time Frame: 4 weeks  1. Be independent with HEP as instructed to increase his ability to eventually pull back bow with left hand. 2. Increase active left shoulder flexion to 155, abduction to 155, and ER to 87 to increase his ability to reach overhead for normal daily tasks.   3. Report pain going no higher than 3/10 in left shoulder at any time to allow patient to use left arm in all of his normal daily tasks. 4. Be able to don jacket and tuck in shirt without any pain in left UE. Long Term Goals:  Time Frame: 12 weeks  1. Be able to use left arm for all normal daily tasks without difficulty. 2. Be able to turn steering wheel completely with only left UE without difficulty. 3. Be able to use left arm to retrieve item from upper cupboard without compensation or difficulty. Patient Education:   [x]  HEP/Education Completed: Plan of Care, Goals, HEP - scapular retraction, backward shoulder circles, supine dowel for shoulder flexion and chest press, rainbow circles with left UE     []  No new Education completed  []  Reviewed Prior HEP      [x]  Patient verbalized and/or demonstrated understanding of education provided. []  Patient unable to verbalize and/or demonstrate understanding of education provided. Will continue education. [x]  Barriers to learning: slight memory issues    PLAN:  Treatment Recommendations: Strengthening, Range of Motion, Manual Therapy - Soft Tissue Mobilization, Pain Management, Home Exercise Program, Patient Education and Self-Care Education and Training    [x]  Plan of care initiated. Plan to see patient 5 times per week for 3 weeks and then 3 x week x 9 weeks to address the treatment planned outlined above. []  Continue with current plan of care  []  Modify plan of care as follows:    []  Hold pending physician visit  []  Discharge    Time In 1000   Time Out 1030       Timed Code Minutes: Minutes Units   ADL (71130)     Aquatics (01380)     Manual Therapy (84693)     Neuro (56704)     Th. Activities (60727)     Th. Exercise (83506) 30 2   Wheelchair Mgmt (67428)     Cognitive Function (1st 15 min - 96272)     Cognitive Function (per sub.  15 min - 99149)     Ultrasound (20214)     Ionto (41749)     Manual E-Stim (68848)          TOTAL TREATMENT TIME: 30 minutes       Richard Chen, OTR/L [id number]

## 2021-01-04 ENCOUNTER — HOSPITAL ENCOUNTER (OUTPATIENT)
Dept: OCCUPATIONAL THERAPY | Age: 71
Setting detail: THERAPIES SERIES
Discharge: HOME | End: 2021-01-04
Payer: MEDICARE

## 2021-01-04 PROCEDURE — 97110 THERAPEUTIC EXERCISES: CPT

## 2021-01-04 NOTE — PROGRESS NOTES
[address]  [] EVALUATION  [x] DAILY NOTE (LAND) [] DAILY NOTE (AQUATIC ) [] PROGRESS NOTE [] DISCHARGE NOTE    [] [facility]   [] [address]    [x] [facility]   [] Easton Vasquez    Date: 2021  Patient Name:  Elsi Quevedo  : [birth year]  MRN: [id number]  CSN: [id number]    Referring Practitioner Liliana Dubon MD   Diagnosis LEFT FROZEN SHOULDER    Treatment Diagnosis Decreased ROM left shoulder   Date of Evaluation 20      Functional Outcome Measure Used UEFS   Functional Outcome Score 69/80 (20)       Insurance: Primary: Payor: Ochoa Moncada /  /  / ,   Secondary: [place] [employer] Information: No ionto, no hot/cold packs   Visit # 3, 3/10 for progress note   Visits Allowed: Unlimited based on medical necessity   Recertification Date: 3/61/15   Physician Follow-Up: 21   Physician Orders: Aggressive OT eval and treat - ROM 5 x week x 3 weeks, then 3 x week x 3 weeks   Pertinent History: Patient reports that his left shoulder started bothering him about 18 months ago with certain activities. States that he had the most pain with archery activities. States that he had an injection in 2019 and he did well for about a year. States that he went back to the physician this fall after he climbed a ladder and had some pain in his left shoulder. It was determined to have an injection with ultrasound guidance - had this done on 20. Patient states that he had COVID in September and was hospitalized. States that he had high level steroid treatments as inpatient and then had oral steroids for a couple weeks after discharge from hospital. States that then he an autoimmune flare up (had similar episode in  and has been taking celcept). States that he is now taking prednisone and increased amount of celcept.  States that he is not having any pain in the left shoulder due to having quite a bit of steroids in the past few months. Comorbidities include osteopenia that could influence rehab process. SUBJECTIVE: States that he thinks that the discomfort he had last week was related to the injection that he had. States that he is having no difficulty with any of the exercises. States that he will be done with prednisone by the end of the week (taking 5 mg every other day as of now). TREATMENT   Precautions: osteopenia   Pain:  Reports no pain in left shoulder    X in shaded column indicates Activity Completed Today   Modalities Parameters/  Location  Notes/Comments                     Manual Therapy Time/  Technique  Notes/Comments   sidelying left scapular mobilizations  x    STM to left upper trapezius and rhomboid regions  x          Exercises   Sets/  Sec Reps  Notes/Comments   Supine PROM to left shoulder in all planes   x    Supine dowel for shoulder flexion 1 10     Supine dowel for chest press 1 10     Scapular retraction 1 15 x    Backward shoulder circles 1 15 x    [place] circles with left UE 1 10 in each direction     biodex at 90 speed x 3 minutes forward and 3 minutes backward   x    Roll-over stretch onto left side at 3 different degrees of abduction  3 each x Reported stretch at left posterior capsule          Hands clasped or dowel IR up the back 1 10 with a 5 second hold     Activities Time    Notes/Comments                       Specific Interventions Next Treatment: ROM    Activity/Treatment Tolerance:  [x]  Patient tolerated treatment well  []  Patient limited by fatigue  []  Patient limited by pain   []  Patient limited by other medical complications  []  Other:     Assessment: Patient is progressing toward goals nicely. Patient did c/o pain at end ROM this date and c/o pain in posterior capsule region. GOALS:  Patient Goal: To have more motion and strength in my left arm. Short Term Goals:  Time Frame: 4 weeks  1.  Be independent with HEP as instructed to increase his ability to eventually pull back bow with left hand. 2. Increase active left shoulder flexion to 155, abduction to 155, and ER to 87 to increase his ability to reach overhead for normal daily tasks. 3. Report pain going no higher than 3/10 in left shoulder at any time to allow patient to use left arm in all of his normal daily tasks. 4. Be able to don jacket and tuck in shirt without any pain in left UE. Long Term Goals:  Time Frame: 12 weeks  1. Be able to use left arm for all normal daily tasks without difficulty. 2. Be able to turn steering wheel completely with only left UE without difficulty. 3. Be able to use left arm to retrieve item from upper cupboard without compensation or difficulty. Patient Education:   [x]  HEP/Education Completed:  Rollover stretch for left shoulder posterior capsule stretch     []  No new Education completed  []  Reviewed Prior HEP      [x]  Patient verbalized and/or demonstrated understanding of education provided. []  Patient unable to verbalize and/or demonstrate understanding of education provided. Will continue education. [x]  Barriers to learning: slight memory issues    PLAN:    []  Plan of care initiated. Plan to see patient 5 times per week for 3 weeks and then 3 x week x 9 weeks to address the treatment planned outlined above. [x]  Continue with current plan of care  []  Modify plan of care as follows:    []  Hold pending physician visit  []  Discharge    Time In 1605   Time Out 1640       Timed Code Minutes: Minutes Units   ADL (26978)     Aquatics (65555)     Manual Therapy (44730)     Neuro (45250)     Th. Activities (50571)     Th. Exercise (75779) 35 2   Wheelchair Mgmt (00894)     Cognitive Function (1st 15 min - 11830)     Cognitive Function (per sub.  15 min - 73291)     Ultrasound (11775)     Ionto (78392)     Manual E-Stim (47069)          TOTAL TREATMENT TIME: 35 minutes     Raji Silverman OTR/ANTONI #[id number]

## 2021-01-05 ENCOUNTER — HOSPITAL ENCOUNTER (OUTPATIENT)
Dept: OCCUPATIONAL THERAPY | Age: 71
Setting detail: THERAPIES SERIES
Discharge: HOME | End: 2021-01-05
Payer: MEDICARE

## 2021-01-05 PROCEDURE — 97110 THERAPEUTIC EXERCISES: CPT

## 2021-01-05 NOTE — PROGRESS NOTES
having quite a bit of steroids in the past few months. Comorbidities include osteopenia that could influence rehab process. SUBJECTIVE: States that he didn't have any pain last evening after the new stretches to left shoulder. TREATMENT   Precautions: osteopenia   Pain:  Reports no pain in left shoulder    X in shaded column indicates Activity Completed Today   Modalities Parameters/  Location  Notes/Comments                     Manual Therapy Time/  Technique  Notes/Comments   sidelying left scapular mobilizations      STM to left upper trapezius and rhomboid regions  x          Exercises   Sets/  Sec Reps  Notes/Comments   Supine PROM to left shoulder in all planes   x Tightness at end ROM   Supine dowel for shoulder flexion 1 10     Supine dowel for chest press 1 10     Scapular retraction 1 15 x    Backward shoulder circles 1 15 x    [place] circles with left UE 1 10 in each direction x    biodex at 90 speed x 3 minutes forward and 3 minutes backward       Roll-over stretch onto left side at 3 different degrees of abduction  3 each  Reported stretch at left posterior capsule   Pulleys for shoulder flexion 1 15 x    Hands clasped or dowel IR up the back 1 10 with a 5 second hold     Activities Time    Notes/Comments                       Specific Interventions Next Treatment: ROM    Activity/Treatment Tolerance:  [x]  Patient tolerated treatment well  []  Patient limited by fatigue  []  Patient limited by pain   []  Patient limited by other medical complications  []  Other:     Assessment: Patient is progressing toward goals nicely. Pain complaints did continue today at end of available ROM of left shoulder flexion. GOALS:  Patient Goal: To have more motion and strength in my left arm. Short Term Goals:  Time Frame: 4 weeks  1. Be independent with HEP as instructed to increase his ability to eventually pull back bow with left hand.   2. Increase active left shoulder flexion to 155, abduction to 155, and ER to 87 to increase his ability to reach overhead for normal daily tasks. 3. Report pain going no higher than 3/10 in left shoulder at any time to allow patient to use left arm in all of his normal daily tasks. 4. Be able to don jacket and tuck in shirt without any pain in left UE. Long Term Goals:  Time Frame: 12 weeks  1. Be able to use left arm for all normal daily tasks without difficulty. 2. Be able to turn steering wheel completely with only left UE without difficulty. 3. Be able to use left arm to retrieve item from upper cupboard without compensation or difficulty. Patient Education:   []  HEP/Education Completed:       []  No new Education completed  [x]  Reviewed Prior HEP      [x]  Patient verbalized and/or demonstrated understanding of education provided. []  Patient unable to verbalize and/or demonstrate understanding of education provided. Will continue education. [x]  Barriers to learning: slight memory issues    PLAN:    []  Plan of care initiated. Plan to see patient 5 times per week for 3 weeks and then 3 x week x 9 weeks to address the treatment planned outlined above. [x]  Continue with current plan of care  []  Modify plan of care as follows:    []  Hold pending physician visit  []  Discharge    Time In 1015   Time Out 1045       Timed Code Minutes: Minutes Units   ADL (49858)     Aquatics (92799)     Manual Therapy (48801)     Neuro (99996)     Th. Activities (30216)     Th. Exercise (80196) 30 2   Wheelchair Mgmt (01927)     Cognitive Function (1st 15 min - 82499)     Cognitive Function (per sub.  15 min - 54505)     Ultrasound (32839)     Ionto (86133)     Manual E-Stim (20617)          TOTAL TREATMENT TIME: 30 minutes     Saintclair Abbott, OTR/[facility] #[id number]

## 2021-01-06 ENCOUNTER — HOSPITAL ENCOUNTER (OUTPATIENT)
Dept: OCCUPATIONAL THERAPY | Age: 71
Setting detail: THERAPIES SERIES
Discharge: HOME | End: 2021-01-06
Payer: MEDICARE

## 2021-01-06 PROCEDURE — 97110 THERAPEUTIC EXERCISES: CPT

## 2021-01-06 NOTE — PROGRESS NOTES
[address]  [] EVALUATION  [x] DAILY NOTE (LAND) [] DAILY NOTE (AQUATIC ) [] PROGRESS NOTE [] DISCHARGE NOTE    [] [facility]   [] [address]    [x] [facility]   [] Dtotie Jade    Date: 2021  Patient Name:  Nina Evans  :   MRN: [id number]  CSN: [id number]    Referring Practitioner Keesha Dorsey MD   Diagnosis LEFT FROZEN SHOULDER    Treatment Diagnosis Decreased ROM left shoulder   Date of Evaluation 20      Functional Outcome Measure Used UEFS   Functional Outcome Score 69/80 (20)       Insurance: Primary: Payor: Mirian Ruiz /  /  / ,   Secondary: Mariana Pérez Information: No ionto, no hot/cold packs   Visit # 5, 5/10 for progress note   Visits Allowed: Unlimited based on medical necessity   Recertification Date:    Physician Follow-Up: 21   Physician Orders: Aggressive OT eval and treat - ROM 5 x week x 3 weeks, then 3 x week x 3 weeks   Pertinent History: Patient reports that his left shoulder started bothering him about 18 months ago with certain activities. States that he had the most pain with archery activities. States that he had an injection in 2019 and he did well for about a year. States that he went back to the physician this fall after he climbed a ladder and had some pain in his left shoulder. It was determined to have an injection with ultrasound guidance - had this done on 20. Patient states that he had COVID in September and was hospitalized. States that he had high level steroid treatments as inpatient and then had oral steroids for a couple weeks after discharge from hospital. States that then he an autoimmune flare up (had similar episode in  and has been taking celcept). States that he is now taking prednisone and increased amount of celcept.  States that he is not having any pain in the left shoulder due to having quite a bit of steroids in the past few months. Comorbidities include osteopenia that could influence rehab process. SUBJECTIVE: States that he had no residual pain after last therapy session. Asked questions regarding HEP. TREATMENT   Precautions: osteopenia   Pain:  Reports no pain in left shoulder; reports pain goes to 2-3/10 at its highest with HEP stretches    X in shaded column indicates Activity Completed Today   Modalities Parameters/  Location  Notes/Comments                     Manual Therapy Time/  Technique  Notes/Comments   sidelying left scapular mobilizations      STM to left upper trapezius and rhomboid regions            Exercises   Sets/  Sec Reps  Notes/Comments   Supine PROM to left shoulder in all planes   x Tightness at end ROM   Supine dowel for shoulder flexion 1 10     Supine dowel for chest press 1 10     Scapular retraction 1 15     Backward shoulder circles 1 15     [place] circles with left UE 1 10 in each direction x    biodex at 90 speed x 3 minutes forward and 3 minutes backward   x Raised handles to increase ROM   Roll-over stretch onto left side at 3 different degrees of abduction  3 each  Reported stretch at left posterior capsule   Pulleys for shoulder flexion 1 15     Standing sleeper stretch for left shoulder IR 1 7 x 10 second hold          Hands clasped or dowel IR up the back 1 10 with a 5 second hold     Activities Time    Notes/Comments                       Specific Interventions Next Treatment: ROM    Activity/Treatment Tolerance:  [x]  Patient tolerated treatment well  []  Patient limited by fatigue  []  Patient limited by pain   []  Patient limited by other medical complications  []  Other:     Assessment: Patient is progressing toward goals nicely. Patient continues to have tightness in posterior capsule, but is improving. GOALS:  Patient Goal: To have more motion and strength in my left arm. Short Term Goals:  Time Frame: 4 weeks  1.  Be independent with HEP as instructed to increase his ability to eventually pull back bow with left hand. 2. Increase active left shoulder flexion to 155, abduction to 155, and ER to 87 to increase his ability to reach overhead for normal daily tasks. 3. Report pain going no higher than 3/10 in left shoulder at any time to allow patient to use left arm in all of his normal daily tasks. 4. Be able to don jacket and tuck in shirt without any pain in left UE. Long Term Goals:  Time Frame: 12 weeks  1. Be able to use left arm for all normal daily tasks without difficulty. 2. Be able to turn steering wheel completely with only left UE without difficulty. 3. Be able to use left arm to retrieve item from upper cupboard without compensation or difficulty. Patient Education:    [x]  HEP/Education Completed:  Standing sleeper stretch  []  No new Education completed  []  Reviewed Prior HEP      [x]  Patient verbalized and/or demonstrated understanding of education provided. []  Patient unable to verbalize and/or demonstrate understanding of education provided. Will continue education. [x]  Barriers to learning: slight memory issues    PLAN:    []  Plan of care initiated. Plan to see patient 5 times per week for 3 weeks and then 3 x week x 9 weeks to address the treatment planned outlined above. [x]  Continue with current plan of care  []  Modify plan of care as follows:    []  Hold pending physician visit  []  Discharge    Time In 1550   Time Out 1625       Timed Code Minutes: Minutes Units   ADL (54749)     Aquatics (75349)     Manual Therapy (31942)     Neuro (80817)     Th. Activities (38816)     Th. Exercise (15809) 35 2   Wheelchair Mgmt (48357)     Cognitive Function (1st 15 min - 22902)     Cognitive Function (per sub.  15 min - 08333)     Ultrasound (78137)     Ionto (11136)     Manual E-Stim (60375)          TOTAL TREATMENT TIME: 35 minutes     BERNADETTE Mckeon/[facility] #[id number]

## 2021-01-07 ENCOUNTER — HOSPITAL ENCOUNTER (OUTPATIENT)
Dept: OCCUPATIONAL THERAPY | Age: 71
Setting detail: THERAPIES SERIES
Discharge: HOME | End: 2021-01-07
Payer: MEDICARE

## 2021-01-07 PROCEDURE — 97110 THERAPEUTIC EXERCISES: CPT

## 2021-01-07 NOTE — PROGRESS NOTES
[address]  [] EVALUATION  [x] DAILY NOTE (LAND) [] DAILY NOTE (AQUATIC ) [] PROGRESS NOTE [] DISCHARGE NOTE    [] [address] [facility]   [x] [address]    [] [facility]   [] Leeanne Reyesmery    Date: 2021  Patient Name:  Jacklyn Hatchet  :   MRN: [id number]  CSN: [id number]    Referring Practitioner Dana Milian MD   Diagnosis LEFT FROZEN SHOULDER    Treatment Diagnosis Decreased ROM left shoulder   Date of Evaluation 20      Functional Outcome Measure Used UEFS   Functional Outcome Score 69/80 (20)       Insurance: Primary: Payor: Medardo Alejandra /  /  / ,   Secondary: Justine Álvarez Information: No ionto, no hot/cold packs   Visit # 6, 6/10 for progress note   Visits Allowed: Unlimited based on medical necessity   Recertification Date:    Physician Follow-Up: 21   Physician Orders: Aggressive OT eval and treat - ROM 5 x week x 3 weeks, then 3 x week x 3 weeks   Pertinent History: Patient reports that his left shoulder started bothering him about 18 months ago with certain activities. States that he had the most pain with archery activities. States that he had an injection in 2019 and he did well for about a year. States that he went back to the physician this fall after he climbed a ladder and had some pain in his left shoulder. It was determined to have an injection with ultrasound guidance - had this done on 20. Patient states that he had COVID in September and was hospitalized. States that he had high level steroid treatments as inpatient and then had oral steroids for a couple weeks after discharge from hospital. States that then he an autoimmune flare up (had similar episode in  and has been taking celcept). States that he is now taking prednisone and increased amount of celcept.  States that he is not having any pain in the left shoulder due to having quite a bit of steroids in the past few months. Comorbidities include osteopenia that could influence rehab process. SUBJECTIVE: No questions this date with HEP. TREATMENT   Precautions: osteopenia   Pain:  Reports no pain in left shoulder at rest    X in shaded column indicates Activity Completed Today   Modalities Parameters/  Location  Notes/Comments                     Manual Therapy Time/  Technique  Notes/Comments   sidelying left scapular mobilizations      STM to left upper trapezius and rhomboid regions            Exercises   Sets/  Sec Reps  Notes/Comments   Supine PROM to left shoulder in all planes   x Tightness at end ROM   Supine dowel for shoulder flexion 1 10     Supine dowel for chest press 1 10     Scapular retraction 1 15     Backward shoulder circles 1 15     [place] circles with left UE 1 10 in each direction     biodex at 90 speed x 3 minutes forward and 3 minutes backward   x    Roll-over stretch onto left side at 3 different degrees of abduction  3 each x Reported stretch at left posterior capsule   Pulleys for shoulder flexion 1 15 x    Standing sleeper stretch for left shoulder IR 1 7 x In sidelying on 1-7   Inferior capsule stretch  5 x 10 second hold, increased flexion after   Hands clasped or dowel IR up the back 1 10 with a 5 second hold x    Activities Time    Notes/Comments                       Specific Interventions Next Treatment: ROM    Activity/Treatment Tolerance:  [x]  Patient tolerated treatment well  []  Patient limited by fatigue  []  Patient limited by pain   []  Patient limited by other medical complications  []  Other:     Assessment: Patient is progressing toward goals nicely. Good tolerance for therapy. GOALS:  Patient Goal: To have more motion and strength in my left arm. Short Term Goals:  Time Frame: 4 weeks  1. Be independent with HEP as instructed to increase his ability to eventually pull back bow with left hand.   2. Increase active left shoulder flexion to 155, abduction to 155, and ER to 87 to increase his ability to reach overhead for normal daily tasks. 3. Report pain going no higher than 3/10 in left shoulder at any time to allow patient to use left arm in all of his normal daily tasks. 4. Be able to don jacket and tuck in shirt without any pain in left UE. Long Term Goals:  Time Frame: 12 weeks  1. Be able to use left arm for all normal daily tasks without difficulty. 2. Be able to turn steering wheel completely with only left UE without difficulty. 3. Be able to use left arm to retrieve item from upper cupboard without compensation or difficulty. Patient Education:    [x]  HEP/Education Completed:  Review of HEP  []  No new Education completed  []  Reviewed Prior HEP      [x]  Patient verbalized and/or demonstrated understanding of education provided. []  Patient unable to verbalize and/or demonstrate understanding of education provided. Will continue education. [x]  Barriers to learning: slight memory issues    PLAN:    []  Plan of care initiated. Plan to see patient 5 times per week for 3 weeks and then 3 x week x 9 weeks to address the treatment planned outlined above. [x]  Continue with current plan of care  []  Modify plan of care as follows:    []  Hold pending physician visit  []  Discharge    Time In 0930   Time Out 1000       Timed Code Minutes: Minutes Units   ADL (49700)     Aquatics (10024)     Manual Therapy (03698)     Neuro (33300)     Th. Activities (03519)     Th. Exercise (49094) 30 2   Wheelchair Mgmt (93916)     Cognitive Function (1st 15 min - 58518)     Cognitive Function (per sub.  15 min - 69278)     Ultrasound (27366)     Ionto (27001)     Manual E-Stim (81335)          TOTAL TREATMENT TIME: 30 minutes     Richard Vaz, OTR/L [id number]

## 2021-01-08 ENCOUNTER — HOSPITAL ENCOUNTER (OUTPATIENT)
Dept: OCCUPATIONAL THERAPY | Age: 71
Setting detail: THERAPIES SERIES
Discharge: HOME | End: 2021-01-08
Payer: MEDICARE

## 2021-01-08 PROCEDURE — 97110 THERAPEUTIC EXERCISES: CPT

## 2021-01-08 NOTE — PROGRESS NOTES
[address]  [] EVALUATION  [x] DAILY NOTE (LAND) [] DAILY NOTE (AQUATIC ) [] PROGRESS NOTE [] DISCHARGE NOTE    [] [address] [facility]   [] [address]    [x] [facility]   [] May Apley    Date: 2021  Patient Name:  Shawanda Price  :   MRN: [id number]  CSN: [id number]    Referring Practitioner Shahab Ontiveros MD   Diagnosis LEFT FROZEN SHOULDER    Treatment Diagnosis Decreased ROM left shoulder   Date of Evaluation 20      Functional Outcome Measure Used UEFS   Functional Outcome Score 69/80 (20)       Insurance: Primary: Payor: Violeta Gregory /  /  / ,   Secondary: Maribel Rodriguez Information: No ionto, no hot/cold packs   Visit # 7, 7/10 for progress note   Visits Allowed: Unlimited based on medical necessity   Recertification Date:    Physician Follow-Up: 21   Physician Orders: Aggressive OT eval and treat - ROM 5 x week x 3 weeks, then 3 x week x 3 weeks   Pertinent History: Patient reports that his left shoulder started bothering him about 18 months ago with certain activities. States that he had the most pain with archery activities. States that he had an injection in 2019 and he did well for about a year. States that he went back to the physician this fall after he climbed a ladder and had some pain in his left shoulder. It was determined to have an injection with ultrasound guidance - had this done on 20. Patient states that he had COVID in September and was hospitalized. States that he had high level steroid treatments as inpatient and then had oral steroids for a couple weeks after discharge from hospital. States that then he an autoimmune flare up (had similar episode in  and has been taking celcept). States that he is now taking prednisone and increased amount of celcept.  States that he is not having any pain in the left shoulder due to having quite a bit of steroids in the past few months. Comorbidities include osteopenia that could influence rehab process. SUBJECTIVE: Reports tightness at end range flexion       TREATMENT   Precautions: osteopenia   Pain:  Reports no pain in left shoulder at rest    X in shaded column indicates Activity Completed Today   Modalities Parameters/  Location  Notes/Comments                     Manual Therapy Time/  Technique  Notes/Comments   sidelying left scapular mobilizations  x    STM to left upper trapezius and rhomboid regions            Exercises   Sets/  Sec Reps  Notes/Comments   Supine PROM to left shoulder in all planes   x Tightness at end ROM   Supine dowel for shoulder flexion 1 10 x    Supine dowel for chest press 1 10     Scapular retraction 1 15     Backward shoulder circles 1 15     [place] circles with left UE 1 10 in each direction x    biodex at 90 speed x 3 minutes forward and 3 minutes backward   x    Roll-over stretch onto left side at 3 different degrees of abduction  3 each  Reported stretch at left posterior capsule   Pulleys for shoulder flexion 1 15 x    Standing sleeper stretch for left shoulder IR 1 7 x In sidelying on 1-8   Inferior capsule stretch  5 x 10 second hold, increased flexion after   Hands clasped or dowel IR up the back 1 10 with a 5 second hold     Activities Time    Notes/Comments                       Specific Interventions Next Treatment: ROM    Activity/Treatment Tolerance:  [x]  Patient tolerated treatment well  []  Patient limited by fatigue  []  Patient limited by pain   []  Patient limited by other medical complications  []  Other:     Assessment: Patient is progressing toward goals nicely. Increased ease with rainbow stretches at the end of the session. Increased focus on flexion this date due to tightness. GOALS:  Patient Goal: To have more motion and strength in my left arm. Short Term Goals:  Time Frame: 4 weeks  1.  Be independent with HEP as

## 2021-01-11 ENCOUNTER — HOSPITAL ENCOUNTER (OUTPATIENT)
Dept: OCCUPATIONAL THERAPY | Age: 71
Setting detail: THERAPIES SERIES
Discharge: HOME | End: 2021-01-11
Payer: MEDICARE

## 2021-01-11 PROCEDURE — 97110 THERAPEUTIC EXERCISES: CPT

## 2021-01-11 NOTE — PROGRESS NOTES
having quite a bit of steroids in the past few months. Comorbidities include osteopenia that could influence rehab process. SUBJECTIVE: Reports that some of his exercises are getting easier. \"Not perfect but easier. \"      TREATMENT   Precautions: osteopenia   Pain:  Reports no pain in left shoulder at rest    X in shaded column indicates Activity Completed Today   Modalities Parameters/  Location  Notes/Comments                     Manual Therapy Time/  Technique  Notes/Comments   sidelying left scapular mobilizations      STM to left upper trapezius and rhomboid regions            Exercises   Sets/  Sec Reps  Notes/Comments   Supine PROM to left shoulder in all planes   x Good ROM - slight tight at end range flexion   Supine dowel for shoulder flexion 1 10     Supine dowel for chest press 1 10     Scapular retraction 1 15     Backward shoulder circles 1 15     [place] circles with left UE 1 10 in each direction x    biodex at 60 speed x 3 minutes forward and 3 minutes backward   x    Roll-over stretch onto left side at 3 different degrees of abduction  3 each  Reported stretch at left posterior capsule   Pulleys for shoulder flexion 1 15 x    Standing sleeper stretch for left shoulder IR 1 7  In sidelying on 1-8   Inferior capsule stretch  5 x 10 second hold, increased flexion after   Towel slide up the back for IR  5 x    Scapular retraction and protraction with hands behind the head  15 x    Wall slides in flexion 1x 10  10 second hold   Hands clasped or dowel IR up the back 1 10 with a 5 second hold     Activities Time    Notes/Comments                       Specific Interventions Next Treatment: ROM    Activity/Treatment Tolerance:  [x]  Patient tolerated treatment well  []  Patient limited by fatigue  []  Patient limited by pain   []  Patient limited by other medical complications  []  Other:     Assessment: Patient is progressing toward goals nicely.  Reports that some of the exercises are getting easier. Decreased tightness at end range flexion noted this date. GOALS:  Patient Goal: To have more motion and strength in my left arm. Short Term Goals:  Time Frame: 4 weeks  1. Be independent with HEP as instructed to increase his ability to eventually pull back bow with left hand. 2. Increase active left shoulder flexion to 155, abduction to 155, and ER to 87 to increase his ability to reach overhead for normal daily tasks. 3. Report pain going no higher than 3/10 in left shoulder at any time to allow patient to use left arm in all of his normal daily tasks. 4. Be able to don jacket and tuck in shirt without any pain in left UE. Long Term Goals:  Time Frame: 12 weeks  1. Be able to use left arm for all normal daily tasks without difficulty. 2. Be able to turn steering wheel completely with only left UE without difficulty. 3. Be able to use left arm to retrieve item from upper cupboard without compensation or difficulty. Patient Education:    [x]  HEP/Education Completed:  Review of HEP  []  No new Education completed  []  Reviewed Prior HEP      [x]  Patient verbalized and/or demonstrated understanding of education provided. []  Patient unable to verbalize and/or demonstrate understanding of education provided. Will continue education. [x]  Barriers to learning: slight memory issues    PLAN:    []  Plan of care initiated. Plan to see patient 5 times per week for 3 weeks and then 3 x week x 9 weeks to address the treatment planned outlined above. [x]  Continue with current plan of care  []  Modify plan of care as follows:    []  Hold pending physician visit  []  Discharge    Time In 1520   Time Out 1545       Timed Code Minutes: Minutes Units   ADL (64000)     Aquatics (68926)     Manual Therapy (15614)     Neuro (15969)     Th. Activities (59519)     Th. Exercise (76824) 25 2   Wheelchair Mgmt (43345)     Cognitive Function (1st 15 min - 24645)     Cognitive Function (per sub.  15 min - 27612)     Ultrasound (49677)     Ionto (84865)     Manual E-Stim (24194)          TOTAL TREATMENT TIME: 25 minutes     [place], [place], OTR/L [id number]

## 2021-01-12 ENCOUNTER — HOSPITAL ENCOUNTER (OUTPATIENT)
Dept: OCCUPATIONAL THERAPY | Age: 71
Setting detail: THERAPIES SERIES
Discharge: HOME | End: 2021-01-12
Payer: MEDICARE

## 2021-01-12 PROCEDURE — 97110 THERAPEUTIC EXERCISES: CPT

## 2021-01-12 NOTE — PROGRESS NOTES
[address]  [] EVALUATION  [x] DAILY NOTE (LAND) [] DAILY NOTE (AQUATIC ) [] PROGRESS NOTE [] DISCHARGE NOTE    [] [facility]   [x] [address]    [] [facility]   [] Tanna Crowley    Date: 2021  Patient Name:  Jyoti Maynard  : [birth year]  MRN: [id number]  CSN: [id number]    Referring Practitioner Akira Armando MD   Diagnosis LEFT FROZEN SHOULDER    Treatment Diagnosis Decreased ROM left shoulder   Date of Evaluation 20      Functional Outcome Measure Used UEFS   Functional Outcome Score 69/80 (20)       Insurance: Primary: Payor: Sonia Castaneda /  /  / ,   Secondary: [facility]   Authorization Information: No ionto, no hot/cold packs   Visit # 9, 9/10 for progress note   Visits Allowed: Unlimited based on medical necessity   Recertification Date: 3/11/65   Physician Follow-Up: 21   Physician Orders: Aggressive OT eval and treat - ROM 5 x week x 3 weeks, then 3 x week x 3 weeks   Pertinent History: Patient reports that his left shoulder started bothering him about 18 months ago with certain activities. States that he had the most pain with archery activities. States that he had an injection in 2019 and he did well for about a year. States that he went back to the physician this fall after he climbed a ladder and had some pain in his left shoulder. It was determined to have an injection with ultrasound guidance - had this done on 20. Patient states that he had COVID in September and was hospitalized. States that he had high level steroid treatments as inpatient and then had oral steroids for a couple weeks after discharge from hospital. States that then he an autoimmune flare up (had similar episode in  and has been taking celcept). States that he is now taking prednisone and increased amount of celcept.  States that he is not having any pain in the left shoulder due to having quite a bit of steroids in the past few months. Comorbidities include osteopenia that could influence rehab process. SUBJECTIVE: States that his shoulder is \"real good. \" States that he isn't having any difficulty with any of the exercises. TREATMENT   Precautions: osteopenia   Pain:  Reports no pain in left shoulder at any time    X in shaded column indicates Activity Completed Today   Modalities Parameters/  Location  Notes/Comments                     Manual Therapy Time/  Technique  Notes/Comments   sidelying left scapular mobilizations      STM to left upper trapezius and rhomboid regions  x          Exercises   Sets/  Sec Reps  Notes/Comments   Supine PROM to left shoulder in all planes   x    Supine dowel for shoulder flexion 1 10     Supine dowel for chest press 1 10     Scapular retraction 1 15     Backward shoulder circles 1 15     [place] circles with left UE 1 10 in each direction x    biodex at 60 speed x 3 minutes forward and 3 minutes backward       Roll-over stretch onto left side at 3 different degrees of abduction  3 each  Reported stretch at left posterior capsule   Pulleys for shoulder flexion 1 15 x    Standing sleeper stretch for left shoulder IR 1 10 x    Inferior capsule stretch  5  10 second hold, increased flexion after   Towel slide up the back for IR  5     Scapular retraction and protraction with hands behind the head  15     Wall slides in flexion 1 10 x 10 second hold   Hands clasped or dowel IR up the back 1 10 with a 5 second hold     Activities Time    Notes/Comments                       Specific Interventions Next Treatment: ROM    Activity/Treatment Tolerance:  [x]  Patient tolerated treatment well  []  Patient limited by fatigue  []  Patient limited by pain   []  Patient limited by other medical complications  []  Other:     Assessment: Patient is progressing toward goals nicely. Tightness in left shoulder end ROM is improving nicely.         GOALS:  Patient Goal: alpesh Silverman, OTR/L #[id number]

## 2021-01-13 ENCOUNTER — HOSPITAL ENCOUNTER (OUTPATIENT)
Dept: OCCUPATIONAL THERAPY | Age: 71
Setting detail: THERAPIES SERIES
Discharge: HOME | End: 2021-01-13
Payer: MEDICARE

## 2021-01-13 PROCEDURE — 97110 THERAPEUTIC EXERCISES: CPT

## 2021-01-13 NOTE — PROGRESS NOTES
[address]  [] EVALUATION  [] DAILY NOTE (LAND) [] DAILY NOTE (AQUATIC ) [x] PROGRESS NOTE [] DISCHARGE NOTE    [] [facility]   [] [address]    [x] [facility] KRISTI   [] Shirley Foil    Date: 2021  Patient Name:  Katiuska Anderson  : [birth year]  MRN: [id number]  CSN: [id number]    Referring Practitioner Tao Flores MD   Diagnosis LEFT FROZEN SHOULDER    Treatment Diagnosis Decreased ROM left shoulder   Date of Evaluation 20      Functional Outcome Measure Used UEFS   Functional Outcome Score 69/80 (20)       Insurance: Primary: Payor: Jamarcus Rama /  /  / ,   Secondary: [facility]   Authorization Information: No ionto, no hot/cold packs   Visit # 10, PN completed on    Visits Allowed: Unlimited based on medical necessity   Recertification Date:    Physician Follow-Up: 21   Physician Orders: Aggressive OT eval and treat - ROM 5 x week x 3 weeks, then 3 x week x 3 weeks   Pertinent History: Patient reports that his left shoulder started bothering him about 18 months ago with certain activities. States that he had the most pain with archery activities. States that he had an injection in 2019 and he did well for about a year. States that he went back to the physician this fall after he climbed a ladder and had some pain in his left shoulder. It was determined to have an injection with ultrasound guidance - had this done on 20. Patient states that he had COVID in September and was hospitalized. States that he had high level steroid treatments as inpatient and then had oral steroids for a couple weeks after discharge from hospital. States that then he an autoimmune flare up (had similar episode in  and has been taking celcept). States that he is now taking prednisone and increased amount of celcept.  States that he is not having any pain in the left shoulder due to having quite a bit of steroids in the past few months. Comorbidities include osteopenia that could influence rehab process. SUBJECTIVE: Patient states that he is feeling \"very good\" compared to how he was doing on his evaluation compared to today. States that he feels as though he has better strength and better range. States that he plans to continue to do his HEP after discharge from therapy.      TREATMENT   Precautions: osteopenia   Pain:  Reports no pain in left shoulder at any time    X in shaded column indicates Activity Completed Today   Modalities Parameters/  Location  Notes/Comments                     Manual Therapy Time/  Technique  Notes/Comments   sidelying left scapular mobilizations      STM to left upper trapezius and rhomboid regions            Exercises   Sets/  Sec Reps  Notes/Comments                        Blue theraband - riivalid 1 15 in each direction x    Green theraband - riivalid 1 15 in each direction x    [place] circles with left UE 1 10 in each direction     biodex at 60 speed x 3 minutes forward and 3 minutes backward   X    Roll-over stretch onto left side at 3 different degrees of abduction  3 each  Reported stretch at left posterior capsule   Pulleys for shoulder flexion 1 15     Standing sleeper stretch for left shoulder IR 1 10     Inferior capsule stretch  5  10 second hold, increased flexion after   Towel slide up the back for IR  5     Scapular retraction and protraction with hands behind the head  15     Wall slides in flexion 1 10  10 second hold   Hands clasped or dowel IR up the back 1 10 with a 5 second hold     Activities Time    Notes/Comments                       LEFT UPPER EXTREMITY  RANGE OF MOTION    AROM PROM COMMENTS         Shoulder Flexion 160     Shoulder Extension 80     Shoulder Abduction 170     Shoulder Adduction      Shoulder External Rotation 90     Shoulder Internal Rotation Can get left thumb 10\" above waistband behind back     Shoulder Range of Motion is [place]  []       LEFT UPPER EXTREMITY  STRENGTH    Strength Rating Comments   Shoulder Flexion 4+/5    Shoulder Extension 4+/5    Shoulder Abduction 4+/5    Shoulder Adduction 4+/5    Shoulder External Rotation 4+/5    Shoulder Internal Rotation 4+/5    []  Shoulder Strength is grossly WFL. Specific Interventions Next Treatment: strengthening    Activity/Treatment Tolerance:  [x]  Patient tolerated treatment well  []  Patient limited by fatigue  []  Patient limited by pain   []  Patient limited by other medical complications  []  Other:     Assessment:   Patient is making very nice progress toward goals and is very compliant with HEP. Patient was started on strengthening exercises for left shoulder and had no issues. Patient's AROM has progressed very nicely and has very little tightness in end ROM at present time. Further skilled therapy services are required to address strengthening of left UE and progression of HEP for patient to continue with after discharge from therapy services. GOALS:  Patient Goal: To have more motion and strength in my left arm. Short Term Goals:  Time Frame: NO NEW STG FORMULATED DUE TO LIMITED ELOS  1. Be independent with HEP as instructed to increase his ability to eventually pull back bow with left hand. GOAL MET - GOAL DISCONTINUED - SEE BELOW  2. Increase active left shoulder flexion to 155, abduction to 155, and ER to 87 to increase his ability to reach overhead for normal daily tasks. GOAL MET - SEE ABOVE FOR DETAILS - GOAL DISCONTINUED  3. Report pain going no higher than 3/10 in left shoulder at any time to allow patient to use left arm in all of his normal daily tasks. GOAL MET- GOAL DISCONTINUED  4. Be able to don jacket and tuck in shirt without any pain in left UE. GOAL MET - GOAL DISCONTINUED  Long Term Goals:  Time Frame: 10 weeks  1. Be able to use left arm for all normal daily tasks without difficulty.  GOAL MET - REVISED GOAL: Be independent with HEP as instructed to increase his ability to eventually pull back bow with left hand. 2. Be able to turn steering wheel completely with only left UE without difficulty. GOAL MET - REVISED GOAL; Increase MMT of left shoulder musculature to 5/5 to increase his ability to complete his normal activities without difficulty. 3. Be able to use left arm to retrieve item from upper cupboard without compensation or difficulty. GOAL MET - GOAL DISCONTINUED    Patient Education:   [x]  HEP/Education Completed:  Goal status; green and blue [employer] - taz and jonas protocol  []  No new Education completed  []  Reviewed Prior HEP      [x]  Patient verbalized and/or demonstrated understanding of education provided. []  Patient unable to verbalize and/or demonstrate understanding of education provided. Will continue education. [x]  Barriers to learning: slight memory issues    PLAN:    []  Plan of care initiated. []  Continue with current plan of care  [x]  Modify plan of care as follows: See patient 2 x more this week and then 1 x week x 9 weeks   []  Hold pending physician visit  []  Discharge    Time In 0845   Time Out 0915       Timed Code Minutes: Minutes Units   ADL (33491)     Aquatics (09722)     Manual Therapy (29010)     Neuro (31331)     Th. Activities (25610)     Th. Exercise (57665) 30 2   Wheelchair Mgmt (06932)     Cognitive Function (1st 15 min - 99207)     Cognitive Function (per sub.  15 min - 19100)     Ultrasound (63493)     Ionto (08594)     Manual E-Stim (78231)          TOTAL TREATMENT TIME: 30 minutes     Sofi Avila OTR/L #[id number]

## 2021-01-14 ENCOUNTER — HOSPITAL ENCOUNTER (OUTPATIENT)
Dept: OCCUPATIONAL THERAPY | Age: 71
Setting detail: THERAPIES SERIES
Discharge: HOME | End: 2021-01-14
Payer: MEDICARE

## 2021-01-14 PROCEDURE — 97110 THERAPEUTIC EXERCISES: CPT

## 2021-01-14 NOTE — PROGRESS NOTES
[address]  [] EVALUATION  [x] DAILY NOTE (LAND) [] DAILY NOTE (AQUATIC ) [] PROGRESS NOTE [] DISCHARGE NOTE    [] [address] [facility]   [x] [address]    [] [facility]   [] Darlyn Rajan    Date: 2021  Patient Name:  Arian Jimenez  : [birth year]  MRN: [id number]  CSN: [id number]    Referring Practitioner Elvin Weeks MD   Diagnosis LEFT FROZEN SHOULDER    Treatment Diagnosis Decreased ROM left shoulder   Date of Evaluation 20      Functional Outcome Measure Used UEFS   Functional Outcome Score 69/80 (20)       Insurance: Primary: Payor: [facility] /  /  / ,   Secondary: [facility]   Authorization Information: No ionto, no hot/cold packs   Visit # 11, 1/10 for PN,  PN completed on    Visits Allowed: Unlimited based on medical necessity   Recertification Date:    Physician Follow-Up: No follow up with physician at current time   Physician Orders: Aggressive OT eval and treat - ROM 5 x week x 3 weeks, then 3 x week x 3 weeks   Pertinent History: Patient reports that his left shoulder started bothering him about 18 months ago with certain activities. States that he had the most pain with archery activities. States that he had an injection in 2019 and he did well for about a year. States that he went back to the physician this fall after he climbed a ladder and had some pain in his left shoulder. It was determined to have an injection with ultrasound guidance - had this done on 20. Patient states that he had COVID in September and was hospitalized. States that he had high level steroid treatments as inpatient and then had oral steroids for a couple weeks after discharge from hospital. States that then he an autoimmune flare up (had similar episode in  and has been taking celcept). States that he is now taking prednisone and increased amount of celcept.  States that he is not having any pain in the left shoulder due to having quite a bit of steroids in the past few months. Comorbidities include osteopenia that could influence rehab process. SUBJECTIVE: Patient reports that he cancelled his physician appointment because he was told that he could cancel if he didn't feel as though he needed to go. Patient relates that he didn't have any increased pain after doing strengthening exercises yesterday.       TREATMENT   Precautions: osteopenia   Pain:  Reports no pain in left shoulder at any time    X in shaded column indicates Activity Completed Today   Modalities Parameters/  Location  Notes/Comments                     Manual Therapy Time/  Technique  Notes/Comments   sidelying left scapular mobilizations      STM to left upper trapezius and rhomboid regions            Exercises   Sets/  Sec Reps  Notes/Comments   Blue theraband - overhead throw with left UE 1 15 x    Blue theraband - horizontal abduction/adduction with left UE 1 15 x    Prone left shoulder scaption with palm down and with thumb up 1 10 each x 3 second hold   Prone left shoulder horizontal abduction with palm down and with thumb up 1 10 each x 3 second hold   Blue theraband - riivalid 1 15 in each direction x    Green theraband - riivalid 1 15 in each direction     [place] circles with left UE 1 10 in each direction x    biodex at 60 speed x 3 minutes forward and 3 minutes backward   x    Roll-over stretch onto left side at 3 different degrees of abduction  3 each  Reported stretch at left posterior capsule   Pulleys for shoulder flexion 1 15 x    Standing sleeper stretch for left shoulder IR 1 10     Inferior capsule stretch  5  10 second hold, increased flexion after   Towel slide up the back for IR  5     Scapular retraction and protraction with hands behind the head  15     Wall slides in flexion 1 10  10 second hold   Hands clasped or dowel IR up the back 1 10 with a 5 second hold     Activities Time Notes/Comments                         Specific Interventions Next Treatment: strengthening    Activity/Treatment Tolerance:  [x]  Patient tolerated treatment well  []  Patient limited by fatigue  []  Patient limited by pain   []  Patient limited by other medical complications  []  Other:     Assessment:   Progressing toward goals nicely. Tolerating strengthening well  GOALS:  Patient Goal: To have more motion and strength in my left arm. Short Term Goals:  Time Frame: NO NEW STG FORMULATED DUE TO LIMITED ELOS    Long Term Goals:  Time Frame: 10 weeks  1. Be independent with HEP as instructed to increase his ability to eventually pull back bow with left hand. 2.  Increase MMT of left shoulder musculature to 5/5 to increase his ability to complete his normal activities without difficulty. Patient Education:   [x]  HEP/Education Completed:  Blue theraband - overhead throw, horizontal abduction/adduction; hughstons with left UE  []  No new Education completed  []  Reviewed Prior HEP      [x]  Patient verbalized and/or demonstrated understanding of education provided. []  Patient unable to verbalize and/or demonstrate understanding of education provided. Will continue education. [x]  Barriers to learning: slight memory issues    PLAN:    []  Plan of care initiated. [x]  Continue with current plan of care  []  Modify plan of care as follows: See patient 2 x more this week and then 1 x week x 9 weeks from 1/13/21  []  Hold pending physician visit  []  Discharge    Time In 1000   Time Out 1030       Timed Code Minutes: Minutes Units   ADL ([id number])     Aquatics (05738)     Manual Therapy (11792)     Neuro (83832)     Th. Activities (63012)     Th. Exercise (23419) 30 2   Wheelchair Mgmt (48069)     Cognitive Function (1st 15 min - 42669)     Cognitive Function (per sub. 15 min - 42919)     Ultrasound (41100)     Ionto (00143)     Manual E-Stim (72949)          TOTAL TREATMENT TIME: 30 minutes     Andre Staff. Nico Garcia, OTR/L #[id number]

## 2021-01-15 ENCOUNTER — HOSPITAL ENCOUNTER (OUTPATIENT)
Dept: OCCUPATIONAL THERAPY | Age: 71
Setting detail: THERAPIES SERIES
Discharge: HOME | End: 2021-01-15
Payer: MEDICARE

## 2021-01-15 PROCEDURE — 97110 THERAPEUTIC EXERCISES: CPT

## 2021-01-15 NOTE — PROGRESS NOTES
[address]  [] EVALUATION  [x] DAILY NOTE (LAND) [] DAILY NOTE (AQUATIC ) [] PROGRESS NOTE [] DISCHARGE NOTE    [] [address] [facility]   [] [address]    [x] [facility]   [] Bowen Abbasi    Date: 1/15/2021  Patient Name:  Maxime Ha  : [birth year]  MRN: [id number]  CSN: [id number]    Referring Practitioner Eve Alvarado MD   Diagnosis LEFT FROZEN SHOULDER    Treatment Diagnosis Decreased ROM left shoulder   Date of Evaluation 20      Functional Outcome Measure Used UEFS   Functional Outcome Score 69/80 (20)       Insurance: Primary: Payor: Landry Severino /  /  / ,   Secondary: [employer] Information: No ionto, no hot/cold packs   Visit # 12, 2/10 for PN,  PN completed on    Visits Allowed: Unlimited based on medical necessity   Recertification Date:    Physician Follow-Up: No follow up with physician at current time   Physician Orders: Aggressive OT eval and treat - ROM 5 x week x 3 weeks, then 3 x week x 3 weeks   Pertinent History: Patient reports that his left shoulder started bothering him about 18 months ago with certain activities. States that he had the most pain with archery activities. States that he had an injection in 2019 and he did well for about a year. States that he went back to the physician this fall after he climbed a ladder and had some pain in his left shoulder. It was determined to have an injection with ultrasound guidance - had this done on 20. Patient states that he had COVID in September and was hospitalized. States that he had high level steroid treatments as inpatient and then had oral steroids for a couple weeks after discharge from hospital. States that then he an autoimmune flare up (had similar episode in  and has been taking celcept). States that he is now taking prednisone and increased amount of celcept.  States that he is not having any pain in the left shoulder due to having quite a bit of steroids in the past few months. Comorbidities include osteopenia that could influence rehab process. SUBJECTIVE: States that he did have some pain in his neck last night but the pain is not present currently.       TREATMENT   Precautions: osteopenia   Pain:  Reports no pain in left shoulder at any time    X in shaded column indicates Activity Completed Today   Modalities Parameters/  Location  Notes/Comments                     Manual Therapy Time/  Technique  Notes/Comments   sidelying left scapular mobilizations      STM to left upper trapezius and rhomboid regions            Exercises   Sets/  Sec Reps  Notes/Comments   Blue theraband - overhead throw with left UE 1 15     Blue theraband - horizontal abduction/adduction with left UE 1 15     Modified hughston- left shoulder scaption with palm down and with thumb up 1 10 each x 3 second hold   Modified [place] - left shoulder horizontal abduction with palm down and with thumb up 1 10 each x 3 second hold   Blue theraband - riivalid 1 15 in each direction x    Blue theraband - jonas with left UE 1 15 in each direction x    [place] circles with left UE 1 10 in each direction     biodex at 60 speed x 3 minutes forward and 3 minutes backward   x    Roll-over stretch onto left side at 3 different degrees of abduction  3 each  Reported stretch at left posterior capsule   Pulleys for shoulder flexion 1 15 x    Standing sleeper stretch for left shoulder IR 1 10 x    Inferior capsule stretch  5  10 second hold, increased flexion after   Towel slide up the back for IR  5     Scapular retraction and protraction with hands behind the head  15     Wall slides in flexion 1 10  10 second hold   Hands clasped or dowel IR up the back 1 10 with a 5 second hold     Activities Time    Notes/Comments                         Specific Interventions Next Treatment: strengthening    Activity/Treatment Tolerance:  [x]  Patient tolerated treatment well  []  Patient limited by fatigue  []  Patient limited by pain   []  Patient limited by other medical complications  []  Other:     Assessment:   Progressing toward goals nicely. Tolerating strengthening well  GOALS:  Patient Goal: To have more motion and strength in my left arm. Short Term Goals:  Time Frame: NO NEW STG FORMULATED DUE TO LIMITED ELOS    Long Term Goals:  Time Frame: 10 weeks  1. Be independent with HEP as instructed to increase his ability to eventually pull back bow with left hand. 2.  Increase MMT of left shoulder musculature to 5/5 to increase his ability to complete his normal activities without difficulty. Patient Education:   []  HEP/Education Completed:    []  No new Education completed  [x]  Reviewed Prior HEP      [x]  Patient verbalized and/or demonstrated understanding of education provided. []  Patient unable to verbalize and/or demonstrate understanding of education provided. Will continue education. [x]  Barriers to learning: slight memory issues    PLAN:    []  Plan of care initiated. [x]  Continue with current plan of care  []  Modify plan of care as follows: See patient 2 x more this week and then 1 x week x 9 weeks from 1/13/21  []  Hold pending physician visit  []  Discharge    Time In 0930   Time Out 1000       Timed Code Minutes: Minutes Units   ADL (70869)     Aquatics (04188)     Manual Therapy (19272)     Neuro (85763)     Th. Activities (26818)     Th. Exercise (73913) 30 2   Wheelchair Mgmt (54793)     Cognitive Function (1st 15 min - 51683)     Cognitive Function (per sub.  15 min - 56244)     Ultrasound (34947)     Ionto (71246)     Manual E-Stim (87044)          TOTAL TREATMENT TIME: 30 minutes     Tamara Osler, OTR/ANTONI #[id number]

## 2021-01-20 ENCOUNTER — HOSPITAL ENCOUNTER (OUTPATIENT)
Dept: OCCUPATIONAL THERAPY | Age: 71
Setting detail: THERAPIES SERIES
Discharge: HOME | End: 2021-01-20
Payer: MEDICARE

## 2021-01-20 PROCEDURE — 97110 THERAPEUTIC EXERCISES: CPT

## 2021-01-20 NOTE — PROGRESS NOTES
[address]  [] EVALUATION  [x] DAILY NOTE (LAND) [] DAILY NOTE (AQUATIC ) [] PROGRESS NOTE [] DISCHARGE NOTE    [] [facility]   [] [place] [address]    [x] [facility]   [] Sraa Villagomezksunday    Date: 2021  Patient Name:  Mony Howard  : [birth year]  MRN: [id number]  CSN: [id number]    Referring Practitioner Delaney Vides MD   Diagnosis LEFT FROZEN SHOULDER    Treatment Diagnosis Decreased ROM left shoulder   Date of Evaluation 20      Functional Outcome Measure Used UEFS   Functional Outcome Score 69/80 (20)       Insurance: Primary: Payor: Eli Romeo /  /  / ,   Secondary: Marilu Fields   Authorization Information: No ionto, no hot/cold packs   Visit # 13, 3/10 for PN,  PN completed on    Visits Allowed: Unlimited based on medical necessity   Recertification Date:    Physician Follow-Up: No follow up with physician at current time   Physician Orders: Aggressive OT eval and treat - ROM 5 x week x 3 weeks, then 3 x week x 3 weeks   Pertinent History: Patient reports that his left shoulder started bothering him about 18 months ago with certain activities. States that he had the most pain with archery activities. States that he had an injection in 2019 and he did well for about a year. States that he went back to the physician this fall after he climbed a ladder and had some pain in his left shoulder. It was determined to have an injection with ultrasound guidance - had this done on 20. Patient states that he had COVID in September and was hospitalized. States that he had high level steroid treatments as inpatient and then had oral steroids for a couple weeks after discharge from hospital. States that then he an autoimmune flare up (had similar episode in  and has been taking celcept). States that he is now taking prednisone and increased amount of celcept.  States that he is not having any pain in the left shoulder due to having quite a bit of steroids in the past few months. Comorbidities include osteopenia that could influence rehab process. SUBJECTIVE: States that things are going well and that he has tolerated HEP without issues. States that he thinks that his motion of the left shoulder has improved in the past week. States that he was able to work some horses yesterday.       TREATMENT   Precautions: osteopenia   Pain:  Reports no pain in left shoulder at any time    X in shaded column indicates Activity Completed Today   Modalities Parameters/  Location  Notes/Comments                     Manual Therapy Time/  Technique  Notes/Comments   sidelying left scapular mobilizations      STM to left upper trapezius and rhomboid regions            Exercises   Sets/  Sec Reps  Notes/Comments   Blue theraband - overhead throw with left UE 1 15     Blue theraband - horizontal abduction/adduction with left UE 1 15     Modified hughston- left shoulder scaption with palm down and with thumb up with 2# 1 10 each x    Modified hughston - left shoulder horizontal abduction with palm down and with thumb up with 2# 1 10 each x    Blue theraband - riivalid 1 15 in each direction x    Blue theraband - jonas with left UE 1 15 in each direction x    Alanson circles with left UE 1 10 in each direction     biodex at 50 speed x 3 minutes forward and 3 minutes backward   x    Roll-over stretch onto left side at 3 different degrees of abduction  3 each  Reported stretch at left posterior capsule   Pulleys for shoulder flexion 1 15     Standing sleeper stretch for left shoulder IR 1 10     Inferior capsule stretch  5  10 second hold, increased flexion after   Towel slide up the back for IR  5     Scapular retraction and protraction with hands behind the head  15     Wall slides in flexion 1 10  10 second hold   Hands clasped or dowel IR up the back 1 10 with a 5 second hold     Activities Time Notes/Comments                         Specific Interventions Next Treatment: strengthening    Activity/Treatment Tolerance:  [x]  Patient tolerated treatment well  []  Patient limited by fatigue  []  Patient limited by pain   []  Patient limited by other medical complications  []  Other:     Assessment:   Progressing toward goals nicely. No c/o pain and tolerated increased strengthening this date without issues. GOALS:  Patient Goal: To have more motion and strength in my left arm. Short Term Goals:  Time Frame: NO NEW STG FORMULATED DUE TO LIMITED ELOS    Long Term Goals:  Time Frame: 10 weeks  1. Be independent with HEP as instructed to increase his ability to eventually pull back bow with left hand. 2.  Increase MMT of left shoulder musculature to 5/5 to increase his ability to complete his normal activities without difficulty. Patient Education:   [x]  HEP/Education Completed:  Increase reps with blue theraband, to add 2# to modified liza  []  No new Education completed  []  Reviewed Prior HEP      [x]  Patient verbalized and/or demonstrated understanding of education provided. []  Patient unable to verbalize and/or demonstrate understanding of education provided. Will continue education. [x]  Barriers to learning: slight memory issues    PLAN:    []  Plan of care initiated. [x]  Continue with current plan of care  []  Modify plan of care as follows: See patient 2 x more this week and then 1 x week x 9 weeks from 1/13/21  []  Hold pending physician visit  []  Discharge    Time In 1030   Time Out 1100       Timed Code Minutes: Minutes Units   ADL (88608)     Aquatics (49976)     Manual Therapy (04666)     Neuro (97845)     Th. Activities (03446)     Th. Exercise (52554) 30 2   Wheelchair Mgmt (86701)     Cognitive Function (1st 15 min - 37077)     Cognitive Function (per sub.  15 min - 90567)     Ultrasound (38742)     Ionto (32094)     Manual E-Stim (64397)          TOTAL TREATMENT TIME: 30 alpesh Epperson, OTR/L #[id number]

## 2021-01-27 ENCOUNTER — HOSPITAL ENCOUNTER (OUTPATIENT)
Dept: OCCUPATIONAL THERAPY | Age: 71
Setting detail: THERAPIES SERIES
Discharge: HOME | End: 2021-01-27
Payer: MEDICARE

## 2021-01-27 PROCEDURE — 97110 THERAPEUTIC EXERCISES: CPT

## 2021-01-27 NOTE — PROGRESS NOTES
[address]  [] EVALUATION  [x] DAILY NOTE (LAND) [] DAILY NOTE (AQUATIC ) [] PROGRESS NOTE [] DISCHARGE NOTE    [] [address] [facility]   [] [place] [address]    [x] [facility] [place]   [] Chilton Cheadle    Date: 2021  Patient Name:  Jean Claude Whitney  :   MRN: [id number]  CSN: [id number]    Referring Practitioner Sonu Carlson MD   Diagnosis LEFT FROZEN SHOULDER    Treatment Diagnosis Decreased ROM left shoulder   Date of Evaluation 20      Functional Outcome Measure Used UEFS   Functional Outcome Score 69/80 (20)       Insurance: Primary: Payor: Florencia Ho /  /  / ,   Secondary: Pradeep Devi Information: No ionto, no hot/cold packs   Visit # 14, 4/10 for PN,  PN completed on    Visits Allowed: Unlimited based on medical necessity   Recertification Date:    Physician Follow-Up: No follow up with physician at current time   Physician Orders: Aggressive OT eval and treat - ROM 5 x week x 3 weeks, then 3 x week x 3 weeks   Pertinent History: Patient reports that his left shoulder started bothering him about 18 months ago with certain activities. States that he had the most pain with archery activities. States that he had an injection in 2019 and he did well for about a year. States that he went back to the physician this fall after he climbed a ladder and had some pain in his left shoulder. It was determined to have an injection with ultrasound guidance - had this done on 20. Patient states that he had COVID in September and was hospitalized. States that he had high level steroid treatments as inpatient and then had oral steroids for a couple weeks after discharge from hospital. States that then he an autoimmune flare up (had similar episode in  and has been taking celcept). States that he is now taking prednisone and increased amount of celcept.  States that he is not having any pain in the left shoulder due to having quite a bit of steroids in the past few months. Comorbidities include osteopenia that could influence rehab process. SUBJECTIVE: States that he is wondering about how to progress HEP after planned discharge from therapy next week. States that he hasn't been feeling any type of tightness in his left shoulder in recent week.       TREATMENT   Precautions: osteopenia   Pain:  Reports no pain in left shoulder at any time    X in shaded column indicates Activity Completed Today   Modalities Parameters/  Location  Notes/Comments                     Manual Therapy Time/  Technique  Notes/Comments   sidelying left scapular mobilizations      STM to left upper trapezius and rhomboid regions            Exercises   Sets/  Sec Reps  Notes/Comments   Blue theraband - overhead throw with left UE 1 20 x    Blue theraband - horizontal abduction/adduction with left UE 1 20 x    Modified hughston- left shoulder scaption with palm down and with thumb up with 2# 1 10 each x    Modified hughston - left shoulder horizontal abduction with palm down and with thumb up with 2# 1 10 each x    Blue theraband - riivalid 1 20 in each direction x    Blue theraband - jonas with left UE 1 20 in each direction x    Body blade with left UE  1 minute each plane x Down at side, at 90 abduction, at 90 flexion, overhead   biodex at 50 speed x 3 minutes forward and 3 minutes backward   x    Roll-over stretch onto left side at 3 different degrees of abduction  3 each  Reported stretch at left posterior capsule   Pulleys for shoulder flexion 1 15     Standing sleeper stretch for left shoulder IR 1 10     Inferior capsule stretch  5  10 second hold, increased flexion after   Towel slide up the back for IR  5     Scapular retraction and protraction with hands behind the head  15     Wall slides in flexion 1 10  10 second hold   Hands clasped or dowel IR up the back 1 10 with a 5 second hold Activities Time    Notes/Comments                         Specific Interventions Next Treatment: strengthening    Activity/Treatment Tolerance:  [x]  Patient tolerated treatment well  []  Patient limited by fatigue  []  Patient limited by pain   []  Patient limited by other medical complications  []  Other:     Assessment:   Progressing toward goals nicely. Tolerated increase reps without difficulty. GOALS:  Patient Goal: To have more motion and strength in my left arm. Short Term Goals:  Time Frame: NO NEW STG FORMULATED DUE TO LIMITED ELOS    Long Term Goals:  Time Frame: 10 weeks  1. Be independent with HEP as instructed to increase his ability to eventually pull back bow with left hand. 2.  Increase MMT of left shoulder musculature to 5/5 to increase his ability to complete his normal activities without difficulty. Patient Education:   [x]  HEP/Education Completed: To increase reps with theraband exercises  []  No new Education completed  []  Reviewed Prior HEP      [x]  Patient verbalized and/or demonstrated understanding of education provided. []  Patient unable to verbalize and/or demonstrate understanding of education provided. Will continue education. [x]  Barriers to learning: slight memory issues    PLAN:    []  Plan of care initiated. [x]  Continue with current plan of care  []  Modify plan of care as follows: See patient 2 x more this week and then 1 x week x 9 weeks from 1/13/21  []  Hold pending physician visit  []  Discharge    Time In 0930   Time Out 1000       Timed Code Minutes: Minutes Units   ADL (19985)     Aquatics (53476)     Manual Therapy (18918)     Neuro (13921)     Th. Activities (88903)     Th. Exercise (63279) 30 2   Wheelchair Mgmt (62581)     Cognitive Function (1st 15 min - 10583)     Cognitive Function (per sub.  15 min - 55677)     Ultrasound (88737)     Ionto (97378)     Manual E-Stim (40688)          TOTAL TREATMENT TIME: 30 minutes     Raji Silverman OTR/L #[id number]

## 2021-02-03 ENCOUNTER — HOSPITAL ENCOUNTER (OUTPATIENT)
Dept: OCCUPATIONAL THERAPY | Age: 71
Setting detail: THERAPIES SERIES
Discharge: HOME | End: 2021-02-03
Payer: MEDICARE

## 2021-02-03 PROCEDURE — 97110 THERAPEUTIC EXERCISES: CPT

## 2021-02-03 NOTE — DISCHARGE SUMMARY
[address]  [] EVALUATION  [] DAILY NOTE (LAND) [] DAILY NOTE (AQUATIC ) [] PROGRESS NOTE [x] DISCHARGE NOTE    [] 615 [facility]   [] [address]    [x] [address] [facility] [place]   [] Doc Has    Date: 2/3/2021  Patient Name:  Claudine Galeazzi  : [birth year]  MRN: [id number]  CSN: [id number]    Referring Practitioner Mayford Cooks, MD   Diagnosis LEFT FROZEN SHOULDER    Treatment Diagnosis Decreased ROM left shoulder   Date of Evaluation 20      Functional Outcome Measure Used UEFS   Functional Outcome Score 69/80 (20) ; 80/80 (2/3/21)      Insurance: Primary: Payor: Linda Fontana /  /  / ,   Secondary: Mata Lawrence Information: No ionto, no hot/cold packs   Visit # 15   Visits Allowed: Unlimited based on medical necessity   Recertification Date: 3/55/22   Physician Follow-Up: No follow up with physician at current time   Physician Orders: Aggressive OT eval and treat - ROM 5 x week x 3 weeks, then 3 x week x 3 weeks   Pertinent History: Patient reports that his left shoulder started bothering him about 18 months ago with certain activities. States that he had the most pain with archery activities. States that he had an injection in 2019 and he did well for about a year. States that he went back to the physician this fall after he climbed a ladder and had some pain in his left shoulder. It was determined to have an injection with ultrasound guidance - had this done on 20. Patient states that he had COVID in September and was hospitalized. States that he had high level steroid treatments as inpatient and then had oral steroids for a couple weeks after discharge from hospital. States that then he an autoimmune flare up (had similar episode in  and has been taking celcept). States that he is now taking prednisone and increased amount of celcept.  States that he is not having any pain in the left shoulder due to having quite a bit of steroids in the past few months. Comorbidities include osteopenia that could influence rehab process. SUBJECTIVE: Reports that he is having no difficulty with any activity that he has been doing. States that he did shovel some snow yesterday and didn't have any difficulty. States that he feels as though he is ready to be discharged from formal therapy and verbalized plan to continue with HEP. TREATMENT   Precautions: osteopenia   Pain:  Reports no pain in left shoulder at any time    X in shaded column indicates Activity Completed Today   Modalities Parameters/  Location  Notes/Comments                     Manual Therapy Time/  Technique  Notes/Comments   sidelying left scapular mobilizations      STM to left upper trapezius and rhomboid regions            Exercises   Sets/  Sec Reps  Notes/Comments   Blue theraband - riivalid 1 20 in each direction x    Blue theraband - jonas with left UE 1 20 in each direction x    Body blade with left UE  1 minute each plane x Down at side, at 90 abduction, at 90 flexion, overhead   Activities Time    Notes/Comments                         LEFT UPPER EXTREMITY  STRENGTH    Strength Rating Comments   Shoulder Flexion 5/5    Shoulder Extension 5/5    Shoulder Abduction 5/5    Shoulder Adduction 5/5    Shoulder External Rotation 5/5    Shoulder Internal Rotation 5/5        Specific Interventions Next Treatment: strengthening    Activity/Treatment Tolerance:  [x]  Patient tolerated treatment well  []  Patient limited by fatigue  []  Patient limited by pain   []  Patient limited by other medical complications  []  Other:     Assessment:   Patient has made excellent progress toward goals. Patient is able to complete his HEP without difficulty and good understanding of how to progress HEP after discharge from formal therapy. GOALS:  Patient Goal: To have more motion and strength in my left arm.     Short Term Goals:  Time

## 2021-04-29 ENCOUNTER — HOSPITAL ENCOUNTER (OUTPATIENT)
Dept: AUDIOLOGY | Age: 71
Discharge: HOME | End: 2021-04-29
Payer: MEDICARE

## 2021-04-29 ENCOUNTER — HOSPITAL ENCOUNTER (OUTPATIENT)
Age: 71
Discharge: HOME | End: 2021-04-29
Payer: MEDICARE

## 2021-04-29 DIAGNOSIS — R60.9 EDEMA, UNSPECIFIED TYPE: ICD-10-CM

## 2021-04-29 DIAGNOSIS — E78.2 MIXED HYPERLIPIDEMIA: ICD-10-CM

## 2021-04-29 DIAGNOSIS — Z12.5 SCREENING PSA (PROSTATE SPECIFIC ANTIGEN): ICD-10-CM

## 2021-04-29 LAB
ANION GAP SERPL CALCULATED.3IONS-SCNC: 7 MEQ/L (ref 8–16)
BUN BLDV-MCNC: 19 MG/DL (ref 7–22)
CALCIUM SERPL-MCNC: 9.3 MG/DL (ref 8.5–10.5)
CHLORIDE BLD-SCNC: 106 MEQ/L (ref 98–111)
CHOLESTEROL, TOTAL: 164 MG/DL (ref 100–199)
CO2: 29 MEQ/L (ref 23–33)
CREAT SERPL-MCNC: 0.9 MG/DL (ref 0.4–1.2)
GFR SERPL CREATININE-BSD FRML MDRD: 83 ML/MIN/1.73M2
GLUCOSE BLD-MCNC: 97 MG/DL (ref 70–108)
HDLC SERPL-MCNC: 49 MG/DL
LDL CHOLESTEROL CALCULATED: 100 MG/DL
POTASSIUM SERPL-SCNC: 4.6 MEQ/L (ref 3.5–5.2)
PROSTATE SPECIFIC ANTIGEN: 0.52 NG/ML (ref 0–1)
SODIUM BLD-SCNC: 142 MEQ/L (ref 135–145)
TRIGL SERPL-MCNC: 75 MG/DL (ref 0–199)
TSH SERPL DL<=0.05 MIU/L-ACNC: 1.7 UIU/ML (ref 0.4–4.2)

## 2021-04-29 PROCEDURE — 36415 COLL VENOUS BLD VENIPUNCTURE: CPT

## 2021-04-29 PROCEDURE — 80061 LIPID PANEL: CPT

## 2021-04-29 PROCEDURE — 84443 ASSAY THYROID STIM HORMONE: CPT

## 2021-04-29 PROCEDURE — 80048 BASIC METABOLIC PNL TOTAL CA: CPT

## 2021-04-29 PROCEDURE — G0103 PSA SCREENING: HCPCS

## 2021-04-29 PROCEDURE — 9990000010 HC NO CHARGE VISIT: Performed by: AUDIOLOGIST

## 2021-04-29 NOTE — PROGRESS NOTES
AUDIOLOGICAL EVALUATION      REASON FOR TESTING: The patient reports episodes of hearing loss and tinnitus in the left ear for the past few months. The episodes are accompanied by brief headaches and vertigo as well. The symptoms can last for half of a day and are aggravated by loud noise. He denies any otalgia and aural pressure. The patient was Covid-19 positive in September 2020. 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 OTOSCOPY: WNL for both ears. AUDIOGRAM            Reliability: Good  Audiometer Used:  [id number]    COMMENTS: Normal hearing sensitivity, sloping mild sensorineural hearing loss for the right ear. Normal hearing sensitivity, sloping to moderately-severe sensorineural hearing loss for the left ear. There is an asymmetry at [place] with the left ear being worse. Speech discrimination ability is excellent at 100%, bilaterally. Tympanometry revealed normal peak pressure and normal middle ear compliance for both ears. RECOMMENDATION(S):   1- ENT consult is recommended due to the asymmetrical sensorineural hearing loss. Further testing, such as an MRI of internal auditory canal, is recommended to rule out a retrocochlear lesion in the asymmetrical hearing loss/tinnitus/vertigo. 2- Upon further testing/medical clearance, binaural amplification could be considered. 3- Repeat audiogram in six months for monitoring purposes or sooner if any changes are noted in hearing ability.

## 2021-05-26 ENCOUNTER — TELEPHONE (OUTPATIENT)
Dept: ENT CLINIC | Age: 71
End: 2021-05-26

## 2021-05-26 ENCOUNTER — OFFICE VISIT (OUTPATIENT)
Dept: ENT CLINIC | Age: 71
End: 2021-05-26
Payer: MEDICARE

## 2021-05-26 VITALS
SYSTOLIC BLOOD PRESSURE: 120 MMHG | BODY MASS INDEX: 26.57 KG/M2 | DIASTOLIC BLOOD PRESSURE: 84 MMHG | HEART RATE: 60 BPM | TEMPERATURE: 96.7 F | RESPIRATION RATE: 16 BRPM | WEIGHT: 190.5 LBS

## 2021-05-26 DIAGNOSIS — R42 DIZZINESS: ICD-10-CM

## 2021-05-26 DIAGNOSIS — H93.12 LEFT-SIDED TINNITUS: ICD-10-CM

## 2021-05-26 DIAGNOSIS — H91.8X9 ASYMMETRICAL HEARING LOSS: Primary | ICD-10-CM

## 2021-05-26 PROCEDURE — 3017F COLORECTAL CA SCREEN DOC REV: CPT | Performed by: NURSE PRACTITIONER

## 2021-05-26 PROCEDURE — 4040F PNEUMOC VAC/ADMIN/RCVD: CPT | Performed by: NURSE PRACTITIONER

## 2021-05-26 PROCEDURE — 99204 OFFICE O/P NEW MOD 45 MIN: CPT | Performed by: NURSE PRACTITIONER

## 2021-05-26 PROCEDURE — G8427 DOCREV CUR MEDS BY ELIG CLIN: HCPCS | Performed by: NURSE PRACTITIONER

## 2021-05-26 PROCEDURE — 1036F TOBACCO NON-USER: CPT | Performed by: NURSE PRACTITIONER

## 2021-05-26 PROCEDURE — G8417 CALC BMI ABV UP PARAM F/U: HCPCS | Performed by: NURSE PRACTITIONER

## 2021-05-26 PROCEDURE — 1123F ACP DISCUSS/DSCN MKR DOCD: CPT | Performed by: NURSE PRACTITIONER

## 2021-05-26 NOTE — PROGRESS NOTES
[facility] [place] [facility]  [facility] EAR, NOSE AND THROAT  [address] [place] [address] [place] [address]  Dept: [phone]  Dept Fax: [phone]  Loc: [phone]    Yariel Mahoney is a 66 y.o. male who was referred by No ref. provider found for:  Chief Complaint   Patient presents with    Ear Problem     New patient here with left ear problem. HPI:     Yariel Mahoney is a 66 y.o. male new patient here for evaluation of left ear issues. Patient reports onset of increased left tinnitus and decreased hearing at beginning of March 2021. He also experienced headaches that were brief with episodes of true vertigo; these episodes seem to have been triggered with loud noise, but have decreased in frequency and severity at this time. 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 He initially thought there may be a cerumen issue, so used Debrox without any improvement. He denies any upper respiratory issues or other neurological symptoms. He did have COVID -19 infection in September 2020. Was seen with PCP, Ron Pleitez MD, on 4/27/2021; notes reviewed - normal ear exam, recommend Audiogram.  Audiogram completed 4/29/2021 reviewed; normal hearing sloping to mild SNHL right ear, normal hearing sloping to mod-severe SNHL left ear with asymmetry 6-8KHz, % bilaterally, and normal tympanograms. Follows with Dermatology at [facility] for chronic dermatitis; on long-term Cellcept therapy and topicals for flare-ups. Some noise exposure over the years with playing the drums; inconsistent use of hearing protection. No prior ENT issues. History:      Allergies   Allergen Reactions    Sulfa Antibiotics      Current Outpatient Medications   Medication Sig Dispense Refill    predniSONE (DELTASONE) 10 MG tablet 6 tab daily (60mg) x3 days, 4 tab daily (40mg) x3 days, 3 tab daily (30mg) x3 days, 2 tab daily (20mg) x3 days, 1 tab daily (10mg) x3 days 48 tablet 0    mycophenolate (CELLCEPT) 500 MG tablet Take 500 mg by mouth daily      Calcium Carbonate-Vitamin D (OYSTER SHELL CALCIUM/D) 500-200 MG-UNIT TABS Take 1 tablet by mouth daily      aspirin 81 MG chewable tablet Take 1 tablet by mouth daily 30 tablet 0    fluticasone (FLONASE) 50 MCG/ACT nasal spray 1 spray by Each Nare route daily 1 Bottle 0    acetaminophen (TYLENOL) 500 MG tablet Take 1,000 mg by mouth every 6 hours as needed for Pain      cetirizine (ZYRTEC) 10 MG tablet Take 10 mg by mouth daily      simvastatin (ZOCOR) 20 MG tablet Take 20 mg by mouth nightly      omeprazole (PRILOSEC) 20 MG delayed release capsule Take 1 capsule by mouth Daily 90 capsule 0    simvastatin (ZOCOR) 40 MG tablet Take 1 tablet by mouth nightly NO REFILLS DUE FOR CHECK UP AFTER 07- 90 tablet 0     No current facility-administered medications for this visit. Past Medical History:   Diagnosis Date    GERD (gastroesophageal reflux disease)     Hyperlipidemia       Past Surgical History:   Procedure Laterality Date    KNEE ARTHROSCOPY      left    NECK SURGERY      burs removed     History reviewed. No pertinent family history. Social History     Tobacco Use    Smoking status: Never Smoker    Smokeless tobacco: Never Used   Substance Use Topics    Alcohol use: Yes     Alcohol/week: 1.0 standard drinks     Types: 1 Glasses of wine per week        Subjective:      Review of Systems  Rest of review of systems are negative, except as noted in HPI. Objective:     /84 (Site: Left Upper Arm, Position: Sitting)   Pulse 60   Temp 96.7 °F (35.9 °C) (Infrared)   Resp 16   Wt 190 lb 8 oz (86.4 kg)   BMI 26.57 kg/m²     PHYSICAL EXAM  Constitutional: Oriented to person, place, and time. appears well-developed and well-nourished. No distress. HENT:   Head: Normocephalic and atraumatic. Right Ear:  External ear normal. Tympanic membrane intact. Middle ear aerated.     Left Ear:  External ear normal. Tympanic membrane intact. Middle ear aerated. Nose:  External nose normal. Nasal mucosa normal. No lesions noted. Mouth/Throat:  Fair dentition. Oral cavity mucosa normal, no masses or lesions noted. Oropharynx is clear and moist.   Eyes:  Pupils are equal, round, and reactive to light. Conjunctivae and EOM are normal.   Neck:  Normal range of motion. Neck supple. No JVD present. No tracheal deviation present. No thyromegaly present. No cervical lymphadenopathy noted. Cardiovascular:  Normal rate. Pulmonary/Chest:  Effort normal. No stridor or stertor. No respiratory distress. Musculoskeletal:  Normal range of motion. No edema or lymphadenopathy. Neurological:  Alert and oriented to person, place, and time. Cranial nerve II-XII grossly intact. Skin:  Skin is warm. No erythema. Psychiatric:  Normal mood and affect. Behavior is normal.     Vitals reviewed. Data:  All of the past medical history, past surgical history, family history,social history, allergies and current medications were reviewed with the patient.                 Assessment & Plan   Diagnoses and all orders for this visit:     Diagnosis Orders   1. Asymmetrical hearing loss, left  predniSONE (DELTASONE) 10 MG tablet   2. Left-sided tinnitus  MRI BRAIN W WO CONTRAST    predniSONE (DELTASONE) 10 MG tablet   3. Dizziness  MRI BRAIN W WO CONTRAST    predniSONE (DELTASONE) 10 MG tablet       The findings were explained and his questions were answered. We discussed potential causes of left asymmetrical SNHL and associated symptoms; research studies in this area of interest suggests viral cause, but also need to rule out retrocochlear pathologies. Recommend we proceed with MRI brain W WO and attention to IACs. If there are no findings, which is what we would hope for, then will start high-dose prednisone taper if okay with patient's Dermatologist at [facility].   Patient also understands that ENT specialties are seeing presentations of

## 2021-05-26 NOTE — TELEPHONE ENCOUNTER
Chucho Beard is scheduled for an MRI brain with and without contrast 6/11/21. Chucho Beard needs to have his creatinine levels checked. Please place the order.

## 2021-06-01 NOTE — TELEPHONE ENCOUNTER
Jackie Villagomez called back and I advised him that he needed to get the lab done prior to his MRI.

## 2021-06-02 ENCOUNTER — HOSPITAL ENCOUNTER (OUTPATIENT)
Age: 71
Discharge: HOME | End: 2021-06-02
Payer: MEDICARE

## 2021-06-02 DIAGNOSIS — R42 DIZZINESS: ICD-10-CM

## 2021-06-02 DIAGNOSIS — H93.12 LEFT-SIDED TINNITUS: ICD-10-CM

## 2021-06-02 LAB
CREAT SERPL-MCNC: 0.9 MG/DL (ref 0.4–1.2)
GFR SERPL CREATININE-BSD FRML MDRD: 83 ML/MIN/1.73M2

## 2021-06-02 PROCEDURE — 36415 COLL VENOUS BLD VENIPUNCTURE: CPT

## 2021-06-02 PROCEDURE — 82565 ASSAY OF CREATININE: CPT

## 2021-06-11 ENCOUNTER — HOSPITAL ENCOUNTER (OUTPATIENT)
Dept: MRI IMAGING | Age: 71
Discharge: HOME | End: 2021-06-11
Payer: MEDICARE

## 2021-06-11 ENCOUNTER — HOSPITAL ENCOUNTER (OUTPATIENT)
Age: 71
Discharge: HOME | End: 2021-06-11
Payer: MEDICARE

## 2021-06-11 DIAGNOSIS — H93.12 LEFT-SIDED TINNITUS: ICD-10-CM

## 2021-06-11 DIAGNOSIS — R42 DIZZINESS: ICD-10-CM

## 2021-06-11 PROCEDURE — A9579 GAD-BASE MR CONTRAST NOS,1ML: HCPCS | Performed by: NURSE PRACTITIONER

## 2021-06-11 PROCEDURE — 70553 MRI BRAIN STEM W/O & W/DYE: CPT

## 2021-06-11 PROCEDURE — 6360000004 HC RX CONTRAST MEDICATION: Performed by: NURSE PRACTITIONER

## 2021-06-11 RX ADMIN — GADOTERIDOL 20 ML: 279.3 INJECTION, SOLUTION INTRAVENOUS at 10:21

## 2021-06-14 ENCOUNTER — TELEPHONE (OUTPATIENT)
Dept: ENT CLINIC | Age: 71
End: 2021-06-14

## 2021-06-14 RX ORDER — PREDNISONE 10 MG/1
TABLET ORAL
Qty: 48 TABLET | Refills: 0 | Status: SHIPPED | OUTPATIENT
Start: 2021-06-14 | End: 2021-12-22

## 2021-06-14 NOTE — TELEPHONE ENCOUNTER
MRI negative for any pathology that would cause the asymmetrical left SNHL. Recommend steroid taper - patient agreeable. His doctor at [facility] is okay with oral steroids. Will touch base with patient in 2 weeks to check on symptoms.

## 2021-06-14 NOTE — TELEPHONE ENCOUNTER
Kayden Gannon called to receive the results of his MRI brain with and without contrast. CADE Cobb will call him back with the results.

## 2021-06-29 ENCOUNTER — TELEPHONE (OUTPATIENT)
Dept: ENT CLINIC | Age: 71
End: 2021-06-29

## 2021-06-29 DIAGNOSIS — H91.8X9 ASYMMETRICAL HEARING LOSS: ICD-10-CM

## 2021-06-29 DIAGNOSIS — H93.12 LEFT-SIDED TINNITUS: Primary | ICD-10-CM

## 2021-06-29 NOTE — TELEPHONE ENCOUNTER
Spoke with patient and he stated at this time he would like to wait and see. He stated he would like to keep this option just incase it gets worse. Patient transferred up to Vinay Theodore to schedule audio.

## 2021-06-29 NOTE — TELEPHONE ENCOUNTER
Patient should be completing 14 day steroid taper. If no improvement in left hearing or ringing, we can either get him in with a physician to discuss if transtympanic steroid injection is appropriate (to potentially help the ringing and improve hearing - we would discuss this in detail with him) or we can observe and repeat the hearing test in 6 months from the last one (would schedule in October with Lavon Adame) to see if we get any improvement with time. Regardless, would want repeat Audio at 6 months. I placed order.

## 2021-06-29 NOTE — TELEPHONE ENCOUNTER
Louann Bradshaw left for the day, I informed the patient that we will call him back Thursday 7/1/21 to schedule his six month hearing test.

## 2021-09-13 ENCOUNTER — OFFICE VISIT (OUTPATIENT)
Dept: CARDIOLOGY CLINIC | Age: 71
End: 2021-09-13
Payer: MEDICARE

## 2021-09-13 VITALS
DIASTOLIC BLOOD PRESSURE: 80 MMHG | HEIGHT: 71 IN | SYSTOLIC BLOOD PRESSURE: 152 MMHG | WEIGHT: 189.8 LBS | BODY MASS INDEX: 26.57 KG/M2 | HEART RATE: 64 BPM

## 2021-09-13 DIAGNOSIS — R06.02 SOB (SHORTNESS OF BREATH) ON EXERTION: Primary | ICD-10-CM

## 2021-09-13 PROCEDURE — 93000 ELECTROCARDIOGRAM COMPLETE: CPT | Performed by: INTERNAL MEDICINE

## 2021-09-13 PROCEDURE — 1036F TOBACCO NON-USER: CPT | Performed by: INTERNAL MEDICINE

## 2021-09-13 PROCEDURE — G8417 CALC BMI ABV UP PARAM F/U: HCPCS | Performed by: INTERNAL MEDICINE

## 2021-09-13 PROCEDURE — 99204 OFFICE O/P NEW MOD 45 MIN: CPT | Performed by: INTERNAL MEDICINE

## 2021-09-13 PROCEDURE — 1123F ACP DISCUSS/DSCN MKR DOCD: CPT | Performed by: INTERNAL MEDICINE

## 2021-09-13 PROCEDURE — 4040F PNEUMOC VAC/ADMIN/RCVD: CPT | Performed by: INTERNAL MEDICINE

## 2021-09-13 PROCEDURE — 3017F COLORECTAL CA SCREEN DOC REV: CPT | Performed by: INTERNAL MEDICINE

## 2021-09-13 PROCEDURE — G8427 DOCREV CUR MEDS BY ELIG CLIN: HCPCS | Performed by: INTERNAL MEDICINE

## 2021-09-13 RX ORDER — ASPIRIN 81 MG/1
81 TABLET ORAL DAILY
Qty: 90 TABLET | Refills: 1 | Status: SHIPPED | OUTPATIENT
Start: 2021-09-13

## 2021-09-13 RX ORDER — TRIAMCINOLONE ACETONIDE 0.25 MG/G
OINTMENT TOPICAL
COMMUNITY
Start: 2021-06-10 | End: 2022-09-26

## 2021-09-13 NOTE — PROGRESS NOTES
[address] [facility] [address]  [place] [address]  Dept: [phone]  Dept Fax: [phone]  Loc: [phone]    Visit Date: 9/13/2021    Mr. Tong Moran is a 66 y.o. male  who presented for:  Chief Complaint   Patient presents with    New Patient       HPI:   65 yo M c hx of HLD, HTN, GERD is referred here for abnormal calcium score. Patient states he underwent CT coronary calcium scoring at [facility]. He had a total calcium score of 358. EKG shows NSR, no ST-T changes. LDL is 100. He had COVID19 in 09/2020. He is on mycophenolate for allergic reaction. States he was on calcium scoring. Father with likely aortic stenosis.          Current Outpatient Medications:     triamcinolone (KENALOG) 0.025 % ointment, , Disp: , Rfl:     Vitamin D (CHOLECALCIFEROL) 25 MCG (1000 UT) TABS tablet, Take 1,000 Units by mouth daily, Disp: , Rfl:     omeprazole (PRILOSEC) 20 MG delayed release capsule, Take 1 capsule by mouth Daily, Disp: 90 capsule, Rfl: 3    simvastatin (ZOCOR) 40 MG tablet, Take 1 tablet by mouth nightly NO REFILLS DUE FOR CHECK UP AFTER 07-, Disp: 90 tablet, Rfl: 3    zoster recombinant adjuvanted vaccine (SHINGRIX) 50 MCG/0.5ML SUSR injection, Inject 0.5 mLs into the muscle See Admin Instructions 1 dose now and repeat in 2-6 months, Disp: 0.5 mL, Rfl: 0    predniSONE (DELTASONE) 10 MG tablet, 6 tab daily (60mg) x3 days, 4 tab daily (40mg) x3 days, 3 tab daily (30mg) x3 days, 2 tab daily (20mg) x3 days, 1 tab daily (10mg) x3 days (Patient taking differently: Take 10 mg by mouth daily as needed ), Disp: 48 tablet, Rfl: 0    mycophenolate (CELLCEPT) 500 MG tablet, Take 500 mg by mouth daily, Disp: , Rfl:     Calcium Carbonate-Vitamin D (OYSTER SHELL CALCIUM/D) 500-200 MG-UNIT TABS, Take 1 tablet by mouth daily, Disp: , Rfl:     acetaminophen (TYLENOL) 500 MG tablet, Take 1,000 mg by mouth every 6 hours as needed for Pain, Disp: , Rfl:     cetirizine (ZYRTEC) 10 MG tablet, Take 10 mg by mouth daily, Disp: , Rfl:     Past Medical History  Viki Martínez  has a past medical history of COVID-19, GERD (gastroesophageal reflux disease), and Hyperlipidemia. Social History  Viki Martínez  reports that he has never smoked. He has never used smokeless tobacco. He reports current alcohol use of about 1.0 standard drinks of alcohol per week. He reports that he does not use drugs. Family History  Viki Martínez family history is not on file. Past Surgical History   Past Surgical History:   Procedure Laterality Date    KNEE ARTHROSCOPY      left    NECK SURGERY      burs removed       Subjective:     REVIEW OF SYSTEMS  Constitutional: denies sweats, chills and fever  HENT: denies  congestion, sinus pressure, sneezing and sore throat. Eyes: denies  pain, discharge, redness and itching. Respiratory: denies apnea, cough  Gastrointestinal: denies blood in stool, constipation, diarrhea   Endocrine: denies cold intolerance, heat intolerance, polydipsia. Genitourinary: denies dysuria, enuresis, flank pain and hematuria. Musculoskeletal: denies arthralgias, joint swelling and neck pain. Neurological: denies numbness and headaches. Psychiatric/Behavioral: denies agitation, confusion, decreased concentration and dysphoric mood    All others reviewed and are negative. Objective:     BP (!) 152/80   Pulse 64   Ht 5' 11\" (1.803 m)   Wt 189 lb 12.8 oz (86.1 kg)   BMI 26.47 kg/m²     Wt Readings from Last 3 Encounters:   09/13/21 189 lb 12.8 oz (86.1 kg)   08/25/21 188 lb (85.3 kg)   05/26/21 190 lb 8 oz (86.4 kg)     BP Readings from Last 3 Encounters:   09/13/21 (!) 152/80   08/25/21 (!) 140/72   05/26/21 120/84       PHYSICAL EXAM  Constitutional: Oriented to person, place, and time. Appears well-developed and well-nourished. HENT:   Head: Normocephalic and atraumatic. Eyes: EOM are normal. Pupils are equal, round, and reactive to light.    Neck: Normal range of motion. Neck supple. No JVD present. Cardiovascular: Normal rate , normal heart sounds and intact distal pulses. Pulmonary/Chest: Effort normal and breath sounds normal. No respiratory distress. No wheezes. No rales. Abdominal: Soft. Bowel sounds are normal. No distension. There is no tenderness. Musculoskeletal: Normal range of motion. No edema. Neurological: Alert and oriented to person, place, and time. No cranial nerve deficit. Coordination normal.   Skin: Skin is warm and dry. Psychiatric: Normal mood and affect. No results found for: CKTOTAL, CKMB, CKMBINDEX    Lab Results   Component Value Date    WBC 8.0 12/09/2020    RBC 4.55 12/09/2020    HGB 14.7 12/09/2020    HCT 44.7 12/09/2020    MCV 98.2 12/09/2020    MCH 32.3 12/09/2020    MCHC 32.9 12/09/2020    RDW 12.7 09/19/2020     12/09/2020    MPV 9.8 12/09/2020       Lab Results   Component Value Date     04/29/2021    K 4.6 04/29/2021    K 4.8 09/26/2020     04/29/2021    CO2 29 04/29/2021    BUN 19 04/29/2021    LABALBU 4.3 12/09/2020    CREATININE 0.9 06/02/2021    CALCIUM 9.3 04/29/2021    LABGLOM 83 06/02/2021    GLUCOSE 97 04/29/2021       Lab Results   Component Value Date    ALKPHOS 74 12/09/2020    ALT 29 12/09/2020    AST 24 12/09/2020    PROT 6.6 12/09/2020    BILITOT 0.6 12/09/2020    BILIDIR 0.3 09/21/2020    LABALBU 4.3 12/09/2020       No results found for: MG    Lab Results   Component Value Date    INR 1.17 (H) 09/22/2020    INR 1.19 (H) 09/21/2020    INR 1.11 09/20/2020         No results found for: LABA1C    Lab Results   Component Value Date    TRIG 75 04/29/2021    HDL 49 04/29/2021    LDLCALC 100 04/29/2021       Lab Results   Component Value Date    TSH 1.700 04/29/2021         Testing Reviewed:      I haveindividually reviewed the below cardiac tests    EKG:    ECHO: No results found for this or any previous visit. STRESS:    CATH:    Assessment/Plan       Diagnosis Orders   1.  SOB (shortness of breath) on exertion  EKG 12 lead     Abnormal coronary calcium score  COVID19 in 09/2020  Fatigue  HTN  HLD    Will get Echo and cardiolite stress test  Continue Aspirin, statin  Continue rest of the management  Reviewed eKG, NSR  Follow up after testing   The patient is asked to make an attempt to improve diet and exercise patterns to aid in medical management of this problem. Advised more plant based nutrition/meditarrean diet   Advised patient to call office or seek immediate medical attention if there is any new onset of  any chest pain, sob, palpitations, lightheadedness, dizziness, orthopnea, PND or pedal edema. All medication side effects were discussed in details. Thank youfor allowing me to participate in the care of this patient. Please do not hesitate to contact me for any further questions. Return in about 3 months (around 12/13/2021), or if symptoms worsen or fail to improve, for Regular follow up, Review testing.        Electronically signed by She Ferguson MD [facility]  9/13/2021 at 2:29 PM EDT

## 2021-09-24 ENCOUNTER — HOSPITAL ENCOUNTER (OUTPATIENT)
Dept: NON INVASIVE DIAGNOSTICS | Age: 71
Discharge: HOME | End: 2021-09-24
Payer: MEDICARE

## 2021-09-24 DIAGNOSIS — R06.02 SOB (SHORTNESS OF BREATH) ON EXERTION: ICD-10-CM

## 2021-09-24 LAB
LV EF: 65 %
LV EF: 65 %
LVEF MODALITY: NORMAL
LVEF MODALITY: NORMAL

## 2021-09-24 PROCEDURE — 93017 CV STRESS TEST TRACING ONLY: CPT | Performed by: INTERNAL MEDICINE

## 2021-09-24 PROCEDURE — 93306 TTE W/DOPPLER COMPLETE: CPT

## 2021-09-24 PROCEDURE — 78452 HT MUSCLE IMAGE SPECT MULT: CPT

## 2021-09-24 PROCEDURE — A9500 TC99M SESTAMIBI: HCPCS | Performed by: INTERNAL MEDICINE

## 2021-09-24 PROCEDURE — 3430000000 HC RX DIAGNOSTIC RADIOPHARMACEUTICAL: Performed by: INTERNAL MEDICINE

## 2021-09-24 RX ADMIN — Medication 9.6 MILLICURIE: at 09:32

## 2021-09-24 RX ADMIN — Medication 31.5 MILLICURIE: at 10:40

## 2021-10-02 NOTE — TELEPHONE ENCOUNTER
A message was left for Kurt Milian to call [facility]. Right arm;

## 2021-11-11 ENCOUNTER — HOSPITAL ENCOUNTER (OUTPATIENT)
Age: 71
Discharge: HOME | End: 2021-11-11
Payer: MEDICARE

## 2021-11-11 LAB
ANION GAP SERPL CALCULATED.3IONS-SCNC: 11 MEQ/L (ref 8–16)
BASOPHILS # BLD: 0.9 %
BASOPHILS ABSOLUTE: 0 THOU/MM3 (ref 0–0.1)
BUN BLDV-MCNC: 13 MG/DL (ref 7–22)
CALCIUM SERPL-MCNC: 9.7 MG/DL (ref 8.5–10.5)
CHLORIDE BLD-SCNC: 104 MEQ/L (ref 98–111)
CO2: 28 MEQ/L (ref 23–33)
CREAT SERPL-MCNC: 0.9 MG/DL (ref 0.4–1.2)
EOSINOPHIL # BLD: 4.5 %
EOSINOPHILS ABSOLUTE: 0.2 THOU/MM3 (ref 0–0.4)
ERYTHROCYTE [DISTWIDTH] IN BLOOD BY AUTOMATED COUNT: 12.9 % (ref 11.5–14.5)
ERYTHROCYTE [DISTWIDTH] IN BLOOD BY AUTOMATED COUNT: 47.1 FL (ref 35–45)
GFR SERPL CREATININE-BSD FRML MDRD: 83 ML/MIN/1.73M2
GLUCOSE BLD-MCNC: 102 MG/DL (ref 70–108)
HCT VFR BLD CALC: 45.8 % (ref 42–52)
HEMOGLOBIN: 14.4 GM/DL (ref 14–18)
IMMATURE GRANS (ABS): 0.01 THOU/MM3 (ref 0–0.07)
IMMATURE GRANULOCYTES: 0.2 %
LYMPHOCYTES # BLD: 26.1 %
LYMPHOCYTES ABSOLUTE: 1.4 THOU/MM3 (ref 1–4.8)
MCH RBC QN AUTO: 31.4 PG (ref 26–33)
MCHC RBC AUTO-ENTMCNC: 31.4 GM/DL (ref 32.2–35.5)
MCV RBC AUTO: 99.8 FL (ref 80–94)
MONOCYTES # BLD: 11.9 %
MONOCYTES ABSOLUTE: 0.6 THOU/MM3 (ref 0.4–1.3)
NUCLEATED RED BLOOD CELLS: 0 /100 WBC
PLATELET # BLD: 278 THOU/MM3 (ref 130–400)
PMV BLD AUTO: 9.7 FL (ref 9.4–12.4)
POTASSIUM SERPL-SCNC: 4.4 MEQ/L (ref 3.5–5.2)
RBC # BLD: 4.59 MILL/MM3 (ref 4.7–6.1)
SEG NEUTROPHILS: 56.4 %
SEGMENTED NEUTROPHILS ABSOLUTE COUNT: 3 THOU/MM3 (ref 1.8–7.7)
SODIUM BLD-SCNC: 143 MEQ/L (ref 135–145)
WBC # BLD: 5.3 THOU/MM3 (ref 4.8–10.8)

## 2021-11-11 PROCEDURE — 36415 COLL VENOUS BLD VENIPUNCTURE: CPT

## 2021-11-11 PROCEDURE — 80048 BASIC METABOLIC PNL TOTAL CA: CPT

## 2021-11-11 PROCEDURE — 85025 COMPLETE CBC W/AUTO DIFF WBC: CPT

## 2021-12-17 ENCOUNTER — HOSPITAL ENCOUNTER (OUTPATIENT)
Dept: AUDIOLOGY | Age: 71
Discharge: HOME | End: 2021-12-17
Payer: MEDICARE

## 2021-12-17 PROCEDURE — 92557 COMPREHENSIVE HEARING TEST: CPT | Performed by: AUDIOLOGIST

## 2021-12-17 PROCEDURE — 92567 TYMPANOMETRY: CPT | Performed by: AUDIOLOGIST

## 2021-12-17 NOTE — PROGRESS NOTES
AUDIOLOGICAL EVALUATION      REASON FOR TESTING:  Audiometric evaluation per the request of Liliana ARREAGA, due to the diagnosis of left-sided tinnitus and asymmetrical hearing loss. Repeat audiometry and tympanometry due to the identification of asymmetrical high frequency sensorineural hearing loss on 4/29/21 audiogram. Prior to his last audiogram, the patient experienced months of episodic hearing loss and tinnitus in his left ear. He noted brief headaches and vertigo at that time as well. He continues to experiences tinnitus, which seems to be less frequent. In addition, he notes that the vertigo has resolved. He denies any otalgia and aural pressure. The patient was Covid-19 positive in September of 2020. 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 OTOSCOPY: WNL for both ears. AUDIOGRAM        Reliability: Good  Audiometer Used:  [id number]    COMMENTS: Normal hearing sensitivity sloping to moderate high frequency sensorineural hearing loss for the left ear. Normal hearing sensitivity sloping to mild high frequency sensorineural hearing loss for the right ear. There is an asymmetry at 4000Hz-8000Hz with the left ear being worse. Thresholds have improved for both ears when compared to the April 2021 audiogram. Word recognition ability is excellent at 100% for both ears. Tympanometry revealed normal peak pressure and normal middle ear compliance for both ears. RECOMMENDATION(S):   1- Today's results will be shared with 79Mitali ARREAGA. 2- Repeat audiogram and tympanogram annually for monitoring purposes or sooner if any changes are noted in hearing ability or tinnitus.

## 2021-12-22 ENCOUNTER — TELEPHONE (OUTPATIENT)
Dept: ENT CLINIC | Age: 71
End: 2021-12-22

## 2021-12-22 ENCOUNTER — OFFICE VISIT (OUTPATIENT)
Dept: CARDIOLOGY CLINIC | Age: 71
End: 2021-12-22
Payer: MEDICARE

## 2021-12-22 VITALS
HEART RATE: 72 BPM | HEIGHT: 71 IN | OXYGEN SATURATION: 97 % | WEIGHT: 189 LBS | BODY MASS INDEX: 26.46 KG/M2 | DIASTOLIC BLOOD PRESSURE: 88 MMHG | SYSTOLIC BLOOD PRESSURE: 144 MMHG

## 2021-12-22 DIAGNOSIS — I25.10 ASCVD (ARTERIOSCLEROTIC CARDIOVASCULAR DISEASE): Primary | ICD-10-CM

## 2021-12-22 DIAGNOSIS — H91.8X9 ASYMMETRICAL HEARING LOSS: ICD-10-CM

## 2021-12-22 DIAGNOSIS — H93.12 LEFT-SIDED TINNITUS: Primary | ICD-10-CM

## 2021-12-22 DIAGNOSIS — R42 DIZZINESS: ICD-10-CM

## 2021-12-22 PROCEDURE — 4040F PNEUMOC VAC/ADMIN/RCVD: CPT | Performed by: INTERNAL MEDICINE

## 2021-12-22 PROCEDURE — 3017F COLORECTAL CA SCREEN DOC REV: CPT | Performed by: INTERNAL MEDICINE

## 2021-12-22 PROCEDURE — 99213 OFFICE O/P EST LOW 20 MIN: CPT | Performed by: INTERNAL MEDICINE

## 2021-12-22 PROCEDURE — G8484 FLU IMMUNIZE NO ADMIN: HCPCS | Performed by: INTERNAL MEDICINE

## 2021-12-22 PROCEDURE — G8417 CALC BMI ABV UP PARAM F/U: HCPCS | Performed by: INTERNAL MEDICINE

## 2021-12-22 PROCEDURE — 1123F ACP DISCUSS/DSCN MKR DOCD: CPT | Performed by: INTERNAL MEDICINE

## 2021-12-22 PROCEDURE — 1036F TOBACCO NON-USER: CPT | Performed by: INTERNAL MEDICINE

## 2021-12-22 PROCEDURE — G8427 DOCREV CUR MEDS BY ELIG CLIN: HCPCS | Performed by: INTERNAL MEDICINE

## 2021-12-22 NOTE — TELEPHONE ENCOUNTER
Called patient and discussed Audiogram results. He has had resolution of his vertigo type symptoms, hearing slightly improved and less tinnitus. Audio shows persistent asymmetry, but improved thresholds for the left ear. He is pleased with his current status. Recommend yearly Audio for surveillance. He is agreeable. Audio ordered.

## 2021-12-22 NOTE — PROGRESS NOTES
[address] [facility] [address] [place] [address]  Dept: [phone]  Dept Fax: [phone]  Loc: [phone]    Visit Date: 12/22/2021    Mr. Grant Salomon is a 66 y.o. male  who presented for:  Chief Complaint   Patient presents with    Check-Up    Shortness of Breath       HPI:   65 yo M c hx of HLD, HTN, GERD is referred here for abnormal calcium score. Patient states he underwent CT coronary calcium scoring at [facility]. He had a total calcium score of 358. EKG shows NSR, no ST-T changes. LDL is 100. He had COVID19 in 09/2020. He is on mycophenolate for allergic reaction. States he was on calcium scoring. Father with likely aortic stenosis. Current Outpatient Medications:     triamcinolone (KENALOG) 0.025 % ointment, , Disp: , Rfl:     aspirin EC 81 MG EC tablet, Take 1 tablet by mouth daily, Disp: 90 tablet, Rfl: 1    Vitamin D (CHOLECALCIFEROL) 25 MCG (1000 UT) TABS tablet, Take 1,000 Units by mouth daily, Disp: , Rfl:     omeprazole (PRILOSEC) 20 MG delayed release capsule, Take 1 capsule by mouth Daily, Disp: 90 capsule, Rfl: 3    simvastatin (ZOCOR) 40 MG tablet, Take 1 tablet by mouth nightly NO REFILLS DUE FOR CHECK UP AFTER 07-, Disp: 90 tablet, Rfl: 3    mycophenolate (CELLCEPT) 500 MG tablet, Take 500 mg by mouth daily, Disp: , Rfl:     Calcium Carbonate-Vitamin D (OYSTER SHELL CALCIUM/D) 500-200 MG-UNIT TABS, Take 1 tablet by mouth daily, Disp: , Rfl:     acetaminophen (TYLENOL) 500 MG tablet, Take 1,000 mg by mouth every 6 hours as needed for Pain, Disp: , Rfl:     cetirizine (ZYRTEC) 10 MG tablet, Take 10 mg by mouth daily, Disp: , Rfl:     Past Medical History  Kimi Smoke  has a past medical history of COVID-19, GERD (gastroesophageal reflux disease), and Hyperlipidemia. Social History  Kimi Smoke  reports that he has never smoked.  He has never used smokeless tobacco. He reports current alcohol use of about 1.0 standard drink of alcohol per week. He reports that he does not use drugs. Family History  Devon Prescott family history is not on file. Past Surgical History   Past Surgical History:   Procedure Laterality Date    KNEE ARTHROSCOPY      left    NECK SURGERY      burs removed       Subjective:     REVIEW OF SYSTEMS  Constitutional: denies sweats, chills and fever  HENT: denies  congestion, sinus pressure, sneezing and sore throat. Eyes: denies  pain, discharge, redness and itching. Respiratory: denies apnea, cough  Gastrointestinal: denies blood in stool, constipation, diarrhea   Endocrine: denies cold intolerance, heat intolerance, polydipsia. Genitourinary: denies dysuria, enuresis, flank pain and hematuria. Musculoskeletal: denies arthralgias, joint swelling and neck pain. Neurological: denies numbness and headaches. Psychiatric/Behavioral: denies agitation, confusion, decreased concentration and dysphoric mood    All others reviewed and are negative. Objective:     BP (!) 144/88   Pulse 72   Ht 5' 10.5\" (1.791 m)   Wt 189 lb (85.7 kg)   SpO2 97%   BMI 26.74 kg/m²     Wt Readings from Last 3 Encounters:   12/22/21 189 lb (85.7 kg)   09/13/21 189 lb 12.8 oz (86.1 kg)   08/25/21 188 lb (85.3 kg)     BP Readings from Last 3 Encounters:   12/22/21 (!) 144/88   09/13/21 (!) 152/80   08/25/21 (!) 140/72       PHYSICAL EXAM  Constitutional: Oriented to person, place, and time. Appears well-developed and well-nourished. HENT:   Head: Normocephalic and atraumatic. Eyes: EOM are normal. Pupils are equal, round, and reactive to light. Neck: Normal range of motion. Neck supple. No JVD present. Cardiovascular: Normal rate , normal heart sounds and intact distal pulses. Pulmonary/Chest: Effort normal and breath sounds normal. No respiratory distress. No wheezes. No rales. Abdominal: Soft. Bowel sounds are normal. No distension. There is no tenderness. Musculoskeletal: Normal range of motion. No edema. Neurological: Alert and oriented to person, place, and time. No cranial nerve deficit. Coordination normal.   Skin: Skin is warm and dry. Psychiatric: Normal mood and affect. No results found for: CKTOTAL, CKMB, CKMBINDEX    Lab Results   Component Value Date    WBC 5.3 11/11/2021    RBC 4.59 11/11/2021    HGB 14.4 11/11/2021    HCT 45.8 11/11/2021    MCV 99.8 11/11/2021    MCH 31.4 11/11/2021    MCHC 31.4 11/11/2021    RDW 12.7 09/19/2020     11/11/2021    MPV 9.7 11/11/2021       Lab Results   Component Value Date     11/11/2021    K 4.4 11/11/2021    K 4.8 09/26/2020     11/11/2021    CO2 28 11/11/2021    BUN 13 11/11/2021    LABALBU 4.3 12/09/2020    CREATININE 0.9 11/11/2021    CALCIUM 9.7 11/11/2021    LABGLOM 83 11/11/2021    GLUCOSE 102 11/11/2021       Lab Results   Component Value Date    ALKPHOS 74 12/09/2020    ALT 29 12/09/2020    AST 24 12/09/2020    PROT 6.6 12/09/2020    BILITOT 0.6 12/09/2020    BILIDIR 0.3 09/21/2020    LABALBU 4.3 12/09/2020       No results found for: MG    Lab Results   Component Value Date    INR 1.17 (H) 09/22/2020    INR 1.19 (H) 09/21/2020    INR 1.11 09/20/2020         No results found for: LABA1C    Lab Results   Component Value Date    TRIG 75 04/29/2021    HDL 49 04/29/2021    LDLCALC 100 04/29/2021       Lab Results   Component Value Date    TSH 1.700 04/29/2021         Testing Reviewed:      I haveindividually reviewed the below cardiac tests    EKG:    ECHO: No results found for this or any previous visit. STRESS:    CATH:    Assessment/Plan       Diagnosis Orders   1.  ASCVD (arteriosclerotic cardiovascular disease)       Abnormal coronary calcium score  COVID19 in 09/2020  Fatigue  HTN  HLD    Reviewed Echo and cardiolite stress test  No ischemia or infarction  Echo showed EF 65%  Continue Aspirin, statin  Continue rest of the management  Reviewed EKG, NSR  Follow up after testing   The patient is asked to make an attempt to improve diet and exercise patterns to aid in medical management of this problem. Advised more plant based nutrition/meditarrean diet   Advised patient to call office or seek immediate medical attention if there is any new onset of  any chest pain, sob, palpitations, lightheadedness, dizziness, orthopnea, PND or pedal edema. All medication side effects were discussed in details. Thank youfor allowing me to participate in the care of this patient. Please do not hesitate to contact me for any further questions. Return in about 1 year (around 12/22/2022), or if symptoms worsen or fail to improve, for Regular follow up, Review testing.        Electronically signed by Morenita Byrd MD [facility]  12/22/2021 at 2:29 PM EDT

## 2022-01-03 NOTE — TELEPHONE ENCOUNTER
Patient is scheduled on 12- for his yearly audiogram.  Letter mailed to patient for this appointment.

## 2022-02-17 ENCOUNTER — HOSPITAL ENCOUNTER (OUTPATIENT)
Dept: AUDIOLOGY | Age: 72
Discharge: HOME | End: 2022-02-17

## 2022-02-17 PROCEDURE — V5264 EAR MOLD/INSERT: HCPCS | Performed by: AUDIOLOGIST

## 2022-03-22 ENCOUNTER — HOSPITAL ENCOUNTER (OUTPATIENT)
Age: 72
Discharge: HOME | End: 2022-03-22
Payer: MEDICARE

## 2022-03-22 LAB
ALBUMIN SERPL-MCNC: 4.3 G/DL (ref 3.5–5.1)
ALP BLD-CCNC: 88 U/L (ref 38–126)
ALT SERPL-CCNC: 21 U/L (ref 11–66)
ANION GAP SERPL CALCULATED.3IONS-SCNC: 9 MEQ/L (ref 8–16)
AST SERPL-CCNC: 22 U/L (ref 5–40)
BASOPHILS # BLD: 0.6 %
BASOPHILS ABSOLUTE: 0 THOU/MM3 (ref 0–0.1)
BILIRUB SERPL-MCNC: 0.5 MG/DL (ref 0.3–1.2)
BILIRUBIN DIRECT: < 0.2 MG/DL (ref 0–0.3)
BUN BLDV-MCNC: 12 MG/DL (ref 7–22)
CALCIUM SERPL-MCNC: 9.1 MG/DL (ref 8.5–10.5)
CHLORIDE BLD-SCNC: 108 MEQ/L (ref 98–111)
CO2: 28 MEQ/L (ref 23–33)
CREAT SERPL-MCNC: 0.9 MG/DL (ref 0.4–1.2)
EOSINOPHIL # BLD: 3.7 %
EOSINOPHILS ABSOLUTE: 0.2 THOU/MM3 (ref 0–0.4)
ERYTHROCYTE [DISTWIDTH] IN BLOOD BY AUTOMATED COUNT: 12.8 % (ref 11.5–14.5)
ERYTHROCYTE [DISTWIDTH] IN BLOOD BY AUTOMATED COUNT: 46.3 FL (ref 35–45)
GFR SERPL CREATININE-BSD FRML MDRD: 83 ML/MIN/1.73M2
GLUCOSE BLD-MCNC: 108 MG/DL (ref 70–108)
HCT VFR BLD CALC: 45.9 % (ref 42–52)
HEMOGLOBIN: 14.6 GM/DL (ref 14–18)
IMMATURE GRANS (ABS): 0.02 THOU/MM3 (ref 0–0.07)
IMMATURE GRANULOCYTES: 0.3 %
LYMPHOCYTES # BLD: 23.2 %
LYMPHOCYTES ABSOLUTE: 1.6 THOU/MM3 (ref 1–4.8)
MCH RBC QN AUTO: 31.6 PG (ref 26–33)
MCHC RBC AUTO-ENTMCNC: 31.8 GM/DL (ref 32.2–35.5)
MCV RBC AUTO: 99.4 FL (ref 80–94)
MONOCYTES # BLD: 12.4 %
MONOCYTES ABSOLUTE: 0.8 THOU/MM3 (ref 0.4–1.3)
NUCLEATED RED BLOOD CELLS: 0 /100 WBC
PLATELET # BLD: 282 THOU/MM3 (ref 130–400)
PMV BLD AUTO: 9.5 FL (ref 9.4–12.4)
POTASSIUM SERPL-SCNC: 4.6 MEQ/L (ref 3.5–5.2)
RBC # BLD: 4.62 MILL/MM3 (ref 4.7–6.1)
SEG NEUTROPHILS: 59.8 %
SEGMENTED NEUTROPHILS ABSOLUTE COUNT: 4 THOU/MM3 (ref 1.8–7.7)
SODIUM BLD-SCNC: 145 MEQ/L (ref 135–145)
TOTAL PROTEIN: 6.9 G/DL (ref 6.1–8)
WBC # BLD: 6.7 THOU/MM3 (ref 4.8–10.8)

## 2022-03-22 PROCEDURE — 80053 COMPREHEN METABOLIC PANEL: CPT

## 2022-03-22 PROCEDURE — 36415 COLL VENOUS BLD VENIPUNCTURE: CPT

## 2022-03-22 PROCEDURE — 82248 BILIRUBIN DIRECT: CPT

## 2022-03-22 PROCEDURE — 85025 COMPLETE CBC W/AUTO DIFF WBC: CPT

## 2022-04-29 ENCOUNTER — HOSPITAL ENCOUNTER (OUTPATIENT)
Age: 72
Discharge: HOME | End: 2022-04-29
Payer: MEDICARE

## 2022-04-29 ENCOUNTER — HOSPITAL ENCOUNTER (OUTPATIENT)
Dept: GENERAL RADIOLOGY | Age: 72
Discharge: HOME | End: 2022-04-29
Payer: MEDICARE

## 2022-04-29 DIAGNOSIS — M54.50 ACUTE LOW BACK PAIN WITHOUT SCIATICA, UNSPECIFIED BACK PAIN LATERALITY: ICD-10-CM

## 2022-04-29 PROCEDURE — 72072 X-RAY EXAM THORAC SPINE 3VWS: CPT

## 2022-05-21 ENCOUNTER — APPOINTMENT (OUTPATIENT)
Dept: CT IMAGING | Age: 72
End: 2022-05-21
Payer: MEDICARE

## 2022-05-21 ENCOUNTER — HOSPITAL ENCOUNTER (EMERGENCY)
Age: 72
Discharge: HOME | End: 2022-05-21
Attending: EMERGENCY MEDICINE
Payer: MEDICARE

## 2022-05-21 VITALS
DIASTOLIC BLOOD PRESSURE: 85 MMHG | SYSTOLIC BLOOD PRESSURE: 148 MMHG | HEART RATE: 59 BPM | RESPIRATION RATE: 14 BRPM | TEMPERATURE: 97.3 F | OXYGEN SATURATION: 97 %

## 2022-05-21 DIAGNOSIS — R42 DIZZINESS: Primary | ICD-10-CM

## 2022-05-21 DIAGNOSIS — H93.12 TINNITUS OF LEFT EAR: ICD-10-CM

## 2022-05-21 LAB
ALBUMIN SERPL-MCNC: 3.5 GM/DL (ref 3.4–5)
ALP BLD-CCNC: 69 U/L (ref 46–116)
ALT SERPL-CCNC: 28 U/L (ref 14–63)
ANION GAP: 11 MEQ/L (ref 8–16)
AST SERPL-CCNC: 24 U/L (ref 15–37)
BASOPHILS # BLD: 0.7 % (ref 0–3)
BILIRUB SERPL-MCNC: 0.6 MG/DL (ref 0.2–1)
BUN BLDV-MCNC: 17 MG/DL (ref 7–18)
CHLORIDE BLD-SCNC: 107 MEQ/L (ref 98–107)
CO2: 25 MEQ/L (ref 21–32)
CREAT SERPL-MCNC: 1.1 MG/DL (ref 0.6–1.3)
EOSINOPHILS RELATIVE PERCENT: 3.1 % (ref 0–4)
GFR, ESTIMATED: 70 ML/MIN/1.73M2
GLUCOSE BLD-MCNC: 89 MG/DL (ref 74–106)
HCT VFR BLD CALC: 39.8 % (ref 42–52)
HEMOGLOBIN: 13.4 GM/DL (ref 14–18)
LYMPHOCYTES # BLD: 24.1 % (ref 15–47)
MCH RBC QN AUTO: 31.5 PG (ref 27–31)
MCHC RBC AUTO-ENTMCNC: 33.7 GM/DL (ref 33–37)
MCV RBC AUTO: 93.3 FL (ref 80–94)
MONOCYTES: 9.7 % (ref 0–12)
PDW BLD-RTO: 13.5 % (ref 11.5–14.5)
PLATELET # BLD: 257 THOU/MM3 (ref 130–400)
PMV BLD AUTO: 6.8 FL (ref 7.4–10.4)
POC CALCIUM: 8.5 MG/DL (ref 8.5–10.1)
POTASSIUM SERPL-SCNC: 4 MEQ/L (ref 3.5–5.1)
RBC # BLD: 4.27 MILL/MM3 (ref 4.7–6.1)
SEGS: 62.4 % (ref 43–75)
SODIUM BLD-SCNC: 143 MEQ/L (ref 136–145)
TOTAL PROTEIN: 6.2 GM/DL (ref 6.4–8.2)
WBC # BLD: 5.9 THOU/MM3 (ref 4.8–10.8)

## 2022-05-21 PROCEDURE — 99284 EMERGENCY DEPT VISIT MOD MDM: CPT

## 2022-05-21 PROCEDURE — 93005 ELECTROCARDIOGRAM TRACING: CPT | Performed by: EMERGENCY MEDICINE

## 2022-05-21 PROCEDURE — 6370000000 HC RX 637 (ALT 250 FOR IP): Performed by: EMERGENCY MEDICINE

## 2022-05-21 PROCEDURE — 80053 COMPREHEN METABOLIC PANEL: CPT

## 2022-05-21 PROCEDURE — 85025 COMPLETE CBC W/AUTO DIFF WBC: CPT

## 2022-05-21 PROCEDURE — 70450 CT HEAD/BRAIN W/O DYE: CPT

## 2022-05-21 RX ORDER — MECLIZINE HYDROCHLORIDE 25 MG/1
25 TABLET ORAL 3 TIMES DAILY PRN
Qty: 15 TABLET | Refills: 0 | Status: SHIPPED | OUTPATIENT
Start: 2022-05-21 | End: 2022-05-26

## 2022-05-21 RX ORDER — MECLIZINE HYDROCHLORIDE CHEWABLE TABLETS 25 MG/1
25 TABLET, CHEWABLE ORAL ONCE
Status: COMPLETED | OUTPATIENT
Start: 2022-05-21 | End: 2022-05-21

## 2022-05-21 RX ADMIN — MECLIZINE HYDROCHLORIDE 25 MG: 25 TABLET, CHEWABLE ORAL at 12:40

## 2022-05-21 ASSESSMENT — PAIN - FUNCTIONAL ASSESSMENT
PAIN_FUNCTIONAL_ASSESSMENT: NONE - DENIES PAIN
PAIN_FUNCTIONAL_ASSESSMENT: NONE - DENIES PAIN

## 2022-05-21 NOTE — ED NOTES
Discharge instructions reviewed with patient and spouse, questions answered. Skin warm and dry, color normal for ethnicity. Remains slightly dizzy at this time. Remains alert and cooperative. Denies further questions or concerns at this time.      Silvio Rojas RN  05/21/22 [id number]

## 2022-05-21 NOTE — ED NOTES
Presents with wife with history of some dizziness that started last evening. Today symptoms returned and patient was leaning on the wall to ambulate at home. Patient is alert and cooperative. Skin warm and dry. Color normal for ethnicity. Denies injury, denies pain. Has been taking fluids and eating. BP normal when wife checked it at home.      Coleen Doe RN  05/21/22 1404

## 2022-05-21 NOTE — ED PROVIDER NOTES
[facility]  [place] [address]  Phone: [address]    Chief Complaint   Patient presents with    Dizziness       HPI    Suzy Youssef is a 67 y.o. male who presents with a complaint. Patient has some dizziness. Started yesterday. Has had some intermittent episodes maybe 3 or 4 since then. He was doing some over head work yesterday using a and implement to clean a ceiling. Subsequently thought it could be related to that. He also got hot at 1 point and was not sure what was causing her symptoms. He denies fall injury or trauma or other issues. He has had some chronic ringing in his left ear for some time. This is since COVID.     PAST MEDICAL HISTORY    Past Medical History:   Diagnosis Date    COVID-19 2020    GERD (gastroesophageal reflux disease)     Hyperlipidemia        SURGICAL HISTORY    Past Surgical History:   Procedure Laterality Date    KNEE ARTHROSCOPY      left    NECK SURGERY      burs removed       CURRENT MEDICATIONS    Current Outpatient Rx   Medication Sig Dispense Refill    meclizine (ANTIVERT) 25 MG tablet Take 1 tablet by mouth 3 times daily as needed for Dizziness 15 tablet 0    predniSONE (DELTASONE) 10 MG tablet 3 tabs per day for 3 days, 2 tabs per day for 3 days, 1 tab per day for 3 days 18 tablet 0    triamcinolone (KENALOG) 0.025 % ointment       aspirin EC 81 MG EC tablet Take 1 tablet by mouth daily 90 tablet 1    Vitamin D (CHOLECALCIFEROL) 25 MCG (1000 UT) TABS tablet Take 1,000 Units by mouth daily      omeprazole (PRILOSEC) 20 MG delayed release capsule Take 1 capsule by mouth Daily 90 capsule 3    simvastatin (ZOCOR) 40 MG tablet Take 1 tablet by mouth nightly NO REFILLS DUE FOR CHECK UP AFTER 07- 90 tablet 3    mycophenolate (CELLCEPT) 500 MG tablet Take 500 mg by mouth daily      Calcium Carbonate-Vitamin D (OYSTER SHELL CALCIUM/D) 500-200 MG-UNIT TABS Take 1 tablet by mouth daily      acetaminophen (TYLENOL) 500 MG tablet Take 1,000 mg by mouth every 6 hours as needed for Pain      cetirizine (ZYRTEC) 10 MG tablet Take 10 mg by mouth daily         ALLERGIES    Allergies   Allergen Reactions    Sulfa Antibiotics        FAMILY HISTORY    No family history on file. SOCIAL HISTORY    Social History     Socioeconomic History    Marital status:      Spouse name: Not on file    Number of children: Not on file    Years of education: Not on file    Highest education level: Not on file   Occupational History    Not on file   Tobacco Use    Smoking status: Never Smoker    Smokeless tobacco: Never Used   Vaping Use    Vaping Use: Never used   Substance and Sexual Activity    Alcohol use: Yes     Alcohol/week: 1.0 standard drink     Types: 1 Glasses of wine per week    Drug use: No    Sexual activity: Yes     Partners: Female   Other Topics Concern    Not on file   Social History Narrative    Not on file     Social Determinants of Health     Financial Resource Strain: Low Risk     Difficulty of Paying Living Expenses: Not hard at all   Food Insecurity: No Food Insecurity    Worried About [address] in the Last Year: Never true    [address]  in the Last Year: Never true   Transportation Needs:     Lack of Transportation (Medical): Not on file    Lack of Transportation (Non-Medical):  Not on file   Physical Activity:     Days of Exercise per Week: Not on file    Minutes of Exercise per Session: Not on file   Stress:     Feeling of Stress : Not on file   Social Connections:     Frequency of Communication with Friends and Family: Not on file    Frequency of Social Gatherings with Friends and Family: Not on file    Attends Gnosticism Services: Not on file    Active Member of Clubs or Organizations: Not on file    Attends Club or Organization Meetings: Not on file    Marital Status: Not on file   Intimate Partner Violence:  Fear of Current or Ex-Partner: Not on file    Emotionally Abused: Not on file    Physically Abused: Not on file    Sexually Abused: Not on file   Housing Stability:     Unable to Pay for Housing in the Last Year: Not on file    Number of Places Lived in the Last Year: Not on file    Unstable Housing in the Last Year: Not on file       REVIEW OF SYSTEMS    Positive for dizziness. No headache neck pain chest pain abdominal pain vomiting diarrhea urinary symptoms or other issues  All systems negative except as marked. PHYSICAL EXAM    VITAL SIGNS: BP (!) 146/95   Pulse 58   Temp 97.3 °F (36.3 °C) (Temporal)   Resp 12   SpO2 97%    Constitutional:  Alert not toxic or ill, younger than stated age  HENT:  normocephalic, Atraumatic,  Bilateral external ears normal, Oropharynx moist, No oral exudates, Nose normal.  Cervical Spine: Normal range of motion,  No stridor. No tenderness, Supple,  Eyes:  No discharge or  Swelling,Conjunctiva normal, PERRL, EOMI,  Respiratory: No respiratory distress, Normal breath sounds,  No wheezing, No chest tenderness. Cardiovascular:  Normal heart rate, Normal rhythm, No murmurs, No rubs, No gallops. GI:  No reproducible pain, Bowel sounds normal, Soft, No masses, No pulsatile masses. No tenderness  Musculoskeletal:  Intact distal pulses, No edema, No tenderness, No cyanosis, No clubbing. Good range of motion in all major joints. No tenderness to palpation or major deformities noted. Back:No tenderness. Integument:  Warm, Dry, No erythema, No rash (on exposed areas)   Lymphatic:  No lymphadenopathy noted. Neurologic:  Alert & oriented x 3, Normal motor function, Normal sensory function, No focal deficits noted. Finger-to-nose is normal no nystagmus with gaze  Psychiatric:  Affect normal, Judgment normal, Mood normal.     EKG    Sinus rate at 61 NH is 180, no ischemia or infarction.                        RADIOLOGY    CT HEAD WO CONTRAST   Final Result    No evidence of acute intracranial abnormality. **This report has been created using voice recognition software. It may contain minor errors which are inherent in voice recognition technology. **      Final report electronically signed by Dr. Delon Lopez MD on 5/21/2022 1:21 PM          PROCEDURES    none      CONSULTS:  None      CRITICAL CARE:  None    SCREENINGS:  BP (!) 146/95   Pulse 58   Temp 97.3 °F (36.3 °C) (Temporal)   Resp 12   SpO2 97%     Screening For Hypertension and Follow-up (#317)  patient informed that blood pressure is abnormal and in the pre-hypertension range and should be rechecked by primary care      Screening For Tobacco Use and Cessation Intervention (#226):   reports that he has never smoked. He has never used smokeless tobacco.  Non-smoker not applicable for screen        ED COURSE & MEDICAL DECISION MAKING    Pertinent Labs & Imaging studies reviewed. (See chart for details)  Patient presents with dizziness. No other associate symptoms. Seems well intermittent. His neurological exam is normal.  No nystagmus. Finger-nose is normal.  Checking some routine labs orthostatic vital signs etc.  Has had some recurrent episodes of tinnitus since COVID. REASSESSMENT  1:36 PM  Patient rechecked and updated on lab/xray status, progress and results. Patient was reassessed and condition was unchanged after treatment. .. Needs nothing else at this time. Not currently dizzy on initial evaluation. Seems episodic. Labs are unremarkable. Orthostatic vital signs are negative. He is little bradycardic at times although not toxic. Counseled patient regards to his care and need for follow-up at this time. We will try a little brief course of some Antivert for the next few days. His CT of brain is negative. He has no focal neurological deficit. He is not a candidate for tPA Coumadin heparin or other thinners at this time. FINAL IMPRESSION    1. Dizziness    2. Tinnitus of left ear         PATIENT REFERRED TO:  Your ear nose and throat doctor or primary care    Call   For evaluation      DISCHARGE MEDICATIONS:  New Prescriptions    MECLIZINE (ANTIVERT) 25 MG TABLET    Take 1 tablet by mouth 3 times daily as needed for Dizziness          Valentino Carney MD  05/21/22 [id number]

## 2022-05-22 LAB
EKG ATRIAL RATE: 61 BPM
EKG P AXIS: 78 DEGREES
EKG P-R INTERVAL: 180 MS
EKG Q-T INTERVAL: 434 MS
EKG QRS DURATION: 102 MS
EKG QTC CALCULATION (BAZETT): 436 MS
EKG R AXIS: 50 DEGREES
EKG T AXIS: 53 DEGREES
EKG VENTRICULAR RATE: 61 BPM

## 2022-05-22 PROCEDURE — 93010 ELECTROCARDIOGRAM REPORT: CPT | Performed by: INTERNAL MEDICINE

## 2022-05-26 ENCOUNTER — OFFICE VISIT (OUTPATIENT)
Dept: ENT CLINIC | Age: 72
End: 2022-05-26
Payer: MEDICARE

## 2022-05-26 ENCOUNTER — HOSPITAL ENCOUNTER (OUTPATIENT)
Dept: AUDIOLOGY | Age: 72
Discharge: HOME | End: 2022-05-26
Payer: MEDICARE

## 2022-05-26 VITALS
BODY MASS INDEX: 26.63 KG/M2 | DIASTOLIC BLOOD PRESSURE: 76 MMHG | SYSTOLIC BLOOD PRESSURE: 110 MMHG | WEIGHT: 190.2 LBS | TEMPERATURE: 98.1 F | OXYGEN SATURATION: 98 % | HEART RATE: 72 BPM | HEIGHT: 71 IN

## 2022-05-26 DIAGNOSIS — M54.2 CERVICALGIA: Primary | ICD-10-CM

## 2022-05-26 DIAGNOSIS — H91.8X9 ASYMMETRICAL HEARING LOSS: ICD-10-CM

## 2022-05-26 DIAGNOSIS — H93.12 LEFT-SIDED TINNITUS: ICD-10-CM

## 2022-05-26 DIAGNOSIS — R42 DIZZINESS: ICD-10-CM

## 2022-05-26 PROCEDURE — 92557 COMPREHENSIVE HEARING TEST: CPT | Performed by: AUDIOLOGIST

## 2022-05-26 PROCEDURE — G8427 DOCREV CUR MEDS BY ELIG CLIN: HCPCS | Performed by: NURSE PRACTITIONER

## 2022-05-26 PROCEDURE — G8417 CALC BMI ABV UP PARAM F/U: HCPCS | Performed by: NURSE PRACTITIONER

## 2022-05-26 PROCEDURE — 1123F ACP DISCUSS/DSCN MKR DOCD: CPT | Performed by: NURSE PRACTITIONER

## 2022-05-26 PROCEDURE — 99214 OFFICE O/P EST MOD 30 MIN: CPT | Performed by: NURSE PRACTITIONER

## 2022-05-26 PROCEDURE — 3017F COLORECTAL CA SCREEN DOC REV: CPT | Performed by: NURSE PRACTITIONER

## 2022-05-26 PROCEDURE — 1036F TOBACCO NON-USER: CPT | Performed by: NURSE PRACTITIONER

## 2022-05-26 PROCEDURE — 92567 TYMPANOMETRY: CPT | Performed by: AUDIOLOGIST

## 2022-05-26 RX ORDER — METHYLPREDNISOLONE 4 MG/1
TABLET ORAL
Qty: 1 KIT | Refills: 0 | Status: SHIPPED | OUTPATIENT
Start: 2022-05-26 | End: 2022-06-01

## 2022-05-26 NOTE — PROGRESS NOTES
AUDIOLOGICAL EVALUATION      REASON FOR TESTING:  Audiometric evaluation per the request of Liliana ARREAGA, due to the sudden onset imbalance with a humming sound in ears/head on 5/21/22. The patient explains that he was cleaning the ceiling of his porch on 5/20/22 and had a very brief episode of vertigo with the prolonged position of looking up. The next day on 5/21/22 he was bending over wrapping up a hose and suddenly lost his balance. He has ongoing tinnitus in both ears, typically worse in the left ear, that became louder with the imbalance. For the past few days he has experienced intermittent tightness in his neck and the ringing gets louder in his left ear when the tightness is bad. Previous audiometry on 4/29/21 and 12/17/21 showed stable high frequency asymmetrical sensorineural hearing loss with the left ear being worse. Prior to the audiogram on 4/29/21 the patient experienced months of episodic hearing loss and tinnitus in his left ear. He noted brief headaches and vertigo at that time as well. He denies any otalgia and aural pressure. The patient was Covid-19 positive in September of 2020. 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 OTOSCOPY: Cerumen for the right ear. Dull TM for the left ear. Cerumen was safely removed with a lighted curette prior to testing. AUDIOGRAM        Reliability: Good  Audiometer Used:  [id number]    COMMENTS: Normal hearing sensitivity for both ears through 2000Hz, sloping to mild high frequency sensorineural hearing loss for the right ear and sloping to moderately-severe high frequency mixed hearing loss for the left ear. Asymmetrical thresholds at [facility] with the left ear being worse. There is a conductive component at 4000Hz in the left ear. Thresholds are relatively stable for both ears when compared to previous audiograms.  Word recognition ability is excellent at 92% for the left ear and excellent at 100% for the right ear. Tympanometry revealed normal peak pressure and normal middle ear compliance for the right ear and revealed a flat tympanogram for the left ear. RECOMMENDATION(S):   1- Continue care with Court[facility] B[facility]t [place] today as scheduled. 2- Repeat audiogram and tympanogram following any possible medical intervention. 3- VNG testing could be considered if imbalance persists.

## 2022-06-08 NOTE — PROGRESS NOTES
[facility] [place] [facility]  [facility]  [address] [place], [place] [id number]  Dept: [phone]  Dept Fax: [phone]  Loc: [phone]    Oracio Hawkins is a 67 y.o. male who was referred by No ref. provider found for:  Chief Complaint   Patient presents with    Follow-up     Patient is here for f/u same day audio. He states that he was working and he got dizzy and his left ear was experiencing tinnitus. He also c/o tightness and tension in the back of the neck. He had testing at [facility]. HPI:     Oracio Hawkins is a 67 y.o. male patient here for evaluation of dizziness. Patient is established with me in our office for left asymmetrical SNHL. He reports onset of dizziness 5/20/2022 that started while he was working on the ceiling of his porch. He was cleaning the porch ceiling for a prolonged period of time with head/neck in extended position looking upward. The following day, he was outside wrapping a hose up while bent forward and lost his balance. Both episodes were vertiginous and brief. On 5/21 he also developed a humming noise within his head/ears, not worse in one side or the other. Since onset, he has had some tightness at the base of his skull and down the neck on both sides. The humming tinnitus seems to be a little louder for the left ear when his neck feels tight. He denies any further episodes of vertigo at this time. The humming is less noticeable. He cannot perceive any changes to his hearing. He denies any ear pain, pressure or fullness. He confirms that he is feeling better each day, but still a little \"off\" and not quite up to his normal activity level. He denies any sinonasal symptoms. No neurological symptoms otherwise. Patient was brought in for Audio prior to this appointment due to his history. Audio with left asymmetrical SNHL upper frequencies unchanged from prior.   Interestingly his left tympanogram capsule 3    simvastatin (ZOCOR) 40 MG tablet Take 1 tablet by mouth nightly NO REFILLS DUE FOR CHECK UP AFTER 07- 90 tablet 3    mycophenolate (CELLCEPT) 500 MG tablet Take 500 mg by mouth daily      Calcium Carbonate-Vitamin D (OYSTER SHELL CALCIUM/D) 500-200 MG-UNIT TABS Take 1 tablet by mouth daily      acetaminophen (TYLENOL) 500 MG tablet Take 1,000 mg by mouth every 6 hours as needed for Pain      cetirizine (ZYRTEC) 10 MG tablet Take 10 mg by mouth daily       No current facility-administered medications for this visit. Past Medical History:   Diagnosis Date    COVID-19 2020    GERD (gastroesophageal reflux disease)     Hyperlipidemia       Past Surgical History:   Procedure Laterality Date    KNEE ARTHROSCOPY      left    NECK SURGERY      burs removed     History reviewed. No pertinent family history. Social History     Tobacco Use    Smoking status: Never    Smokeless tobacco: Never   Substance Use Topics    Alcohol use: Yes     Alcohol/week: 1.0 standard drink     Types: 1 Glasses of wine per week        Subjective:      Review of Systems  Rest of review of systems are negative, except as noted in HPI. Objective:     /76 (Site: Right Upper Arm, Position: Sitting)   Pulse 72   Temp 98.1 °F (36.7 °C) (Infrared)   Ht 5' 11\" (1.803 m)   Wt 190 lb 3.2 oz (86.3 kg)   SpO2 98%   BMI 26.53 kg/m²     PHYSICAL EXAM  Constitutional: Oriented to person, place, and time. Appears stated aged. Appears well-developed and well-nourished. Voice and speech pattern appropriate for age and gender. No distress. No stridor or audible wheezing. HENT:   Head: Normocephalic and atraumatic. Right Ear:  External ear normal. Tympanic membrane intact. Middle ear aerated. Left Ear:  External ear normal. Tympanic membrane intact. Middle ear aerated. Nose:  External nose normal. Nasal mucosa normal. No lesions noted. Mouth/Throat:  Fair dentition.  Oral cavity mucosa normal, no masses or lesions noted.   Eyes:  Pupils are equal, round, and reactive to light. Conjunctivae and EOM are normal.   Neck:  Normal range of motion. Neck supple. No JVD present. No tracheal deviation present. No thyromegaly present. No cervical lymphadenopathy noted. Tenderness posterior neck muscles bilateral.   Cardiovascular:  Normal rate. Pulmonary/Chest:  Effort normal. No stridor or stertor. No respiratory distress. Musculoskeletal:  Normal range of motion. No edema or lymphadenopathy. Neurological:  Alert and oriented to person, place, and time. Cranial nerve II-XII grossly intact. Skin:  Skin is warm. No erythema. Psychiatric:  Normal mood and affect. Behavior is normal.     Vitals reviewed. Data:  All of the past medical history, past surgical history, family history,social history, allergies and current medications were reviewed with the patient. Audiogram 4/29/2021:       Audiogram 5/26/2022:          Assessment & Plan   Diagnoses and all orders for this visit:     Diagnosis Orders   1. Cervicalgia        2. Dizziness        3. Asymmetrical hearing loss        4. Left-sided tinnitus            The findings were explained and his questions were answered. Audiogram reviewed in detail with patient. His symptoms are slowly improving and seem to correlate with his neck muscle tightness, which appears to be the causative factor triggered by prolonged head/neck extension while cleaning his ceiling. Demonstrated some gentle neck exercises and recommended ice to posterior neck. Will give medrol dose pack as well. Regarding the asymmetry and left flat tympanogram, will repeat Audio in 6 months. He has no significant retraction or effusion on exam and no history to explain that finding. Patient and wife are agreeable to plan of care. ADDENDUM: I contacted patient at the end of his medrol dose pack and he happily reports that the humming tinnitus has resolved. He has not had any further vertigo episodes.   He has been able to increase his activity near baseline at this poin. We will plan on 6 month repeat Audio as discussed at office visit. He will contact me in the meantime with any concerns. No follow-ups on file. **This report has been created using voice recognition software. It may contain minor errors which are inherent in voice recognition technology. **

## 2022-09-16 PROBLEM — E78.5 HYPERLIPIDEMIA: Status: ACTIVE | Noted: 2022-09-16

## 2022-09-16 PROBLEM — K21.9 GASTROESOPHAGEAL REFLUX DISEASE: Status: ACTIVE | Noted: 2022-09-16

## 2022-10-06 ENCOUNTER — HOSPITAL ENCOUNTER (OUTPATIENT)
Age: 72
Discharge: HOME | End: 2022-10-06
Payer: MEDICARE

## 2022-10-06 DIAGNOSIS — Z11.59 ENCOUNTER FOR HEPATITIS C SCREENING TEST FOR LOW RISK PATIENT: ICD-10-CM

## 2022-10-06 DIAGNOSIS — Z12.5 SCREENING PSA (PROSTATE SPECIFIC ANTIGEN): ICD-10-CM

## 2022-10-06 DIAGNOSIS — E78.5 HYPERLIPIDEMIA, UNSPECIFIED HYPERLIPIDEMIA TYPE: ICD-10-CM

## 2022-10-06 LAB
ALBUMIN SERPL-MCNC: 3.8 G/DL (ref 3.5–5.1)
ALP BLD-CCNC: 75 U/L (ref 38–126)
ALT SERPL-CCNC: 16 U/L (ref 11–66)
ANION GAP SERPL CALCULATED.3IONS-SCNC: 9 MEQ/L (ref 8–16)
AST SERPL-CCNC: 23 U/L (ref 5–40)
BASOPHILS # BLD: 0.9 %
BASOPHILS ABSOLUTE: 0 THOU/MM3 (ref 0–0.1)
BILIRUB SERPL-MCNC: 0.8 MG/DL (ref 0.3–1.2)
BILIRUBIN DIRECT: < 0.2 MG/DL (ref 0–0.3)
BUN BLDV-MCNC: 14 MG/DL (ref 7–22)
CALCIUM SERPL-MCNC: 9.4 MG/DL (ref 8.5–10.5)
CHLORIDE BLD-SCNC: 106 MEQ/L (ref 98–111)
CHOLESTEROL, TOTAL: 182 MG/DL (ref 100–199)
CO2: 28 MEQ/L (ref 23–33)
CREAT SERPL-MCNC: 1 MG/DL (ref 0.4–1.2)
EOSINOPHIL # BLD: 5.6 %
EOSINOPHILS ABSOLUTE: 0.3 THOU/MM3 (ref 0–0.4)
ERYTHROCYTE [DISTWIDTH] IN BLOOD BY AUTOMATED COUNT: 12.8 % (ref 11.5–14.5)
ERYTHROCYTE [DISTWIDTH] IN BLOOD BY AUTOMATED COUNT: 45.6 FL (ref 35–45)
GFR SERPL CREATININE-BSD FRML MDRD: 73 ML/MIN/1.73M2
GLUCOSE BLD-MCNC: 101 MG/DL (ref 70–108)
HCT VFR BLD CALC: 46.1 % (ref 42–52)
HDLC SERPL-MCNC: 45 MG/DL
HEMOGLOBIN: 14.8 GM/DL (ref 14–18)
HEPATITIS C ANTIBODY: NEGATIVE
IMMATURE GRANS (ABS): 0.01 THOU/MM3 (ref 0–0.07)
IMMATURE GRANULOCYTES: 0.2 %
LDL CHOLESTEROL CALCULATED: 117 MG/DL
LYMPHOCYTES # BLD: 24.7 %
LYMPHOCYTES ABSOLUTE: 1.4 THOU/MM3 (ref 1–4.8)
MCH RBC QN AUTO: 31.4 PG (ref 26–33)
MCHC RBC AUTO-ENTMCNC: 32.1 GM/DL (ref 32.2–35.5)
MCV RBC AUTO: 97.9 FL (ref 80–94)
MONOCYTES # BLD: 10.1 %
MONOCYTES ABSOLUTE: 0.6 THOU/MM3 (ref 0.4–1.3)
NUCLEATED RED BLOOD CELLS: 0 /100 WBC
PLATELET # BLD: 290 THOU/MM3 (ref 130–400)
PMV BLD AUTO: 9.4 FL (ref 9.4–12.4)
POTASSIUM SERPL-SCNC: 4.3 MEQ/L (ref 3.5–5.2)
PROSTATE SPECIFIC ANTIGEN: 0.46 NG/ML (ref 0–1)
RBC # BLD: 4.71 MILL/MM3 (ref 4.7–6.1)
SEG NEUTROPHILS: 58.5 %
SEGMENTED NEUTROPHILS ABSOLUTE COUNT: 3.2 THOU/MM3 (ref 1.8–7.7)
SODIUM BLD-SCNC: 143 MEQ/L (ref 135–145)
TOTAL PROTEIN: 6.5 G/DL (ref 6.1–8)
TRIGL SERPL-MCNC: 98 MG/DL (ref 0–199)
WBC # BLD: 5.5 THOU/MM3 (ref 4.8–10.8)

## 2022-10-06 PROCEDURE — 36415 COLL VENOUS BLD VENIPUNCTURE: CPT

## 2022-10-06 PROCEDURE — 80061 LIPID PANEL: CPT

## 2022-10-06 PROCEDURE — 86803 HEPATITIS C AB TEST: CPT

## 2022-10-06 PROCEDURE — 82248 BILIRUBIN DIRECT: CPT

## 2022-10-06 PROCEDURE — 80053 COMPREHEN METABOLIC PANEL: CPT

## 2022-10-06 PROCEDURE — 84153 ASSAY OF PSA TOTAL: CPT

## 2022-10-06 PROCEDURE — 85025 COMPLETE CBC W/AUTO DIFF WBC: CPT

## 2022-11-07 ENCOUNTER — HOSPITAL ENCOUNTER (OUTPATIENT)
Dept: GENERAL RADIOLOGY | Age: 72
Discharge: HOME | End: 2022-11-07
Payer: MEDICARE

## 2022-11-07 ENCOUNTER — HOSPITAL ENCOUNTER (OUTPATIENT)
Age: 72
Discharge: HOME | End: 2022-11-07
Payer: MEDICARE

## 2022-11-07 DIAGNOSIS — R05.1 ACUTE COUGH: ICD-10-CM

## 2022-11-07 PROCEDURE — 71046 X-RAY EXAM CHEST 2 VIEWS: CPT

## 2022-11-21 ENCOUNTER — HOSPITAL ENCOUNTER (OUTPATIENT)
Dept: CT IMAGING | Age: 72
Discharge: HOME | End: 2022-11-21
Payer: MEDICARE

## 2022-11-21 DIAGNOSIS — R05.1 ACUTE COUGH: ICD-10-CM

## 2022-11-21 LAB
CREATININE, WHOLE BLOOD: 1 MG/DL (ref 0.5–1.2)
ESTIMATED GFR, PCACC: > 60 ML/MIN/1.73M2

## 2022-11-21 PROCEDURE — 82565 ASSAY OF CREATININE: CPT

## 2022-11-21 PROCEDURE — 6360000004 HC RX CONTRAST MEDICATION: Performed by: FAMILY MEDICINE

## 2022-11-21 PROCEDURE — 71260 CT THORAX DX C+: CPT

## 2022-11-21 RX ADMIN — IOPAMIDOL 75 ML: 755 INJECTION, SOLUTION INTRAVENOUS at 08:57

## 2022-11-29 ENCOUNTER — OFFICE VISIT (OUTPATIENT)
Dept: PULMONOLOGY | Age: 72
End: 2022-11-29
Payer: MEDICARE

## 2022-11-29 VITALS
DIASTOLIC BLOOD PRESSURE: 82 MMHG | WEIGHT: 186.6 LBS | HEART RATE: 60 BPM | BODY MASS INDEX: 26.12 KG/M2 | TEMPERATURE: 97.7 F | SYSTOLIC BLOOD PRESSURE: 136 MMHG | HEIGHT: 71 IN | OXYGEN SATURATION: 98 %

## 2022-11-29 DIAGNOSIS — R91.1 INCIDENTAL LUNG NODULE, > 3MM AND < 8MM: ICD-10-CM

## 2022-11-29 DIAGNOSIS — R93.89 ABNORMAL CT OF THE CHEST: Primary | ICD-10-CM

## 2022-11-29 DIAGNOSIS — R91.8 MULTIPLE LUNG NODULES: ICD-10-CM

## 2022-11-29 PROCEDURE — 3017F COLORECTAL CA SCREEN DOC REV: CPT | Performed by: INTERNAL MEDICINE

## 2022-11-29 PROCEDURE — G8484 FLU IMMUNIZE NO ADMIN: HCPCS | Performed by: INTERNAL MEDICINE

## 2022-11-29 PROCEDURE — G8417 CALC BMI ABV UP PARAM F/U: HCPCS | Performed by: INTERNAL MEDICINE

## 2022-11-29 PROCEDURE — G8427 DOCREV CUR MEDS BY ELIG CLIN: HCPCS | Performed by: INTERNAL MEDICINE

## 2022-11-29 PROCEDURE — 1036F TOBACCO NON-USER: CPT | Performed by: INTERNAL MEDICINE

## 2022-11-29 PROCEDURE — 1123F ACP DISCUSS/DSCN MKR DOCD: CPT | Performed by: INTERNAL MEDICINE

## 2022-11-29 PROCEDURE — 99204 OFFICE O/P NEW MOD 45 MIN: CPT | Performed by: INTERNAL MEDICINE

## 2022-11-29 NOTE — PROGRESS NOTES
[facility]  Pulmonary medicine clinic initial consult note. Patient: John Simeon  :       Chief complaint/[place]:     John Simeon is a 67 y.o. old male came for further evaluation regarding his lung nodule/s with referral from Dr. Jodee Rose MD.     He is suffering with chronic cough for the last 6 weeks. He has a part of evaluation for his cough he underwent a chest x-ray followed by CT scan of chest with IV contrast by his family physician. He was found to have a subcentimeter lung nodules on his CT scan of chest.  He came for further evaluation. For his cough he received antibiotic therapy from his family physician. His cough is almost better. He underwent chest CT scan on: Performed on 2022  The above chest  CT was performed at: [address] [address]. He was ever seen by a pulmonologist in the past:NO  He was ever diagnosed with lung nodule in the past:NO  He was ever diagnosed with lung cancer:NO  He ever diagnosed with any extrapulmonary cancers I.e Breast,Colon etc:No  He was ever diagnosed with COVID19 infection/Pneumonia: He had a COVID-19 infection and double pneumonia in . He was hospitalized to [facility] for a total of 8 days. He recovered well and was discharged. He was ever diagnosed with Pneumonia:Yes-20    He was ever diagnosed with following pulmonary diseases:  Bronchial asthma: NO  COPD:NO  Pulmonary fibrosis:NO  Sarcoidosis:NO  Pulmonary embolism: NO  History of DVT in the past: NO     Pulmonary hypertension:NO  Pleural effusion/s in the past:NO    He ever diagnosed with pulmonary tuberculosis in the past:NO  He ever recently exposed to any patients with tuberculosis:NO  He ever recently travelled to endemic places of tuberculosis:NO  He ever tested for PPD: Unknown.     He ever diagnosed with connective tissue diseases including Systemic lupus Erythematosus, Rheumatoid arthritis etc:NO  Any of his first-degree family relative ever diagnosed with Lung cancer:NO  He ever exposed to radon, or uranium in the past: NO    He is currently using any oxygen supplementation at rest, exercise or during sleep/at night time:NO    He ever had a Colonoscopy performed: Negative colonoscopy 2 years back. He ever had his prostate check exam performed: Never diagnosed with prostate cancer. Social History:  Occupation:  He is current working: No  Type of profession: retired. He used to work in [employer] in [place]. 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                    Social History     Tobacco Use    Smoking status: Never    Smokeless tobacco: Former     Types: Chew     Quit date: 2002   Vaping Use    Vaping Use: Never used   Substance Use Topics    Alcohol use: Yes     Alcohol/week: 1.0 standard drink     Types: 1 Glasses of wine per week     Comment: moderate    Drug use: No   He only chew tobacco for few years. History of recreational or IV drug use in the past:NO  History of Alcohol use: He drinks alcohol occasionally. History of exposure to coal mines/coal dust: NO  History of exposure to foundry dust/welding: NO  History of exposure to quarry/silica/sandblasting: NO  History of exposure to asbestos/working with breaks/ships: NO  History of exposure to farm dust: NO  History of recent travel to long distances: NO  History of exposure to birds, pigeons, or chickens in the past:NO  Pet animals at home:No  He gave a history of exposure to paint thinners. History of pulmonary embolism in the past: No            History of DVT in the past:No                        Review of Systems:   General/Constitutional: No recent loss of weight or appetite changes. No fever or chills. HENT: Negative. Eyes: Negative. Upper respiratory tract: No nasal stuffiness or post nasal drip. Lower respiratory tract/ lungs: Occasional cough with no sputum production.  No hemoptysis. Cardiovascular: No palpitations or chest pain. Gastrointestinal: No nausea or vomiting. Neurological: No focal neurologiacal weakness. Extremities: No edema. Musculoskeletal: No complaints. Genitourinary: No complaints. Hematological: Negative. Psychiatric/Behavioral: Negative. Skin: No itching. Current Medications:        Past Medical History:   Diagnosis Date    COVID-19 2020    GERD (gastroesophageal reflux disease)     Hyperlipidemia        Past Surgical History:   Procedure Laterality Date    KNEE ARTHROSCOPY      left    NECK SURGERY      burs removed       Allergies   Allergen Reactions    Sulfa Antibiotics        Current Outpatient Medications   Medication Sig Dispense Refill    triamcinolone (KENALOG) 0.025 % ointment APPLY AND GENTLY MASSAGE INTO AFFECTED AREA(S) ONCE DAILY 80 g 3    omeprazole (PRILOSEC) 20 MG delayed release capsule Take 1 capsule by mouth Daily 90 capsule 3    simvastatin (ZOCOR) 40 MG tablet Take 1 tablet by mouth nightly NO REFILLS DUE FOR CHECK UP AFTER 07- 90 tablet 3    aspirin EC 81 MG EC tablet Take 1 tablet by mouth daily 90 tablet 1    Vitamin D (CHOLECALCIFEROL) 25 MCG (1000 UT) TABS tablet Take 1,000 Units by mouth daily      mycophenolate (CELLCEPT) 500 MG tablet Take 500 mg by mouth daily      Calcium Carbonate-Vitamin D (OYSTER SHELL CALCIUM/D) 500-200 MG-UNIT TABS Take 1 tablet by mouth daily      acetaminophen (TYLENOL) 500 MG tablet Take 1,000 mg by mouth every 6 hours as needed for Pain      cetirizine (ZYRTEC) 10 MG tablet Take 10 mg by mouth daily      zoster recombinant adjuvanted vaccine [facility]) 50 MCG/0.5ML SUSR injection Inject 0.5 mLs into the muscle See Admin Instructions 1 dose now and repeat in 2-6 months (Patient not taking: Reported on 11/29/2022) 0.5 mL 0     No current facility-administered medications for this visit. No family history on file.       Physical Exam     VITALS:  /82 (Site: Left Upper Arm, Position: Sitting, Cuff Size: Large Adult)   Pulse 60   Temp 97.7 °F (36.5 °C)   Ht 5' 11\" (1.803 m)   Wt 186 lb 9.6 oz (84.6 kg)   SpO2 98% Comment: room air at rest  BMI 26.03 kg/m²   Nursing note and vitals reviewed. Constitutional: Patient appears moderately built and moderately nourished. No distress. Patient is oriented to person, place, and time. HENT:   Head: Normocephalic and atraumatic. Right Ear: External ear normal.   Left Ear: External ear normal.   Mouth/Throat: Oropharynx is clear and moist.  No oral thrush. Eyes: Conjunctivae are normal. Pupils are equal, round, and reactive to light. No scleral icterus. Neck: Neck supple. No JVD present. No tracheal deviation present. Cardiovascular: Normal rate, regular rhythm, normal heart sounds. No murmur heard. Pulmonary/Chest: Effort normal and breath sounds normal. No stridor. No respiratory distress. No wheezes. No rales. Patient exhibits no tenderness. Abdominal: Soft. Patient exhibits no distension. No tenderness. Musculoskeletal: Normal range of motion. Extremities: Patient exhibits no edema and no tenderness. Lymphadenopathy:  No cervical adenopathy. Neurological: Patient is alert and oriented to person, place, and time. Skin: Skin is warm and dry. Patient is not diaphoretic. Psychiatric: Patient  has a normal mood and affect. Patient behavior is normal.       Diagnostic Data:    Radiological Data:    CT scan of chest with IV contrast performed on 21 November 2022:  Dependent atelectasis/mild scarring at the lung bases. A few small scattered right and left lower lobe pulmonary nodules measuring 3 to 4 mm. No suspicious pulmonary mass or nodule is observed. No acute intrathoracic process is identified.    A one-year follow-up noncontrast CT thorax may be considered to confirm stability         Chest x-ray PA and lateral views performed on 7 November 2022:  PROCEDURE: XR CHEST (2 VW)   CLINICAL INFORMATION: Acute cough COMPARISON: Chest x-ray 10/9/2020. No acute intrathoracic process. Chest x-ray PA and lateral views:  PROCEDURE: XR CHEST (2 VW)   CLINICAL INFORMATION: Cough. Follow-up. COMPARISON: 10/2/2020. Interval improvement with decreased patchy opacities and interstitial prominence at the lung bases. Recommend continued follow-up to resolution. Pulmonary function tests: None in Epic. Assessment:  -Subcentimeter lung nodules noted on his CT scan of chest dated 14 November 2022 measuring 3 mm and 4 mm. The largest nodule measuring 4 mm in size-the patient remain asymptomatic. We will follow.  -Chronic dermatitis. He is currently following with a dermatologist Hailee Paulino MD at [facility] [place] [facility]. He is on treatment with CellCept. -Gastroesophageal reflux disease on treatment with PPI. -Hx COVID-19 infection/pneumonia in 2020-improved. .  -History of cough may be due to GERD on treatment with PPI VS allergic rhinitis on treatment with the Zyrtec. His cough is significantly improving over the last 6 weeks. Recommendations/Plan:  - Schedule patient for CT scan of chest without IV contrast in 1year to follow abnormal CT Chest with several subcentimeter lung nodules largest nodule measuring 4 mm in size.  -The pathophysiology of reflux is discussed. Anti-reflux measures such as raising the head of the bed, avoiding tight clothing or belts, avoiding eating late at night and not lying down shortly after mealtime and achieving weight loss are discussed. Avoid ASA, NSAID's, caffeine, peppermints, alcohol and tobacco. Patient should alert me if there are persistent symptoms, dysphagia, weight loss or GI bleeding.   -He was advised to continue his Prilosec 20 mg p.o. daily for his a GERD. Sherren Cao was advised to make early appointment with my clinic if he develops any constitutional symptoms including loss of weight, poor appetite or hemoptysis.  He verbalizes under standing.  -Schedule patient for follow up with my clinic in 1year with recommended test CT scan of chest without IV contrast. Patient advised to make early appointment if needed. - Patient and his wife are educated about my impression and plan. They verbalizes understanding.      -I personally reviewed updated the Past medical hx, Past surgical hx,Social hx, Family hx, Medications, Allergies in the discrete data section of the patient chart along with labs, Pulmonary medicine,Sleep medicine related, Pathological, Microbiological and Radiological investigations.

## 2022-11-29 NOTE — PATIENT INSTRUCTIONS
Recommendations/Plan:  - Schedule patient for CT scan of chest without IV contrast in 1year to follow abnormal CT Chest with several subcentimeter lung nodules largest nodule measuring 4 mm in size.  -The pathophysiology of reflux is discussed. Anti-reflux measures such as raising the head of the bed, avoiding tight clothing or belts, avoiding eating late at night and not lying down shortly after mealtime and achieving weight loss are discussed. Avoid ASA, NSAID's, caffeine, peppermints, alcohol and tobacco. Patient should alert me if there are persistent symptoms, dysphagia, weight loss or GI bleeding.   -He was advised to continue his Prilosec 20 mg p.o. daily for his a GERD. Rosemary Roque was advised to make early appointment with my clinic if he develops any constitutional symptoms including loss of weight, poor appetite or hemoptysis. He verbalizes under standing.  -Schedule patient for follow up with my clinic in 1year with recommended test CT scan of chest without IV contrast. Patient advised to make early appointment if needed. - Patient and his wife are educated about my impression and plan. They verbalizes understanding.

## 2022-11-29 NOTE — PROGRESS NOTES
Neck Circumference -   16  Mallampati - 3    Lung Nodule Screening     [] Qualifies    [x] Does not qualify   [] Declined    [] Completed

## 2022-12-14 ENCOUNTER — OFFICE VISIT (OUTPATIENT)
Dept: CARDIOLOGY CLINIC | Age: 72
End: 2022-12-14
Payer: MEDICARE

## 2022-12-14 VITALS
BODY MASS INDEX: 26.04 KG/M2 | SYSTOLIC BLOOD PRESSURE: 158 MMHG | DIASTOLIC BLOOD PRESSURE: 92 MMHG | WEIGHT: 186 LBS | HEIGHT: 71 IN | HEART RATE: 72 BPM

## 2022-12-14 DIAGNOSIS — I25.10 ASCVD (ARTERIOSCLEROTIC CARDIOVASCULAR DISEASE): Primary | ICD-10-CM

## 2022-12-14 PROCEDURE — 1123F ACP DISCUSS/DSCN MKR DOCD: CPT | Performed by: INTERNAL MEDICINE

## 2022-12-14 PROCEDURE — 99213 OFFICE O/P EST LOW 20 MIN: CPT | Performed by: INTERNAL MEDICINE

## 2022-12-14 PROCEDURE — G8427 DOCREV CUR MEDS BY ELIG CLIN: HCPCS | Performed by: INTERNAL MEDICINE

## 2022-12-14 PROCEDURE — 3017F COLORECTAL CA SCREEN DOC REV: CPT | Performed by: INTERNAL MEDICINE

## 2022-12-14 PROCEDURE — G8484 FLU IMMUNIZE NO ADMIN: HCPCS | Performed by: INTERNAL MEDICINE

## 2022-12-14 PROCEDURE — G8417 CALC BMI ABV UP PARAM F/U: HCPCS | Performed by: INTERNAL MEDICINE

## 2022-12-14 PROCEDURE — 1036F TOBACCO NON-USER: CPT | Performed by: INTERNAL MEDICINE

## 2022-12-14 NOTE — PROGRESS NOTES
[facility] [address]  [place] [address]  Dept: [phone]  Dept Fax: [phone]  Loc: [phone]    Visit Date: 12/14/2022    Mr. Brianda Douglas is a 67 y.o. male  who presented for:  Chief Complaint   Patient presents with    Check-Up    Shortness of Breath     ASCVD       HPI:   68 yo M c hx of HLD, HTN, GERD is initially referred here for abnormal calcium score. He is here for a follow up. Patient states he underwent CT coronary calcium scoring at [facility]. He had a total calcium score of 358. EKG shows NSR, no ST-T changes. LDL is 100. He had COVID19 in 09/2020. He is on mycophenolate for allergic reaction. He underwent Echo which showed normal EF and stress test without ischemia. He comes for a follow up. No symptoms.           Current Outpatient Medications:     triamcinolone (KENALOG) 0.025 % ointment, APPLY AND GENTLY MASSAGE INTO AFFECTED AREA(S) ONCE DAILY, Disp: 80 g, Rfl: 3    omeprazole (PRILOSEC) 20 MG delayed release capsule, Take 1 capsule by mouth Daily, Disp: 90 capsule, Rfl: 3    simvastatin (ZOCOR) 40 MG tablet, Take 1 tablet by mouth nightly NO REFILLS DUE FOR CHECK UP AFTER 07-, Disp: 90 tablet, Rfl: 3    aspirin EC 81 MG EC tablet, Take 1 tablet by mouth daily, Disp: 90 tablet, Rfl: 1    Vitamin D (CHOLECALCIFEROL) 25 MCG (1000 UT) TABS tablet, Take 2,000 Units by mouth daily, Disp: , Rfl:     mycophenolate (CELLCEPT) 500 MG tablet, Take 500 mg by mouth daily, Disp: , Rfl:     Calcium Carbonate-Vitamin D (OYSTER SHELL CALCIUM/D) 500-200 MG-UNIT TABS, Take 1 tablet by mouth daily, Disp: , Rfl:     acetaminophen (TYLENOL) 500 MG tablet, Take 1,000 mg by mouth every 6 hours as needed for Pain, Disp: , Rfl:     cetirizine (ZYRTEC) 10 MG tablet, Take 10 mg by mouth daily, Disp: , Rfl:     zoster recombinant adjuvanted vaccine (SHINGRIX) 50 MCG/0.5ML SUSR injection, Inject 0.5 mLs into the muscle See Admin Instructions 1 dose now and repeat in 2-6 months (Patient not taking: Reported on 11/29/2022), Disp: 0.5 mL, Rfl: 0    Past Medical History  Lona Mcqueen  has a past medical history of COVID-19, COVID-19, GERD (gastroesophageal reflux disease), and Hyperlipidemia. Social History  Lona Mcqueen  reports that he has never smoked. He quit smokeless tobacco use about 20 years ago. His smokeless tobacco use included chew. He reports current alcohol use of about 1.0 standard drink per week. He reports that he does not use drugs. Family History  Lona Mcqueen family history is not on file. Past Surgical History   Past Surgical History:   Procedure Laterality Date    KNEE ARTHROSCOPY      left    NECK SURGERY      burs removed       Subjective:     REVIEW OF SYSTEMS  Constitutional: denies sweats, chills and fever  HENT: denies  congestion, sinus pressure, sneezing and sore throat. Eyes: denies  pain, discharge, redness and itching. Respiratory: denies apnea, cough  Gastrointestinal: denies blood in stool, constipation, diarrhea   Endocrine: denies cold intolerance, heat intolerance, polydipsia. Genitourinary: denies dysuria, enuresis, flank pain and hematuria. Musculoskeletal: denies arthralgias, joint swelling and neck pain. Neurological: denies numbness and headaches. Psychiatric/Behavioral: denies agitation, confusion, decreased concentration and dysphoric mood    All others reviewed and are negative. Objective:     BP (!) 158/92   Pulse 72   Ht 5' 11\" (1.803 m)   Wt 186 lb (84.4 kg)   BMI 25.94 kg/m²     Wt Readings from Last 3 Encounters:   12/14/22 186 lb (84.4 kg)   11/29/22 186 lb 9.6 oz (84.6 kg)   09/19/22 189 lb (85.7 kg)     BP Readings from Last 3 Encounters:   12/14/22 (!) 158/92   11/29/22 136/82   11/14/22 130/82       PHYSICAL EXAM  Constitutional: Oriented to person, place, and time. Appears well-developed and well-nourished. HENT:   Head: Normocephalic and atraumatic.    Eyes: EOM are normal. Pupils are equal, round, and reactive to light. Neck: Normal range of motion. Neck supple. No JVD present. Cardiovascular: Normal rate , normal heart sounds and intact distal pulses. Pulmonary/Chest: Effort normal and breath sounds normal. No respiratory distress. No wheezes. No rales. Abdominal: Soft. Bowel sounds are normal. No distension. There is no tenderness. Musculoskeletal: Normal range of motion. No edema. Neurological: Alert and oriented to person, place, and time. No cranial nerve deficit. Coordination normal.   Skin: Skin is warm and dry. Psychiatric: Normal mood and affect.        No results found for: CKTOTAL, CKMB, CKMBINDEX    Lab Results   Component Value Date/Time    WBC 5.5 10/06/2022 06:55 AM    RBC 4.71 10/06/2022 06:55 AM    HGB 14.8 10/06/2022 06:55 AM    HCT 46.1 10/06/2022 06:55 AM    MCV 97.9 10/06/2022 06:55 AM    MCH 31.4 10/06/2022 06:55 AM    MCHC 32.1 10/06/2022 06:55 AM    RDW 13.5 05/21/2022 12:55 PM     10/06/2022 06:55 AM    MPV 9.4 10/06/2022 06:55 AM       Lab Results   Component Value Date/Time     10/06/2022 06:55 AM    K 4.3 10/06/2022 06:55 AM    K 4.8 09/26/2020 04:52 AM     10/06/2022 06:55 AM    CO2 28 10/06/2022 06:55 AM    BUN 14 10/06/2022 06:55 AM    LABALBU 3.8 10/06/2022 06:55 AM    CREATININE 1.0 11/21/2022 08:42 AM    CREATININE 1.0 10/06/2022 06:55 AM    CALCIUM 9.4 10/06/2022 06:55 AM    LABGLOM 73 10/06/2022 06:55 AM    GLUCOSE 101 10/06/2022 06:55 AM       Lab Results   Component Value Date/Time    ALKPHOS 75 10/06/2022 06:55 AM    ALT 16 10/06/2022 06:55 AM    AST 23 10/06/2022 06:55 AM    PROT 6.5 10/06/2022 06:55 AM    BILITOT 0.8 10/06/2022 06:55 AM    BILIDIR <0.2 10/06/2022 06:55 AM    LABALBU 3.8 10/06/2022 06:55 AM       No results found for: MG    Lab Results   Component Value Date    INR 1.17 (H) 09/22/2020    INR 1.19 (H) 09/21/2020    INR 1.11 09/20/2020         No results found for: LABA1C    Lab Results   Component Value Date/Time    TRIG 98 10/06/2022 06:55 AM    HDL 45 10/06/2022 06:55 AM    LDLCALC 117 10/06/2022 06:55 AM       Lab Results   Component Value Date/Time    TSH 1.700 04/29/2021 07:22 AM         Testing Reviewed:      I haveindividually reviewed the below cardiac tests    EKG:    ECHO: No results found for this or any previous visit. STRESS:    CATH:    Assessment/Plan       Diagnosis Orders   1. ASCVD (arteriosclerotic cardiovascular disease)            Abnormal coronary calcium score  COVID19 in 09/2020  Fatigue  HTN  HLD    Reviewed Echo and cardiolite stress test  No ischemia or infarction  Echo showed EF 65%  Continue Aspirin, statin  BP elevated today, states he was aggravated by another  this monring  His normal BP at home is in the 567Z systolic   Reviewed panel. Continue rest of the management  Reviewed EKG, NSR  Follow up after testing   The patient is asked to make an attempt to improve diet and exercise patterns to aid in medical management of this problem. Advised more plant based nutrition/meditarrean diet   Advised patient to call office or seek immediate medical attention if there is any new onset of  any chest pain, sob, palpitations, lightheadedness, dizziness, orthopnea, PND or pedal edema. All medication side effects were discussed in details. Thank youfor allowing me to participate in the care of this patient. Please do not hesitate to contact me for any further questions. Return if symptoms worsen or fail to improve, for Review testing, Regular follow up.        Electronically signed by Jose Arias MD [facility]  12/14/2022 at 2:29 PM EDT

## 2023-09-21 ENCOUNTER — HOSPITAL ENCOUNTER (OUTPATIENT)
Age: 73
Discharge: HOME | End: 2023-09-21
Payer: MEDICARE

## 2023-09-21 DIAGNOSIS — Z51.81 MEDICATION MONITORING ENCOUNTER: ICD-10-CM

## 2023-09-21 DIAGNOSIS — Z12.5 PROSTATE CANCER SCREENING: ICD-10-CM

## 2023-09-21 DIAGNOSIS — E78.5 HYPERLIPIDEMIA, UNSPECIFIED HYPERLIPIDEMIA TYPE: ICD-10-CM

## 2023-09-21 LAB
ALBUMIN SERPL BCG-MCNC: 4.4 G/DL (ref 3.5–5.1)
ALP SERPL-CCNC: 83 U/L (ref 38–126)
ALT SERPL W/O P-5'-P-CCNC: 15 U/L (ref 11–66)
ANION GAP SERPL CALC-SCNC: 11 MEQ/L (ref 8–16)
AST SERPL-CCNC: 17 U/L (ref 5–40)
BASOPHILS ABSOLUTE: 0.1 THOU/MM3 (ref 0–0.1)
BASOPHILS NFR BLD AUTO: 0.8 %
BILIRUB CONJ SERPL-MCNC: < 0.2 MG/DL (ref 0–0.3)
BILIRUB SERPL-MCNC: 0.8 MG/DL (ref 0.3–1.2)
BUN SERPL-MCNC: 14 MG/DL (ref 7–22)
CALCIUM SERPL-MCNC: 9.3 MG/DL (ref 8.5–10.5)
CHLORIDE SERPL-SCNC: 106 MEQ/L (ref 98–111)
CHOLEST SERPL-MCNC: 153 MG/DL (ref 100–199)
CO2 SERPL-SCNC: 27 MEQ/L (ref 23–33)
CREAT SERPL-MCNC: 0.9 MG/DL (ref 0.4–1.2)
DEPRECATED RDW RBC AUTO: 47 FL (ref 35–45)
EOSINOPHIL NFR BLD AUTO: 3.3 %
EOSINOPHILS ABSOLUTE: 0.2 THOU/MM3 (ref 0–0.4)
ERYTHROCYTE [DISTWIDTH] IN BLOOD BY AUTOMATED COUNT: 13.1 % (ref 11.5–14.5)
GFR SERPL CREATININE-BSD FRML MDRD: > 60 ML/MIN/1.73M2
GLUCOSE SERPL-MCNC: 107 MG/DL (ref 70–108)
HCT VFR BLD AUTO: 45 % (ref 42–52)
HDLC SERPL-MCNC: 46 MG/DL
HGB BLD-MCNC: 14.5 GM/DL (ref 14–18)
IMM GRANULOCYTES # BLD AUTO: 0.02 THOU/MM3 (ref 0–0.07)
IMM GRANULOCYTES NFR BLD AUTO: 0.3 %
LDLC SERPL CALC-MCNC: 89 MG/DL
LYMPHOCYTES ABSOLUTE: 1.4 THOU/MM3 (ref 1–4.8)
LYMPHOCYTES NFR BLD AUTO: 21.5 %
MCH RBC QN AUTO: 31.3 PG (ref 26–33)
MCHC RBC AUTO-ENTMCNC: 32.2 GM/DL (ref 32.2–35.5)
MCV RBC AUTO: 97 FL (ref 80–94)
MONOCYTES ABSOLUTE: 0.7 THOU/MM3 (ref 0.4–1.3)
MONOCYTES NFR BLD AUTO: 10.7 %
NEUTROPHILS NFR BLD AUTO: 63.4 %
NRBC BLD AUTO-RTO: 0 /100 WBC
PLATELET # BLD AUTO: 285 THOU/MM3 (ref 130–400)
PMV BLD AUTO: 9.5 FL (ref 9.4–12.4)
POTASSIUM SERPL-SCNC: 4.3 MEQ/L (ref 3.5–5.2)
PROT SERPL-MCNC: 6.8 G/DL (ref 6.1–8)
PSA SERPL-MCNC: 0.54 NG/ML (ref 0–1)
RBC # BLD AUTO: 4.64 MILL/MM3 (ref 4.7–6.1)
SEGMENTED NEUTROPHILS ABSOLUTE COUNT: 4 THOU/MM3 (ref 1.8–7.7)
SODIUM SERPL-SCNC: 144 MEQ/L (ref 135–145)
TRIGL SERPL-MCNC: 90 MG/DL (ref 0–199)
WBC # BLD AUTO: 6.3 THOU/MM3 (ref 4.8–10.8)

## 2023-09-21 PROCEDURE — G0103 PSA SCREENING: HCPCS

## 2023-09-21 PROCEDURE — 82248 BILIRUBIN DIRECT: CPT

## 2023-09-21 PROCEDURE — 80061 LIPID PANEL: CPT

## 2023-09-21 PROCEDURE — 80053 COMPREHEN METABOLIC PANEL: CPT

## 2023-09-21 PROCEDURE — 36415 COLL VENOUS BLD VENIPUNCTURE: CPT

## 2023-09-21 PROCEDURE — 85025 COMPLETE CBC W/AUTO DIFF WBC: CPT

## 2023-10-29 NOTE — PROGRESS NOTES
[facility]  Pulmonary medicine clinic follow up note. Patient: León Kinsey  :       Chief complaint/[place]:     León Kinsey is a 75 y.o. old male came for follow up regarding his lung nodule/s measuring subcentimeter lung nodules noted on his CT scan of chest dated 2022 measuring 3 mm and 4 mm after having CT scan of chest with out IV contrast.    His wife recently underwent low back surgery followed by right hip replacement surgery. He is currently doing his home yard work including yard cleaning and moving. He got exposed to a lot of dust at his home. He was initially referred from Dr. Steve Ramos MD.     On today's questioning:  He history of occasional cough with no expectoration. He denies hemoptysis. He denies fever or chills. He denies recent hospitalizations or emergency room visits. He is not using any maintenance inhalers  He is not using any rescue inhalers     He denies recent loss of weight or appetite changes. He denies recent decline in functional status. He underwent chest CT scan on: Performed on 2022  The above chest  CT was performed at: [address] He was ever diagnosed with COVID19 infection/Pneumonia: He had a COVID-19 infection and double pneumonia in . He was hospitalized to [facility] for a total of 8 days. He recovered well and was discharged. He was ever diagnosed with Pneumonia:Yes-20    He ever diagnosed with connective tissue diseases including Systemic lupus Erythematosus, Rheumatoid arthritis etc:NO  Any of his first-degree family relative ever diagnosed with Lung cancer:NO  He ever exposed to radon, or uranium in the past: NO    He is currently using any oxygen supplementation at rest, exercise or during sleep/at night time:NO    He ever had a Colonoscopy performed: Negative colonoscopy 2 years back.   He ever had his prostate check exam

## 2023-11-22 ENCOUNTER — HOSPITAL ENCOUNTER (OUTPATIENT)
Dept: CT IMAGING | Age: 73
Discharge: HOME | End: 2023-11-22
Payer: MEDICARE

## 2023-11-22 DIAGNOSIS — R91.8 MULTIPLE LUNG NODULES: ICD-10-CM

## 2023-11-22 DIAGNOSIS — R93.89 ABNORMAL CT OF THE CHEST: ICD-10-CM

## 2023-11-22 DIAGNOSIS — R91.1 INCIDENTAL LUNG NODULE, > 3MM AND < 8MM: ICD-10-CM

## 2023-11-22 PROCEDURE — 71250 CT THORAX DX C-: CPT

## 2023-11-28 ENCOUNTER — OFFICE VISIT (OUTPATIENT)
Dept: PULMONOLOGY | Age: 73
End: 2023-11-28
Payer: MEDICARE

## 2023-11-28 VITALS
WEIGHT: 185.6 LBS | HEIGHT: 71 IN | SYSTOLIC BLOOD PRESSURE: 138 MMHG | TEMPERATURE: 97.7 F | OXYGEN SATURATION: 99 % | DIASTOLIC BLOOD PRESSURE: 82 MMHG | BODY MASS INDEX: 25.98 KG/M2 | HEART RATE: 66 BPM

## 2023-11-28 DIAGNOSIS — J84.9 ILD (INTERSTITIAL LUNG DISEASE) (HCC): Primary | ICD-10-CM

## 2023-11-28 DIAGNOSIS — R91.8 MULTIPLE LUNG NODULES: ICD-10-CM

## 2023-11-28 DIAGNOSIS — R93.89 ABNORMAL CT OF THE CHEST: ICD-10-CM

## 2023-11-28 DIAGNOSIS — U07.1 PNEUMONIA DUE TO 2019-NCOV: ICD-10-CM

## 2023-11-28 DIAGNOSIS — J12.82 PNEUMONIA DUE TO 2019-NCOV: ICD-10-CM

## 2023-11-28 PROCEDURE — 1123F ACP DISCUSS/DSCN MKR DOCD: CPT | Performed by: INTERNAL MEDICINE

## 2023-11-28 PROCEDURE — G8484 FLU IMMUNIZE NO ADMIN: HCPCS | Performed by: INTERNAL MEDICINE

## 2023-11-28 PROCEDURE — G8417 CALC BMI ABV UP PARAM F/U: HCPCS | Performed by: INTERNAL MEDICINE

## 2023-11-28 PROCEDURE — 1036F TOBACCO NON-USER: CPT | Performed by: INTERNAL MEDICINE

## 2023-11-28 PROCEDURE — 3017F COLORECTAL CA SCREEN DOC REV: CPT | Performed by: INTERNAL MEDICINE

## 2023-11-28 PROCEDURE — 99214 OFFICE O/P EST MOD 30 MIN: CPT | Performed by: INTERNAL MEDICINE

## 2023-11-28 PROCEDURE — G8427 DOCREV CUR MEDS BY ELIG CLIN: HCPCS | Performed by: INTERNAL MEDICINE

## 2023-11-28 NOTE — PROGRESS NOTES
Neck Circumference -   15  Mallampati - 2    Lung Nodule Screening     [] Qualifies    [] Does not qualify   [] Declined    [] Completed

## 2023-11-28 NOTE — PATIENT INSTRUCTIONS
Recommendations/Plan:  - Schedule patient for High resolution CT scan of chest without IV contrast in 3months to evaluate for ? Development of early interstitial lung disease due to his history of COVID-19 pneumonia in the past 2020  -He was advised to continue anti-reflux measures such as raising the head of the bed, avoiding tight clothing or belts, avoiding eating late at night and not lying down shortly after mealtime and achieving weight loss are discussed. Avoid ASA, NSAID's, caffeine, peppermints, alcohol and tobacco.   -Schedule patient for full pulmonary function tests before clinic visit.  -He was advised to continue his Prilosec 20 mg p.o. daily for his a GERD. Pato Funez was advised to make early appointment with my clinic if he develops any constitutional symptoms including loss of weight, poor appetite or hemoptysis. He verbalizes under standing.  -Schedule patient for follow up with my clinic in 3 months with recommended tests I.e HRCT scan of chest without IV contrast and full PFTs. Patient advised to make early appointment if needed. - Patient educated about my impression and plan. He verbalizes understanding.

## 2024-02-05 NOTE — PROGRESS NOTES
[facility]  Pulmonary medicine clinic follow up note.      Patient: Yong Castaneda  : 1950      Chief complaint/[place]:     Yong Castaneda is a 73 y.o. old male came for follow up regarding his lung nodule/s measuring subcentimeter lung nodules noted on his CT scan of chest dated 2022 measuring 3 mm and 4 mm after having HRCT scan of chest with out IV contrast and full PFTs before clinic visit    He was initially referred from Dr. Lee Beck MD.     On today's questioning:  He denies cough or expectoration.  He denies hemoptysis.  He denies fever or chills.   He denies recent hospitalizations or emergency room visits.     He is not using any inhalers.     He denies recent loss of weight or appetite changes.   He denies recent decline in functional status.    He underwent chest CT scan on: Performed on 2022  The above chest  CT was performed at: [facility].    He was ever diagnosed with COVID19 infection/Pneumonia: He had a COVID-19 infection and double pneumonia in .  He was hospitalized to [facility] for a total of 8 days.  He recovered well and was discharged.  He was ever diagnosed with Pneumonia:Yes-20    He ever diagnosed with connective tissue diseases including Systemic lupus Erythematosus, Rheumatoid arthritis etc:NO  Any of his first-degree family relative ever diagnosed with Lung cancer:NO  He ever exposed to radon, or uranium in the past: NO    He is currently using any oxygen supplementation at rest, exercise or during sleep/at night time:NO    He ever had a Colonoscopy performed: Negative colonoscopy 2 years back.  He ever had his prostate check exam performed: Never diagnosed with prostate cancer.        Social History:  Occupation:  He is current working: No  Type of profession: retired.  He used to work in [employer] in [facility].  He used to work as a new car model coordinator in

## 2024-02-29 ENCOUNTER — HOSPITAL ENCOUNTER (OUTPATIENT)
Dept: PULMONOLOGY | Age: 74
Discharge: HOME | End: 2024-02-29
Attending: INTERNAL MEDICINE
Payer: MEDICARE

## 2024-02-29 ENCOUNTER — HOSPITAL ENCOUNTER (OUTPATIENT)
Dept: CT IMAGING | Age: 74
Discharge: HOME | End: 2024-02-29
Attending: INTERNAL MEDICINE
Payer: MEDICARE

## 2024-02-29 DIAGNOSIS — U07.1 PNEUMONIA DUE TO 2019-NCOV: ICD-10-CM

## 2024-02-29 DIAGNOSIS — J12.82 PNEUMONIA DUE TO 2019-NCOV: ICD-10-CM

## 2024-02-29 DIAGNOSIS — J84.9 ILD (INTERSTITIAL LUNG DISEASE) (HCC): ICD-10-CM

## 2024-02-29 DIAGNOSIS — R93.89 ABNORMAL CT OF THE CHEST: ICD-10-CM

## 2024-02-29 DIAGNOSIS — R91.8 MULTIPLE LUNG NODULES: ICD-10-CM

## 2024-02-29 PROCEDURE — 94060 EVALUATION OF WHEEZING: CPT

## 2024-02-29 PROCEDURE — 71250 CT THORAX DX C-: CPT

## 2024-02-29 PROCEDURE — 94726 PLETHYSMOGRAPHY LUNG VOLUMES: CPT

## 2024-02-29 PROCEDURE — 94729 DIFFUSING CAPACITY: CPT

## 2024-03-05 ENCOUNTER — OFFICE VISIT (OUTPATIENT)
Dept: PULMONOLOGY | Age: 74
End: 2024-03-05
Payer: MEDICARE

## 2024-03-05 VITALS
OXYGEN SATURATION: 98 % | HEART RATE: 63 BPM | DIASTOLIC BLOOD PRESSURE: 76 MMHG | SYSTOLIC BLOOD PRESSURE: 124 MMHG | HEIGHT: 71 IN | TEMPERATURE: 97.7 F | BODY MASS INDEX: 27.05 KG/M2 | WEIGHT: 193.2 LBS

## 2024-03-05 DIAGNOSIS — R91.1 INCIDENTAL LUNG NODULE, > 3MM AND < 8MM: ICD-10-CM

## 2024-03-05 DIAGNOSIS — R91.8 MULTIPLE LUNG NODULES: ICD-10-CM

## 2024-03-05 DIAGNOSIS — J12.82 PNEUMONIA DUE TO 2019-NCOV: ICD-10-CM

## 2024-03-05 DIAGNOSIS — U07.1 PNEUMONIA DUE TO 2019-NCOV: ICD-10-CM

## 2024-03-05 DIAGNOSIS — R93.89 ABNORMAL CT OF THE CHEST: ICD-10-CM

## 2024-03-05 DIAGNOSIS — J84.9 ILD (INTERSTITIAL LUNG DISEASE) (HCC): Primary | ICD-10-CM

## 2024-03-05 PROCEDURE — 99214 OFFICE O/P EST MOD 30 MIN: CPT | Performed by: INTERNAL MEDICINE

## 2024-03-05 PROCEDURE — 3017F COLORECTAL CA SCREEN DOC REV: CPT | Performed by: INTERNAL MEDICINE

## 2024-03-05 PROCEDURE — G8484 FLU IMMUNIZE NO ADMIN: HCPCS | Performed by: INTERNAL MEDICINE

## 2024-03-05 PROCEDURE — 1123F ACP DISCUSS/DSCN MKR DOCD: CPT | Performed by: INTERNAL MEDICINE

## 2024-03-05 PROCEDURE — G8417 CALC BMI ABV UP PARAM F/U: HCPCS | Performed by: INTERNAL MEDICINE

## 2024-03-05 PROCEDURE — 1036F TOBACCO NON-USER: CPT | Performed by: INTERNAL MEDICINE

## 2024-03-05 PROCEDURE — G8427 DOCREV CUR MEDS BY ELIG CLIN: HCPCS | Performed by: INTERNAL MEDICINE

## 2024-03-05 NOTE — PATIENT INSTRUCTIONS
Recommendations/Plan:  - Schedule patient for High resolution CT scan of chest without IV contrast in 12months to evaluate for ?  Development of early interstitial lung disease due to his history of COVID-19 pneumonia in the past 2020.  -He was requested to have a hematology consultation by Dr. Tova Pelletier MD from [facility] hematology service at [facility] for further evaluation and management of Tiny lucencies seen within the thoracic vertebrae and sternum-?  Etiology.  However, patient refused to have a hematology consultation.  He want to discuss his abnormal CT scan findings I.e Tiny lucencies are seen within the thoracic vertebrae and sternum with his family physician Dr. Lee Beck MD and proceed from there.  He was advised to call my office if you choose to go for hematology consultation.  He verbalized understanding of my discussion with him.  -He was advised to continue anti-reflux measures such as raising the head of the bed, avoiding tight clothing or belts, avoiding eating late at night and not lying down shortly after mealtime and achieving weight loss are discussed. Avoid ASA, NSAID's, caffeine, peppermints, alcohol and tobacco.   -Schedule patient for full pulmonary function tests before clinic visit in 1 year to be done 1 week before his clinic visit.  -He was advised to continue his Prilosec 20 mg p.o. daily for his a GERD.  -Yong Castaneda was advised to make early appointment with my clinic if he develops any constitutional symptoms including loss of weight, poor appetite or hemoptysis. He verbalizes under standing.  -Schedule patient for follow up with my clinic in 12months with recommended tests I.e HRCT scan of chest without IV contrast and full PFTs. Patient advised to make early appointment if needed.  - Patient educated about my impression and plan.  He verbalizes understanding.

## 2024-03-13 ENCOUNTER — HOSPITAL ENCOUNTER (OUTPATIENT)
Age: 74
Discharge: HOME | End: 2024-03-13
Payer: MEDICARE

## 2024-03-13 ENCOUNTER — HOSPITAL ENCOUNTER (OUTPATIENT)
Dept: GENERAL RADIOLOGY | Age: 74
Discharge: HOME | End: 2024-03-13
Attending: INTERNAL MEDICINE
Payer: MEDICARE

## 2024-03-13 DIAGNOSIS — D47.2 MONOCLONAL GAMMOPATHY: ICD-10-CM

## 2024-03-13 LAB
ALBUMIN SERPL BCP-MCNC: 4.1 GM/DL (ref 3.4–5)
ALP SERPL-CCNC: 84 U/L (ref 46–116)
ALT SERPL W P-5'-P-CCNC: 32 U/L (ref 14–63)
ANION GAP SERPL CALC-SCNC: 8 MEQ/L (ref 8–16)
AST SERPL W P-5'-P-CCNC: 22 U/L (ref 15–37)
BASOPHILS # BLD: 0.5 % (ref 0–3)
BASOPHILS ABSOLUTE: 0 THOU/MM3 (ref 0–0.1)
BILIRUB SERPL-MCNC: 0.5 MG/DL (ref 0.2–1)
BUN SERPL-MCNC: 20 MG/DL (ref 7–18)
CALCIUM SERPL-MCNC: 9.1 MG/DL (ref 8.5–10.1)
CHLORIDE SERPL-SCNC: 105 MEQ/L (ref 98–107)
CO2 SERPL-SCNC: 31 MEQ/L (ref 21–32)
CREAT SERPL-MCNC: 1.2 MG/DL (ref 0.6–1.3)
EOSINOPHILS ABSOLUTE: 0.1 THOU/MM3 (ref 0–0.5)
EOSINOPHILS RELATIVE PERCENT: 1.5 % (ref 0–4)
ERYTHROCYTE [SEDIMENTATION RATE] IN BLOOD BY WESTERGREN METHOD: 45 MM/HR (ref 0–10)
GFR SERPL CREATININE-BSD FRML MDRD: > 60 ML/MIN/1.73M2
GLUCOSE SERPL-MCNC: 98 MG/DL (ref 74–106)
HCT VFR BLD CALC: 44.5 % (ref 42–52)
HEMOGLOBIN: 14.7 GM/DL (ref 14–18)
IMMATURE GRANS (ABS): 0 THOU/MM3 (ref 0–0.07)
IMMATURE GRANULOCYTES: 0 %
LYMPHOCYTES # BLD AUTO: 17 % (ref 15–47)
LYMPHOCYTES ABSOLUTE: 1.4 THOU/MM3 (ref 1–4.8)
MCH RBC QN AUTO: 31.3 PG (ref 26–32)
MCHC RBC AUTO-ENTMCNC: 33 GM/DL (ref 31–35)
MCV RBC AUTO: 94.9 FL (ref 80–94)
MONOCYTES: 0.7 THOU/MM3 (ref 0.3–1.3)
MONOCYTES: 8.4 % (ref 0–12)
PDW BLD-RTO: 12.4 % (ref 11.5–14.9)
PLATELET # BLD AUTO: 272 THOU/MM3 (ref 130–400)
PMV BLD AUTO: 8.9 FL (ref 9.4–12.4)
POTASSIUM SERPL-SCNC: 3.6 MEQ/L (ref 3.5–5.1)
PROT SERPL-MCNC: 7.6 GM/DL (ref 6.4–8.2)
RBC # BLD: 4.69 MILL/MM3 (ref 4.5–6.1)
SEG NEUTROPHILS: 72.4 % (ref 43–75)
SEGMENTED NEUTROPHILS ABSOLUTE COUNT: 6.1 THOU/MM3 (ref 1.8–7.7)
SODIUM SERPL-SCNC: 144 MEQ/L (ref 136–145)
WBC # BLD: 8.4 THOU/MM3 (ref 4.8–10.8)

## 2024-03-13 PROCEDURE — 84156 ASSAY OF PROTEIN URINE: CPT

## 2024-03-13 PROCEDURE — 77075 RADEX OSSEOUS SURVEY COMPL: CPT

## 2024-03-13 PROCEDURE — 85651 RBC SED RATE NONAUTOMATED: CPT

## 2024-03-13 PROCEDURE — 83521 IG LIGHT CHAINS FREE EACH: CPT

## 2024-03-13 PROCEDURE — 82232 ASSAY OF BETA-2 PROTEIN: CPT

## 2024-03-13 PROCEDURE — 84165 PROTEIN E-PHORESIS SERUM: CPT

## 2024-03-13 PROCEDURE — 82784 ASSAY IGA/IGD/IGG/IGM EACH: CPT

## 2024-03-13 PROCEDURE — 85025 COMPLETE CBC W/AUTO DIFF WBC: CPT

## 2024-03-13 PROCEDURE — 84166 PROTEIN E-PHORESIS/URINE/CSF: CPT

## 2024-03-13 PROCEDURE — 86335 IMMUNFIX E-PHORSIS/URINE/CSF: CPT

## 2024-03-13 PROCEDURE — 84155 ASSAY OF PROTEIN SERUM: CPT

## 2024-03-13 PROCEDURE — 84550 ASSAY OF BLOOD/URIC ACID: CPT

## 2024-03-13 PROCEDURE — 80053 COMPREHEN METABOLIC PANEL: CPT

## 2024-03-13 PROCEDURE — 36415 COLL VENOUS BLD VENIPUNCTURE: CPT

## 2024-03-13 PROCEDURE — 83615 LACTATE (LD) (LDH) ENZYME: CPT

## 2024-03-14 LAB
B2 MICROGLOB SERPL-MCNC: 1.6 MG/L
IGA SERPL-MCNC: 234 MG/DL (ref 70–400)
IGG SERPL-MCNC: 886 MG/DL (ref 700–1600)
IGM SERPL-MCNC: 79 MG/DL (ref 40–230)
LDH SERPL L TO P-CCNC: 152 U/L (ref 100–190)
URATE SERPL-MCNC: 5.4 MG/DL (ref 3.7–7)

## 2024-03-16 LAB — KAPPA/LAMBDA FREE LIGHT CHAINS: NORMAL

## 2024-03-18 LAB — PROTEIN ELECTROPHORESIS, SERUM: NORMAL

## 2024-03-20 LAB — PROTEIN ELECTROPHORESIS, URINE: NORMAL

## 2024-05-21 ENCOUNTER — HOSPITAL ENCOUNTER (OUTPATIENT)
Dept: MRI IMAGING | Age: 74
Discharge: HOME | End: 2024-05-21
Attending: FAMILY MEDICINE
Payer: MEDICARE

## 2024-05-21 DIAGNOSIS — M25.562 ACUTE PAIN OF LEFT KNEE: ICD-10-CM

## 2024-05-21 PROCEDURE — 73721 MRI JNT OF LWR EXTRE W/O DYE: CPT

## 2024-10-11 ENCOUNTER — HOSPITAL ENCOUNTER (OUTPATIENT)
Age: 74
Discharge: HOME | End: 2024-10-11
Payer: MEDICARE

## 2024-10-11 DIAGNOSIS — Z51.81 MEDICATION MONITORING ENCOUNTER: ICD-10-CM

## 2024-10-11 DIAGNOSIS — E78.5 HYPERLIPIDEMIA, UNSPECIFIED HYPERLIPIDEMIA TYPE: ICD-10-CM

## 2024-10-11 DIAGNOSIS — Z12.5 PROSTATE CANCER SCREENING: ICD-10-CM

## 2024-10-11 DIAGNOSIS — Z12.5 SCREENING PSA (PROSTATE SPECIFIC ANTIGEN): ICD-10-CM

## 2024-10-11 LAB
ALBUMIN SERPL BCG-MCNC: 4.3 G/DL (ref 3.5–5.1)
ALP SERPL-CCNC: 81 U/L (ref 38–126)
ALT SERPL W/O P-5'-P-CCNC: 19 U/L (ref 11–66)
ANION GAP SERPL CALC-SCNC: 11 MEQ/L (ref 8–16)
AST SERPL-CCNC: 22 U/L (ref 5–40)
BASOPHILS ABSOLUTE: 0.1 THOU/MM3 (ref 0–0.1)
BASOPHILS NFR BLD AUTO: 0.8 %
BILIRUB CONJ SERPL-MCNC: 0.2 MG/DL (ref 0.1–13.8)
BILIRUB SERPL-MCNC: 0.7 MG/DL (ref 0.3–1.2)
BUN SERPL-MCNC: 13 MG/DL (ref 7–22)
CALCIUM SERPL-MCNC: 9.3 MG/DL (ref 8.5–10.5)
CHLORIDE SERPL-SCNC: 106 MEQ/L (ref 98–111)
CHOLEST SERPL-MCNC: 160 MG/DL (ref 100–199)
CO2 SERPL-SCNC: 28 MEQ/L (ref 23–33)
CREAT SERPL-MCNC: 0.9 MG/DL (ref 0.4–1.2)
DEPRECATED RDW RBC AUTO: 46.2 FL (ref 35–45)
EOSINOPHIL NFR BLD AUTO: 3.4 %
EOSINOPHILS ABSOLUTE: 0.2 THOU/MM3 (ref 0–0.4)
ERYTHROCYTE [DISTWIDTH] IN BLOOD BY AUTOMATED COUNT: 13.1 % (ref 11.5–14.5)
GFR SERPL CREATININE-BSD FRML MDRD: 89 ML/MIN/1.73M2
GLUCOSE SERPL-MCNC: 103 MG/DL (ref 70–108)
HCT VFR BLD AUTO: 44.3 % (ref 42–52)
HDLC SERPL-MCNC: 45 MG/DL
HGB BLD-MCNC: 14.2 GM/DL (ref 14–18)
IMM GRANULOCYTES # BLD AUTO: 0.02 THOU/MM3 (ref 0–0.07)
IMM GRANULOCYTES NFR BLD AUTO: 0.3 %
LDLC SERPL CALC-MCNC: 98 MG/DL
LYMPHOCYTES ABSOLUTE: 1.4 THOU/MM3 (ref 1–4.8)
LYMPHOCYTES NFR BLD AUTO: 21.5 %
MCH RBC QN AUTO: 31.3 PG (ref 26–33)
MCHC RBC AUTO-ENTMCNC: 32.1 GM/DL (ref 32.2–35.5)
MCV RBC AUTO: 97.8 FL (ref 80–94)
MONOCYTES ABSOLUTE: 0.7 THOU/MM3 (ref 0.4–1.3)
MONOCYTES NFR BLD AUTO: 10.3 %
NEUTROPHILS ABSOLUTE: 4.1 THOU/MM3 (ref 1.8–7.7)
NEUTROPHILS NFR BLD AUTO: 63.7 %
NRBC BLD AUTO-RTO: 0 /100 WBC
PLATELET # BLD AUTO: 282 THOU/MM3 (ref 130–400)
PMV BLD AUTO: 9.5 FL (ref 9.4–12.4)
POTASSIUM SERPL-SCNC: 4.3 MEQ/L (ref 3.5–5.2)
PROT SERPL-MCNC: 7 G/DL (ref 6.1–8)
PSA SERPL-MCNC: 0.63 NG/ML (ref 0–1)
RBC # BLD AUTO: 4.53 MILL/MM3 (ref 4.7–6.1)
SODIUM SERPL-SCNC: 145 MEQ/L (ref 135–145)
TRIGL SERPL-MCNC: 84 MG/DL (ref 0–199)
WBC # BLD AUTO: 6.4 THOU/MM3 (ref 4.8–10.8)

## 2024-10-11 PROCEDURE — 80053 COMPREHEN METABOLIC PANEL: CPT

## 2024-10-11 PROCEDURE — 36415 COLL VENOUS BLD VENIPUNCTURE: CPT

## 2024-10-11 PROCEDURE — 84153 ASSAY OF PSA TOTAL: CPT

## 2024-10-11 PROCEDURE — 80061 LIPID PANEL: CPT

## 2024-10-11 PROCEDURE — 85025 COMPLETE CBC W/AUTO DIFF WBC: CPT

## 2024-10-11 PROCEDURE — 82248 BILIRUBIN DIRECT: CPT

## 2025-02-20 ENCOUNTER — HOSPITAL ENCOUNTER (OUTPATIENT)
Dept: CT IMAGING | Age: 75
Discharge: HOME | End: 2025-02-20
Attending: INTERNAL MEDICINE
Payer: MEDICARE

## 2025-02-20 ENCOUNTER — HOSPITAL ENCOUNTER (OUTPATIENT)
Dept: PULMONOLOGY | Age: 75
Discharge: HOME | End: 2025-02-20
Attending: INTERNAL MEDICINE
Payer: MEDICARE

## 2025-02-20 DIAGNOSIS — R93.89 ABNORMAL CT OF THE CHEST: ICD-10-CM

## 2025-02-20 DIAGNOSIS — J12.82 PNEUMONIA DUE TO 2019-NCOV: ICD-10-CM

## 2025-02-20 DIAGNOSIS — U07.1 PNEUMONIA DUE TO 2019-NCOV: ICD-10-CM

## 2025-02-20 DIAGNOSIS — R91.1 INCIDENTAL LUNG NODULE, > 3MM AND < 8MM: ICD-10-CM

## 2025-02-20 DIAGNOSIS — R91.8 MULTIPLE LUNG NODULES: ICD-10-CM

## 2025-02-20 DIAGNOSIS — J84.9 ILD (INTERSTITIAL LUNG DISEASE) (HCC): ICD-10-CM

## 2025-02-20 PROCEDURE — 94726 PLETHYSMOGRAPHY LUNG VOLUMES: CPT

## 2025-02-20 PROCEDURE — 94060 EVALUATION OF WHEEZING: CPT

## 2025-02-20 PROCEDURE — G1010 CDSM STANSON: HCPCS

## 2025-02-20 PROCEDURE — 94729 DIFFUSING CAPACITY: CPT

## 2025-02-24 ENCOUNTER — OFFICE VISIT (OUTPATIENT)
Dept: PULMONOLOGY | Age: 75
End: 2025-02-24
Payer: MEDICARE

## 2025-02-24 VITALS
BODY MASS INDEX: 27.02 KG/M2 | HEIGHT: 71 IN | TEMPERATURE: 98 F | HEART RATE: 56 BPM | DIASTOLIC BLOOD PRESSURE: 80 MMHG | WEIGHT: 193 LBS | SYSTOLIC BLOOD PRESSURE: 120 MMHG | OXYGEN SATURATION: 99 %

## 2025-02-24 DIAGNOSIS — R91.1 INCIDENTAL LUNG NODULE, > 3MM AND < 8MM: ICD-10-CM

## 2025-02-24 DIAGNOSIS — R91.8 MULTIPLE LUNG NODULES: ICD-10-CM

## 2025-02-24 DIAGNOSIS — J12.82 PNEUMONIA DUE TO 2019-NCOV: ICD-10-CM

## 2025-02-24 DIAGNOSIS — J84.9 ILD (INTERSTITIAL LUNG DISEASE) (HCC): Primary | ICD-10-CM

## 2025-02-24 DIAGNOSIS — U07.1 PNEUMONIA DUE TO 2019-NCOV: ICD-10-CM

## 2025-02-24 PROCEDURE — G8427 DOCREV CUR MEDS BY ELIG CLIN: HCPCS | Performed by: INTERNAL MEDICINE

## 2025-02-24 PROCEDURE — G8417 CALC BMI ABV UP PARAM F/U: HCPCS | Performed by: INTERNAL MEDICINE

## 2025-02-24 PROCEDURE — 3017F COLORECTAL CA SCREEN DOC REV: CPT | Performed by: INTERNAL MEDICINE

## 2025-02-24 PROCEDURE — 1123F ACP DISCUSS/DSCN MKR DOCD: CPT | Performed by: INTERNAL MEDICINE

## 2025-02-24 PROCEDURE — 99214 OFFICE O/P EST MOD 30 MIN: CPT | Performed by: INTERNAL MEDICINE

## 2025-02-24 PROCEDURE — 1159F MED LIST DOCD IN RCRD: CPT | Performed by: INTERNAL MEDICINE

## 2025-02-24 PROCEDURE — 1036F TOBACCO NON-USER: CPT | Performed by: INTERNAL MEDICINE

## 2025-02-24 NOTE — PROGRESS NOTES
[facility]  Pulmonary medicine clinic follow up note.      Patient: Yong Castaneda  : 1950      Chief complaint/[place]:     Yong Castaneda is a 74 y.o. old male came for follow up regarding his lung nodule/s measuring subcentimeter lung nodules noted on his CT scan of chest dated 2022 measuring 3 mm and 4 mm after having HRCT scan of chest with out IV contrast and full PFTs before clinic visit    He was initially referred from Dr. Lee Beck MD.     On today's questioning:  He denies cough or expectoration.  He denies hemoptysis.  He denies fever or chills.   He denies recent hospitalizations or emergency room visits.     He is not using any inhalers.     He denies recent loss of weight or appetite changes.   He denies recent decline in functional status.    He underwent chest CT scan on: Performed on 2022  The above chest  CT was performed at: [facility].    He was ever diagnosed with COVID19 infection/Pneumonia: He had a COVID-19 infection and double pneumonia in .  He was hospitalized to [facility] for a total of 8 days.  He recovered well and was discharged.  He was ever diagnosed with Pneumonia:Yes-20    He ever diagnosed with connective tissue diseases including Systemic lupus Erythematosus, Rheumatoid arthritis etc:NO  Any of his first-degree family relative ever diagnosed with Lung cancer:NO  He ever exposed to radon, or uranium in the past: NO    He is currently using any oxygen supplementation at rest, exercise or during sleep/at night time:NO    He ever had a Colonoscopy performed: Negative colonoscopy 2 years back.  He ever had his prostate check exam performed: Never diagnosed with prostate cancer.        Social History:  Occupation:  He is current working: No  Type of profession: retired.  He used to work in [employer] in [facility] [place].  He used to work as a new car model coordinator in 
Neck Circumference -   15 inches  Mallampati - 2    Lung Nodule Screening     [] Qualifies    [x] Does not qualify   [] Declined    [] Completed    
it was recommended patient to have hematology consultation with Dr. Tova Pelletier MD for further evaluation and management of tiny lucencies seen within the thoracic vertebrae and sternum with concern for possible multiple myeloma seen on CT scan 2/29/2024.  No reported tiny lucency seen on CT scan 2/20/2025.  At that time patient wished to proceed with further discussion with his PCP Dr. Beck.  Recommend patient continue to follow-up with his PCP, patient has high-res CT scan ordered for 1 year.  - Patient scheduled for follow-up in pulmonology clinic in 12 months with the recommended tests of HRCT without IV contrast as well as full PFTs ordered.  Patient is advised to make early appointment if needed.  Patient has been educated about impression and plan.  -Patient has been educated about the plan and understands.  If patient has new onset of symptoms, worsening symptoms can call office to schedule sooner follow-up.    Case was discussed and staffed with attending, Dr. Nataliya MD  Electronically signed by Romel Bonilla DO PGY-3 on 2/24/2025 at 11:23 AM    Addendum by Dr. Nataliya MD:  Patient seen by me independently including key components of medical care. Face to face evaluation and examination was performed. Case discussed with Dr. Romel Bonilla DO, Agree with resident's findings and plan as documented in the resident's note. Italicized font, if present,  represents changes to the note made by me.   More than 50% of the encounter time involved with patient care by the Pulmonary & Critical care service team spent by me.    Please see my modifications mentioned below:  -Rest of the management plan as above.  -Yong Castaneda was advised to make early appointment with my clinic if he develops any constitutional symptoms including loss of weight, poor appetite or hemoptysis. He verbalizes under standing.  -Yong Castaneda educated about my impression and plan. He verbalizes understanding.  -I

## 2025-04-14 ENCOUNTER — APPOINTMENT (OUTPATIENT)
Dept: GENERAL RADIOLOGY | Age: 75
End: 2025-04-14
Payer: MEDICARE

## 2025-04-14 ENCOUNTER — HOSPITAL ENCOUNTER (EMERGENCY)
Age: 75
Discharge: HOME | End: 2025-04-14
Attending: EMERGENCY MEDICINE
Payer: MEDICARE

## 2025-04-14 VITALS
RESPIRATION RATE: 16 BRPM | HEIGHT: 70 IN | BODY MASS INDEX: 27.2 KG/M2 | TEMPERATURE: 97.4 F | HEART RATE: 67 BPM | WEIGHT: 190 LBS | OXYGEN SATURATION: 97 %

## 2025-04-14 DIAGNOSIS — J20.9 ACUTE BRONCHITIS, UNSPECIFIED ORGANISM: Primary | ICD-10-CM

## 2025-04-14 LAB
INFLUENZA A BY PCR: NOT DETECTED
INFLUENZA B BY PCR: NOT DETECTED
SARS-COV-2 RNA, RT PCR: NOT DETECTED

## 2025-04-14 PROCEDURE — 87636 SARSCOV2 & INF A&B AMP PRB: CPT

## 2025-04-14 PROCEDURE — 99284 EMERGENCY DEPT VISIT MOD MDM: CPT

## 2025-04-14 PROCEDURE — 71046 X-RAY EXAM CHEST 2 VIEWS: CPT

## 2025-04-14 RX ORDER — DOXYCYCLINE HYCLATE 100 MG
100 TABLET ORAL 2 TIMES DAILY
Qty: 20 TABLET | Refills: 0 | Status: SHIPPED | OUTPATIENT
Start: 2025-04-14 | End: 2025-04-24

## 2025-04-14 RX ORDER — BENZONATATE 200 MG/1
200 CAPSULE ORAL 3 TIMES DAILY PRN
Qty: 30 CAPSULE | Refills: 0 | Status: SHIPPED | OUTPATIENT
Start: 2025-04-14 | End: 2025-04-24

## 2025-04-14 ASSESSMENT — PAIN - FUNCTIONAL ASSESSMENT
PAIN_FUNCTIONAL_ASSESSMENT: NONE - DENIES PAIN
PAIN_FUNCTIONAL_ASSESSMENT: NONE - DENIES PAIN

## 2025-04-14 ASSESSMENT — ENCOUNTER SYMPTOMS
ABDOMINAL PAIN: 0
WHEEZING: 0
SORE THROAT: 0
COUGH: 1
SHORTNESS OF BREATH: 0
NAUSEA: 0

## 2025-04-14 NOTE — DISCHARGE INSTRUCTIONS
Rest, plenty of fluids to drink.   Medications as prescribed.   Return to ED for worsening symptoms.

## 2025-04-14 NOTE — ED PROVIDER NOTES
Lee LEE MD  [address] [place] [id number]  [phone]      As needed      DISCHARGE MEDICATIONS:    New Prescriptions    BENZONATATE (TESSALON) 200 MG CAPSULE    Take 1 capsule by mouth 3 times daily as needed for Cough    DOXYCYCLINE HYCLATE (VIBRA-TABS) 100 MG TABLET    Take 1 tablet by mouth 2 times daily for 10 days          (Please note that portions of this note were completed with a voice recognition program.  Efforts were made to edit the dictations but occasionally words are mis-transcribed.)       Bainbridge, Ruth S, MD  04/14/25 [id number]

## 2025-07-14 ENCOUNTER — HOSPITAL ENCOUNTER (OUTPATIENT)
Dept: AUDIOLOGY | Age: 75
Discharge: HOME | End: 2025-07-14
Payer: MEDICARE

## 2025-07-14 PROCEDURE — 92557 COMPREHENSIVE HEARING TEST: CPT | Performed by: AUDIOLOGIST

## 2025-07-14 PROCEDURE — 92567 TYMPANOMETRY: CPT | Performed by: AUDIOLOGIST

## 2025-07-14 NOTE — PROGRESS NOTES
recognition ability is excellent at 100% for the right ear and good at 96% for the left ear. Tympanometry revealed normal ear canal volume, normal peak pressure and normal middle ear compliance for the right ear and a flat tympanogram with reduced ear canal volume consistent with middle ear dysfunction for the left ear.      RECOMMENDATION(S):   1- Complete follow up with Dr. Beck as planned.  2- Repeat testing following any medical management.      PREVIOUS AUDIOGRAM: